# Patient Record
Sex: FEMALE | Race: WHITE | Employment: OTHER | URBAN - METROPOLITAN AREA
[De-identification: names, ages, dates, MRNs, and addresses within clinical notes are randomized per-mention and may not be internally consistent; named-entity substitution may affect disease eponyms.]

---

## 2017-02-06 ENCOUNTER — OFFICE VISIT (OUTPATIENT)
Dept: SURGERY | Age: 62
End: 2017-02-06

## 2017-02-06 VITALS
HEART RATE: 85 BPM | HEIGHT: 61 IN | DIASTOLIC BLOOD PRESSURE: 67 MMHG | WEIGHT: 204 LBS | SYSTOLIC BLOOD PRESSURE: 106 MMHG | BODY MASS INDEX: 38.51 KG/M2

## 2017-02-06 DIAGNOSIS — Z90.11 S/P RIGHT MASTECTOMY: ICD-10-CM

## 2017-02-06 DIAGNOSIS — C50.411 CARCINOMA OF UPPER-OUTER QUADRANT OF FEMALE BREAST, RIGHT (HCC): Primary | ICD-10-CM

## 2017-02-06 RX ORDER — VALSARTAN AND HYDROCHLOROTHIAZIDE 80; 12.5 MG/1; MG/1
TABLET, FILM COATED ORAL
Refills: 12 | COMMUNITY
Start: 2017-01-30 | End: 2018-01-12 | Stop reason: SDUPTHER

## 2017-02-06 RX ORDER — SERTRALINE HYDROCHLORIDE 50 MG/1
TABLET, FILM COATED ORAL
Refills: 3 | COMMUNITY
Start: 2017-01-30 | End: 2017-08-30 | Stop reason: SDUPTHER

## 2017-02-06 RX ORDER — METFORMIN HYDROCHLORIDE 500 MG/1
TABLET ORAL
Refills: 3 | COMMUNITY
Start: 2017-01-30 | End: 2017-08-30 | Stop reason: SDUPTHER

## 2017-02-06 NOTE — PROGRESS NOTES
HISTORY OF PRESENT ILLNESS  Nahed Rodriguez is a 57 y.o. female. HPI  ESTABLISHED patient here today for follow up RIGHT breast cancer. Denies any breast problems at this time. She wears her sleeve for right arm lymphedema. She is currently taking Letrozole and tolerating well with hot flashes and mood swings. She was started on Zoloft to help with this, which has helped.      History of breast cancer-  RIGHT breast IDC - ER/[place] positive, Her2 negative. Low risk mammaprint. T2N1mi  12/3/15- RIGHT breast mastectomy, SLNB and no reconstruction  1/19/16- RIGHT breast debridement of mastectomy site  4/2016- completed radiation. Followed by Dr. Almas Arthur.    Started Letrozole. Followed by Dr. Murtaza Matamoros. OB History     Obstetric Comments    Menarche:  14. LMP: 38.  # of Children:  2. Age at Delivery of First Child:  20.   Hysterectomy/oophorectomy:  NO/NO. Breast Bx:  no.  Hx of Breast Feeding:  no. BCP:  yes. Hormone therapy:  no.               Past Surgical History   Procedure Laterality Date    Hx heent  1968     tonsillectomy    Hx appendectomy  1970    Hx mastectomy Right 12/3/2015     RIGHT BREAST SIMPLE MASTECTOMY WITH RIGHT SENTINEL NODE BIOPSY performed by Louisa Becker MD at [facility] AMBULATORY OR    Hx breast biopsy Right 1/19/2016     RIGHT BREAST DEBRIDEMENT OF MASTECTOMY SITE performed by Louisa Becker MD at [address] includes-  Paternal aunt diagnosed with breast cancer in late 60's-early 70's. Recent imaging-  Left screening mammogram on 10/25/16- BIRADS 2  FINDINGS: Unilateral digital diagnostic left mammography was performed, and is  interpreted in conjunction with a computer assisted detection (CAD) system. The  breast parenchymal pattern is scattered fibroglandular densities. No new masses  or suspicious calcifications are identified. There is no skin thickening  or nipple retraction. IMPRESSION:   No mammographic evidence of malignancy.  Next screening mammogram is  recommended in one year. The patient will be notified of these results. BIRADS 2: Benign. ROS    Physical Exam   Constitutional: She is oriented to person, place, and time. She appears well-developed and well-nourished. Pulmonary/Chest: Right breast exhibits no mass, no skin change and no tenderness. Left breast exhibits no inverted nipple, no mass, no nipple discharge, no skin change and no tenderness. Musculoskeletal: Normal range of motion. UE x 2  Wearing right arm compression sleeve   Lymphadenopathy:     She has no cervical adenopathy. She has no axillary adenopathy. Right: No supraclavicular adenopathy present. Left: No supraclavicular adenopathy present. Neurological: She is alert and oriented to person, place, and time. Skin: Skin is warm, dry and intact. Chest and breasts examined   Psychiatric: She has a normal mood and affect. Her speech is normal and behavior is normal.     Visit Vitals    /67    Pulse 85    Ht 5' 1\" (1.549 m)    Wt 204 lb (92.5 kg)    BMI 38.55 kg/m2     ASSESSMENT and PLAN  Encounter Diagnoses   Name Primary?  Carcinoma of upper-outer quadrant of female breast, right ([address] Yes    S/P right mastectomy      Normal exam and imaging with no evidence of local disease. We discussed her diagnosis, treatment and ongoing management of her breast health with SBE, CBE and imaging. She is tolerating the letrozole well with the addition of Zoloft. She was given a prescription for a right breast prosthesis and bras. She will plan to RTC here in 6 months and then I anticipate yearly visits. She will also continue to see Dr. Fang Gomes. She will plan to have a LSmammogram in 10/2017. She is comfortable with this plan. All questions answered and she stated understanding.

## 2017-02-06 NOTE — PROGRESS NOTES
HISTORY OF PRESENT ILLNESS  Denis Hagan is a 57 y.o. female. HPI   ESTABLISHED patient here today for follow up RIGHT breast cancer. Denies any breast problems at this time. She wears her sleeve for right arm lymphedema. She is currently taking Letrozole and tolerating well with hot flashes and mood swings. She was started on Zoloft to help with this, which has helped.      History of breast cancer-  RIGHT breast IDC - ER/[place] positive, Her2 negative. Low risk mammaprint. T2N1mi  12/3/15- RIGHT breast mastectomy, SLNB and no reconstruction  1/19/16- RIGHT breast debridement of mastectomy site  4/2016- completed radiation. Followed by Dr. Sindy Mckeon.    Started Letrozole. Followed by Dr. Kalie Berkowitz. FH includes-  Paternal aunt diagnosed with breast cancer in late 60's-early 66's. Recent imaging-  Left screening mammogram on 10/25/16- BIRADS 2  FINDINGS: Unilateral digital diagnostic left mammography was performed, and is  interpreted in conjunction with a computer assisted detection (CAD) system. The  breast parenchymal pattern is scattered fibroglandular densities. No new masses  or suspicious calcifications are identified. There is no skin thickening  or nipple retraction. IMPRESSION:   No mammographic evidence of malignancy. Next screening mammogram is  recommended in one year. The patient will be notified of these results.      BIRADS 2: Benign.      ROS    Physical Exam    ASSESSMENT and PLAN  {ASSESSMENT/PLAN:43084}

## 2017-02-06 NOTE — PATIENT INSTRUCTIONS
Breast Self-Exam: Care Instructions  Your Care Instructions  A breast self-exam is when you check your breasts for lumps or changes. This regular exam helps you learn how your breasts normally look and feel. Most breast problems or changes are not because of cancer. Breast self-exam is not a substitute for a mammogram. Having regular breast exams by your doctor and regular mammograms improve your chances of finding any problems with your breasts. Some women set a time each month to do a step-by-step breast self-exam. Other women like a less formal system. They might look at their breasts as they brush their teeth, or feel their breasts once in a while in the shower. If you notice a change in your breast, tell your doctor. Follow-up care is a key part of your treatment and safety. Be sure to make and go to all appointments, and call your doctor if you are having problems. Its also a good idea to know your test results and keep a list of the medicines you take. How do you do a breast self-exam?  · The best time to examine your breasts is usually one week after your menstrual period begins. Your breasts should not be tender then. If you do not have periods, you might do your exam on a day of the month that is easy to remember. · To examine your breasts:  ¨ Remove all your clothes above the waist and lie down. When you are lying down, your breast tissue spreads evenly over your chest wall, which makes it easier to feel all your breast tissue. ¨ Use the pads--not the fingertips--of the 3 middle fingers of your left hand to check your right breast. Move your fingers slowly in small coin-sized circles that overlap. ¨ Use three levels of pressure to feel of all your breast tissue. Use light pressure to feel the tissue close to the skin surface. Use medium pressure to feel a little deeper. Use firm pressure to feel your tissue close to your breastbone and ribs.  Use each pressure level to feel your breast tissue before moving on to the next spot. ¨ Check your entire breast, moving up and down as if following a strip from the collarbone to the bra line, and from the armpit to the ribs. Repeat until you have covered the entire breast.  ¨ Repeat this procedure for your left breast, using the pads of the 3 middle fingers of your right hand. · To examine your breasts while in the shower:  ¨ Place one arm over your head and lightly soap your breast on that side. ¨ Using the pads of your fingers, gently move your hand over your breast (in the strip pattern described above), feeling carefully for any lumps or changes. ¨ Repeat for the other breast.  · Have your doctor inspect anything you notice to see if you need further testing. Where can you learn more? Go to http://josé luis-nicole.info/. Enter P148 in the search box to learn more about \"Breast Self-Exam: Care Instructions. \"  Current as of: July 26, 2016  Content Version: 11.1  © 4149-6319 [employer]. Care instructions adapted under license by [employer] (which disclaims liability or warranty for this information). If you have questions about a medical condition or this instruction, always ask your healthcare professional. [place] [address] any warranty or liability for your use of this information.

## 2017-08-07 ENCOUNTER — OFFICE VISIT (OUTPATIENT)
Dept: SURGERY | Age: 62
End: 2017-08-07

## 2017-08-07 VITALS
WEIGHT: 210 LBS | HEART RATE: 88 BPM | BODY MASS INDEX: 39.65 KG/M2 | HEIGHT: 61 IN | SYSTOLIC BLOOD PRESSURE: 125 MMHG | DIASTOLIC BLOOD PRESSURE: 69 MMHG

## 2017-08-07 DIAGNOSIS — Z90.11 S/P RIGHT MASTECTOMY: ICD-10-CM

## 2017-08-07 DIAGNOSIS — C50.411 CARCINOMA OF UPPER-OUTER QUADRANT OF FEMALE BREAST, RIGHT (HCC): Primary | ICD-10-CM

## 2017-08-07 RX ORDER — ERGOCALCIFEROL 1.25 MG/1
CAPSULE ORAL
Refills: 0 | COMMUNITY
Start: 2017-07-20 | End: 2017-10-27 | Stop reason: ALTCHOICE

## 2017-08-07 NOTE — PATIENT INSTRUCTIONS
Breast Self-Exam: Care Instructions  Your Care Instructions  A breast self-exam is when you check your breasts for lumps or changes. This regular exam helps you learn how your breasts normally look and feel. Most breast problems or changes are not because of cancer. Breast self-exam is not a substitute for a mammogram. Having regular breast exams by your doctor and regular mammograms improve your chances of finding any problems with your breasts. Some women set a time each month to do a step-by-step breast self-exam. Other women like a less formal system. They might look at their breasts as they brush their teeth, or feel their breasts once in a while in the shower. If you notice a change in your breast, tell your doctor. Follow-up care is a key part of your treatment and safety. Be sure to make and go to all appointments, and call your doctor if you are having problems. Its also a good idea to know your test results and keep a list of the medicines you take. How do you do a breast self-exam?  · The best time to examine your breasts is usually one week after your menstrual period begins. Your breasts should not be tender then. If you do not have periods, you might do your exam on a day of the month that is easy to remember. · To examine your breasts:  ¨ Remove all your clothes above the waist and lie down. When you are lying down, your breast tissue spreads evenly over your chest wall, which makes it easier to feel all your breast tissue. ¨ Use the pads--not the fingertips--of the 3 middle fingers of your left hand to check your right breast. Move your fingers slowly in small coin-sized circles that overlap. ¨ Use three levels of pressure to feel of all your breast tissue. Use light pressure to feel the tissue close to the skin surface. Use medium pressure to feel a little deeper. Use firm pressure to feel your tissue close to your breastbone and ribs.  Use each pressure level to feel your breast tissue before moving on to the next spot. ¨ Check your entire breast, moving up and down as if following a strip from the collarbone to the bra line, and from the armpit to the ribs. Repeat until you have covered the entire breast.  ¨ Repeat this procedure for your left breast, using the pads of the 3 middle fingers of your right hand. · To examine your breasts while in the shower:  ¨ Place one arm over your head and lightly soap your breast on that side. ¨ Using the pads of your fingers, gently move your hand over your breast (in the strip pattern described above), feeling carefully for any lumps or changes. ¨ Repeat for the other breast.  · Have your doctor inspect anything you notice to see if you need further testing. Where can you learn more? Go to http://josé luis-nicole.info/. Enter P148 in the search box to learn more about \"Breast Self-Exam: Care Instructions. \"  Current as of: July 26, 2016  Content Version: 11.3  © 6201-3569 [employer]. Care instructions adapted under license by [employer] (which disclaims liability or warranty for this information). If you have questions about a medical condition or this instruction, always ask your healthcare professional. [place] [address] any warranty or liability for your use of this information.

## 2017-08-07 NOTE — PROGRESS NOTES
HISTORY OF PRESENT ILLNESS  Christy Green is a 57 y.o. female.   HPI       ROS    Physical Exam    ASSESSMENT and PLAN  {ASSESSMENT/PLAN:12753}

## 2017-08-07 NOTE — PROGRESS NOTES
HISTORY OF PRESENT ILLNESS  Mira Hassan is a 57 y.o. female. HPI  ESTABLISHED patient here today for follow up RIGHT breast cancer. Denies any breast problems at this time. Wears right arm lymphedema sleeve at night. Currently taking Letrozole and tolerating with hot flashes. She takes zoloft and every dose of Vitamin D for this symptom. History of breast cancer-  RIGHT breast IDC - ER/[place] positive, Her2 negative. Low risk mammaprint. T2N1mi  12/3/15- RIGHT breast mastectomy, SLNB and no reconstruction  1/19/16- RIGHT breast debridement of mastectomy site  4/2016- completed radiation. Followed by Dr. Chun Nicole.    Started Letrozole. Followed by Dr. Antonio Ware. OB History     Obstetric Comments    Menarche:  14. LMP: 38.  # of Children:  2. Age at Delivery of First Child:  20.   Hysterectomy/oophorectomy:  NO/NO. Breast Bx:  no.  Hx of Breast Feeding:  no. BCP:  yes. Hormone therapy:  no.               Past Surgical History:   Procedure Laterality Date    HX APPENDECTOMY  1970    HX BREAST BIOPSY Right 1/19/2016    RIGHT BREAST DEBRIDEMENT OF MASTECTOMY SITE performed by Vito Canchola MD at [address]  [facility]    tonsillectomy    HX MASTECTOMY Right 12/3/2015    RIGHT BREAST SIMPLE MASTECTOMY WITH RIGHT SENTINEL NODE BIOPSY performed by Vito Canchola MD at [facility] AMBULATORY OR        includes-  Paternal aunt diagnosed with breast cancer in late 60's-early 70's. Recent imaging-  LEFT screening mammogram on 10/25/16- BIRADS 2  ROS    Physical Exam   Constitutional: She appears well-developed and well-nourished. Pulmonary/Chest: Left breast exhibits no inverted nipple, no mass, no nipple discharge, no skin change and no tenderness. Musculoskeletal: Normal range of motion. UE x 2  No right arm lymphedema noted   Lymphadenopathy:     She has no cervical adenopathy. She has no axillary adenopathy. Right: No supraclavicular adenopathy present.         Left: No supraclavicular adenopathy present. Skin: Skin is warm, dry and intact. Chest and breasts examined   Psychiatric: She has a normal mood and affect. Her speech is normal and behavior is normal.     Visit Vitals    /69    Pulse 88    Ht 5' 1\" (1.549 m)    Wt 210 lb (95.3 kg)    BMI 39.68 kg/m2     ASSESSMENT and PLAN  Encounter Diagnoses   Name Primary?  Carcinoma of upper-outer quadrant of female breast, right ([address] Yes    S/P right mastectomy      Normal exam and imaging with no evidence of local recurrence. She is not having any issues with right arm lymphedema and has a sleeve that she wears. She has bras and a prosthesis. She will plan to RTC here in 1 year in the interim she will see Dr. Claudette Sanchez, Dr. Neil Perla and her GYN. She is comfortable with this plan. All questions answered and she stated understanding.

## 2017-08-10 PROBLEM — L02.213 ABSCESS OF CHEST WALL: Status: ACTIVE | Noted: 2017-08-10

## 2017-08-10 PROBLEM — J45.909 ACUTE ASTHMATIC BRONCHITIS: Status: ACTIVE | Noted: 2017-08-10

## 2017-08-10 PROBLEM — R45.86 MOOD SWINGS: Status: ACTIVE | Noted: 2017-08-10

## 2017-08-10 PROBLEM — E66.9 OBESITY: Status: ACTIVE | Noted: 2017-08-10

## 2017-08-10 PROBLEM — H61.21 IMPACTED CERUMEN OF RIGHT EAR: Status: ACTIVE | Noted: 2017-08-10

## 2017-08-10 PROBLEM — L50.9 URTICARIA: Status: ACTIVE | Noted: 2017-08-10

## 2017-08-10 PROBLEM — R73.9 HYPERGLYCEMIA: Status: ACTIVE | Noted: 2017-08-10

## 2017-08-10 RX ORDER — CIPROFLOXACIN 250 MG/1
TABLET, FILM COATED ORAL EVERY 12 HOURS
COMMUNITY
End: 2017-08-22 | Stop reason: ALTCHOICE

## 2017-08-13 PROBLEM — I10 HYPERTENSION: Status: ACTIVE | Noted: 2017-08-13

## 2017-08-13 PROBLEM — E11.9 DIABETES (HCC): Status: ACTIVE | Noted: 2017-08-13

## 2017-08-22 ENCOUNTER — OFFICE VISIT (OUTPATIENT)
Dept: INTERNAL MEDICINE CLINIC | Age: 62
End: 2017-08-22

## 2017-08-22 VITALS
HEIGHT: 61 IN | BODY MASS INDEX: 39.84 KG/M2 | DIASTOLIC BLOOD PRESSURE: 72 MMHG | HEART RATE: 78 BPM | WEIGHT: 211 LBS | SYSTOLIC BLOOD PRESSURE: 122 MMHG

## 2017-08-22 DIAGNOSIS — E11.9 TYPE 2 DIABETES MELLITUS WITHOUT COMPLICATION, WITHOUT LONG-TERM CURRENT USE OF INSULIN (HCC): Primary | ICD-10-CM

## 2017-08-22 DIAGNOSIS — F17.211 CIGARETTE NICOTINE DEPENDENCE IN REMISSION: ICD-10-CM

## 2017-08-22 DIAGNOSIS — K21.9 GERD WITHOUT ESOPHAGITIS: ICD-10-CM

## 2017-08-22 DIAGNOSIS — I10 ESSENTIAL HYPERTENSION: ICD-10-CM

## 2017-08-22 DIAGNOSIS — E03.9 ACQUIRED HYPOTHYROIDISM: ICD-10-CM

## 2017-08-22 DIAGNOSIS — E66.9 OBESITY, UNSPECIFIED OBESITY SEVERITY, UNSPECIFIED OBESITY TYPE: ICD-10-CM

## 2017-08-22 NOTE — PROGRESS NOTES
This note will not be viewable in [address] Humberto Washburn is a 60 y.o. female and presents with Hypertension (6 mo fu)  . Subjective:    Mrs. Ashley Healy presents to the office today in follow-up of multiple medical problems. The patient has type 2 diabetes mellitus and remains on Glucophage. Patient does not check her blood sugars. She has not had a diabetic retinal eye exam done within the last year. She denies polyuria polydipsia or blurred vision. She denies unexplained weight loss. The patient's is on Diovan HCT for her hypertension. Denies dizziness muscle cramping or lower extremity edema. She has had no headaches, numbness, tingling or focal neurological problems. She remains on PPI therapy for acid reflux. She denies dysphasia, early satiety or unexplained weight loss. She has had no free acid reflux and denies cough or hoarseness. She is obese not currently on any diet and has no recorded weight loss since last being seen. The patient is now one year after having quit cigarette smoking. She has not been using vapor as well.      Past Medical History:   Diagnosis Date    Abscess of chest wall 8/10/2017    Acute asthmatic bronchitis 8/10/2017    Breast cancer ([facility] [address] 2015    Rt mastectomy    Cancer ([facility] [address]     right breast    Cigarette nicotine dependence in remission 8/22/2017    Diabetes ([facility] [address]     Dyspepsia and other specified disorders of function of stomach     GERD (gastroesophageal reflux disease)     GERD without esophagitis 8/22/2017    Hyperglycemia 8/10/2017    Hypertension     Impacted cerumen of right ear 8/10/2017    Mood swings (HCC) 8/10/2017    Nausea & vomiting     Obesity 8/10/2017    Urticaria 8/10/2017     Past Surgical History:   Procedure Laterality Date    HX APPENDECTOMY  1970    HX BREAST BIOPSY Right 1/19/2016    RIGHT BREAST DEBRIDEMENT OF MASTECTOMY SITE performed by Antelmo Brady MD at [address]    tonsillectomy    HX MASTECTOMY Right 12/3/2015    RIGHT BREAST SIMPLE MASTECTOMY WITH RIGHT SENTINEL NODE BIOPSY performed by Antelmo Brady MD at [facility] AMBULATORY OR     Allergies   Allergen Reactions    Latex Rash    Antihistamine [Diphenhydramine Hcl] Unknown (comments)    Iodine Rash    Novocain [Procaine] Palpitations     Current Outpatient Prescriptions   Medication Sig Dispense Refill    ergocalciferol (ERGOCALCIFEROL) 50,000 unit capsule TAKE 1 EACH WEEK FOR 12 WEEKS, THEN STOP  0    metFORMIN (GLUCOPHAGE) 500 mg tablet TAKE 1 TABLET BY MOUTH TWICE A DAY  3    sertraline (ZOLOFT) 50 mg tablet TAKE 1 TABLET BY MOUTH EVERY DAY  3    valsartan-hydroCHLOROthiazide (DIOVAN-HCT) 80-12.5 mg per tablet TAKE 1 TABLET EVERY DAY  12    letrozole (FEMARA) 2.5 mg tablet   11    Omeprazole delayed release (PRILOSEC D/R) 20 mg tablet Take 20 mg by mouth daily.        Social History     Social History    Marital status:      Spouse name: N/A    Number of children: N/A    Years of education: N/A     Social History Main Topics    Smoking status: Former Smoker     Packs/day: 1.00     Years: 30.00     Types: Cigarettes     Quit date: 12/3/2015    Smokeless tobacco: Former User     Quit date: 8/7/2016      Comment: vaped for one year after quitting smoking     Alcohol use No    Drug use: No    Sexual activity: Not Asked     Other Topics Concern    None     Social History Narrative     Family History   Problem Relation Age of Onset    Cancer Father      lung    Osteoporosis Mother     Arthritis-osteo Sister     Cancer Paternal Aunt      breast cancer, late 60's-early 66's       Health Maintenance   Topic Date Due    Hepatitis C Screening  1955    HEMOGLOBIN A1C Q6M  1955    LIPID PANEL Q1  1955    FOOT EXAM Q1  08/17/1964    MICROALBUMIN Q1  08/17/1964    EYE EXAM RETINAL OR DILATED Q1  08/17/1964    Pneumococcal 19-64 Highest Risk (1 of 3 - PCV13) 08/17/1973    DTaP/Tdap/Td series (1 - Tdap) 08/17/1975    PAP AKA CERVICAL CYTOLOGY  08/17/1975    FOBT Q 1 YEAR AGE 50-75  08/17/2004    ZOSTER VACCINE AGE 60>  06/17/2014    INFLUENZA AGE 9 TO ADULT  08/01/2017    BREAST CANCER SCRN MAMMOGRAM  10/25/2018        Review of Systems  Constitutional: negative for fevers, chills, anorexia and weight loss  Eyes:   negative for visual disturbance and irritation  ENT:   negative for tinnitus,sore throat,nasal congestion,ear pains. hoarseness  Respiratory:  negative for cough, hemoptysis, dyspnea,wheezing  CV:   negative for chest pain, palpitations, lower extremity edema  GI:   negative for nausea, vomiting, diarrhea, abdominal pain,melena  Endo:               negative for polyuria,polydipsia,polyphagia,heat intolerance  Genitourinary: negative for frequency, dysuria and hematuria  Integumentary: negative for rash and pruritus  Hematologic:  negative for easy bruising and gum/nose bleeding  Musculoskel: negative for myalgias, arthralgias, back pain, muscle weakness, joint pain  Neurological:  negative for headaches, dizziness, vertigo, memory problems and gait   Behavl/Psych: negative for feelings of anxiety, depression, mood changes  ROS otherwise negative      Objective:  Visit Vitals    /72 (BP 1 Location: Left arm, BP Patient Position: Sitting)    Pulse 78    Ht 5' 1\" (1.549 m)    Wt 211 lb (95.7 kg)    BMI 39.87 kg/m2     Body mass index is 39.87 kg/(m^2).     Physical Exam:   General appearance - alert, well appearing, and in no distress  Mental status - alert, oriented to person, place, and time  EYE-BERLIN, EOMI, fundi normal, corneas normal, no foreign bodies  ENT-ENT exam normal, no neck nodes or sinus tenderness  Nose - normal and patent, no erythema, discharge or polyps  Mouth - mucous membranes moist, pharynx normal without lesions  Neck - supple, no significant adenopathy   Chest - clear to auscultation, no wheezes, rales or rhonchi, symmetric air entry   Heart - normal rate, regular rhythm, normal S1, S2, no murmurs, rubs, clicks or gallops   Abdomen - soft, nontender, nondistended, no masses or organomegaly  Lymph- no adenopathy palpable  Ext-peripheral pulses normal, no pedal edema, no clubbing or cyanosis  Skin-Warm and dry. no hyperpigmentation, vitiligo, or suspicious lesions  Neuro -alert, oriented, normal speech, no focal findings or movement disorder noted      Assessment/Plan:  Diagnoses and all orders for this visit:    Type 2 diabetes mellitus without complication, without long-term current use of insulin (HCC)  -     HEMOGLOBIN A1C WITH EAG  -     TSH 3RD GENERATION  -     MICROALBUMIN, UR, RAND    Essential hypertension  -     CBC W/O DIFF  -     METABOLIC PANEL, COMPREHENSIVE  -     URINALYSIS W/ RFLX MICROSCOPIC  -     LIPID PANEL    Obesity, unspecified obesity severity, unspecified obesity type    GERD without esophagitis    Cigarette nicotine dependence in remission        Other instructions:      Her medications are reviewed and reconciled and no change to her current medical regimen is been made. No added salt, controlled carb diet and weight loss are encouraged. Consider start of once daily blood sugar checks. Due for diabetic retinal eye examination. Await results of multiple labs. She is congratulated on her 1 year of non-nicotine usage. Follow-up Disposition:  Return in about 6 months (around 2/22/2018). I have reviewed with the patient details of the assessment and plan and all questions were answered. Relevent patient education was performed. The most recent lab findings were reviewed with the patient. An After Visit Summary was printed and given to the patient.     Allyssa Lainez MD

## 2017-08-22 NOTE — PATIENT INSTRUCTIONS

## 2017-08-22 NOTE — MR AVS SNAPSHOT
Visit Information Date & Time Provider Department Dept. Phone Encounter #  
 8/22/2017  1:30 PM Libby Lozano MD [facility] 84 [phone] [id number] Follow-up Instructions Return in about 6 months (around 2/22/2018). Follow-up and Disposition History Your Appointments 8/9/2018  1:30 PM  
Follow Up with Marah Lagunas NP [address] ([address]) Appt Note: 1 year follow up / cp $35 ? ct 8-7-17  
 [address] [address]  
[address] Upcoming Health Maintenance Date Due Hepatitis C Screening 1955 HEMOGLOBIN A1C Q6M 1955 LIPID PANEL Q1 1955 FOOT EXAM Q1 8/17/1964 MICROALBUMIN Q1 8/17/1964 EYE EXAM RETINAL OR DILATED Q1 8/17/1964 Pneumococcal 19-64 Highest Risk (1 of 3 - PCV13) 8/17/1973 DTaP/Tdap/Td series (1 - Tdap) 8/17/1975 PAP AKA CERVICAL CYTOLOGY 8/17/1975 FOBT Q 1 YEAR AGE 50-75 8/17/2004 ZOSTER VACCINE AGE 60> 6/17/2014 INFLUENZA AGE 9 TO ADULT 8/1/2017 BREAST CANCER SCRN MAMMOGRAM 10/25/2018 Allergies as of 8/22/2017  Review Complete On: 8/22/2017 By: Libby Lozano MD  
  
 Severity Noted Reaction Type Reactions Latex  05/23/2012    Rash Antihistamine [Diphenhydramine Hcl]  08/10/2017    Unknown (comments) Iodine  05/23/2012    Rash Novocain [Procaine]  05/23/2012    Palpitations Current Immunizations  Never Reviewed Name Date Influenza Vaccine (Quad) PF 12/4/2015 10:36 AM  
  
 Not reviewed this visit You Were Diagnosed With   
  
 Codes Comments Type 2 diabetes mellitus without complication, without long-term current use of insulin (HCC)    -  Primary ICD-10-CM: E11.9 ICD-9-CM: 250.00 Essential hypertension     ICD-10-CM: I10 
ICD-9-CM: 401.9 Obesity, unspecified obesity severity, unspecified obesity type     ICD-10-CM: E66.9 ICD-9-CM: 278.00 GERD without esophagitis     ICD-10-CM: K21.9 ICD-9-CM: 530.81 Cigarette nicotine dependence in remission     ICD-10-CM: F17.211 ICD-9-CM: V15.82 Vitals BP Pulse Height(growth percentile) Weight(growth percentile) BMI OB Status 122/72 (BP 1 Location: Left arm, BP Patient Position: Sitting) 78 5' 1\" (1.549 m) 211 lb (95.7 kg) 39.87 kg/m2 Postmenopausal  
 Smoking Status Former Smoker BMI and BSA Data Body Mass Index Body Surface Area  
 39.87 kg/m 2 2.03 m 2 Your Updated Medication List  
  
   
This list is accurate as of: 8/22/17  1:52 PM.  Always use your most recent med list.  
  
  
  
  
 ergocalciferol 50,000 unit capsule Commonly known as:  ERGOCALCIFEROL  
TAKE 1 EACH WEEK FOR 12 WEEKS, THEN STOP  
  
 letrozole 2.5 mg tablet Commonly known as:  FEMARA  
  
 metFORMIN 500 mg tablet Commonly known as:  GLUCOPHAGE  
TAKE 1 TABLET BY MOUTH TWICE A DAY Omeprazole delayed release 20 mg tablet Commonly known as:  PRILOSEC D/R Take 20 mg by mouth daily. sertraline 50 mg tablet Commonly known as:  ZOLOFT  
TAKE 1 TABLET BY MOUTH EVERY DAY  
  
 valsartan-hydroCHLOROthiazide 80-12.5 mg per tablet Commonly known as:  DIOVAN-HCT  
TAKE 1 TABLET EVERY DAY We Performed the Following CBC W/O DIFF [92329 CPT(R)] HEMOGLOBIN A1C WITH EAG [63075 CPT(R)] LIPID PANEL [28384 CPT(R)] METABOLIC PANEL, COMPREHENSIVE [74398 CPT(R)] MICROALBUMIN, UR, RAND [id number] CPT(R)] TSH 3RD GENERATION [00126 CPT(R)] URINALYSIS W/ RFLX MICROSCOPIC [68937 CPT(R)] Follow-up Instructions Return in about 6 months (around 2/22/2018). Patient Instructions Learning About Diabetes Food Guidelines Your Care Instructions Meal planning is important to manage diabetes.  It helps keep your blood sugar at a target level (which you set with your doctor). You don't have to eat special foods. You can eat what your family eats, including sweets once in a while. But you do have to pay attention to how often you eat and how much you eat of certain foods. You may want to work with a dietitian or a certified diabetes educator (CDE) to help you plan meals and snacks. A dietitian or CDE can also help you lose weight if that is one of your goals. What should you know about eating carbs? Managing the amount of carbohydrate (carbs) you eat is an important part of healthy meals when you have diabetes. Carbohydrate is found in many foods. · Learn which foods have carbs. And learn the amounts of carbs in different foods. ¨ Bread, cereal, pasta, and rice have about 15 grams of carbs in a serving. A serving is 1 slice of bread (1 ounce), ½ cup of cooked cereal, or 1/3 cup of cooked pasta or rice. ¨ Fruits have 15 grams of carbs in a serving. A serving is 1 small fresh fruit, such as an apple or orange; ½ of a banana; ½ cup of cooked or canned fruit; ½ cup of fruit juice; 1 cup of melon or raspberries; or 2 tablespoons of dried fruit. ¨ Milk and no-sugar-added yogurt have 15 grams of carbs in a serving. A serving is 1 cup of milk or 2/3 cup of no-sugar-added yogurt. ¨ Starchy vegetables have 15 grams of carbs in a serving. A serving is ½ cup of mashed potatoes or sweet potato; 1 cup winter squash; ½ of a small baked potato; ½ cup of cooked beans; or ½ cup cooked corn or green peas. · Learn how much carbs to eat each day and at each meal. A dietitian or CDE can teach you how to keep track of the amount of carbs you eat. This is called carbohydrate counting. · If you are not sure how to count carbohydrate grams, use the Plate Method to plan meals. It is a good, quick way to make sure that you have a balanced meal. It also helps you spread carbs throughout the day. ¨ Divide your plate by types of foods. Put non-starchy vegetables on half the plate, meat or other protein food on one-quarter of the plate, and a grain or starchy vegetable in the final quarter of the plate. To this you can add a small piece of fruit and 1 cup of milk or yogurt, depending on how many carbs you are supposed to eat at a meal. 
· Try to eat about the same amount of carbs at each meal. Do not \"save up\" your daily allowance of carbs to eat at one meal. 
· Proteins have very little or no carbs per serving. Examples of proteins are beef, chicken, turkey, fish, eggs, tofu, cheese, cottage cheese, and peanut butter. A serving size of meat is 3 ounces, which is about the size of a deck of cards. Examples of meat substitute serving sizes (equal to 1 ounce of meat) are 1/4 cup of cottage cheese, 1 egg, 1 tablespoon of peanut butter, and ½ cup of tofu. How can you eat out and still eat healthy? · Learn to estimate the serving sizes of foods that have carbohydrate. If you measure food at home, it will be easier to estimate the amount in a serving of restaurant food. · If the meal you order has too much carbohydrate (such as potatoes, corn, or baked beans), ask to have a low-carbohydrate food instead. Ask for a salad or green vegetables. · If you use insulin, check your blood sugar before and after eating out to help you plan how much to eat in the future. · If you eat more carbohydrate at a meal than you had planned, take a walk or do other exercise. This will help lower your blood sugar. What else should you know? · Limit saturated fat, such as the fat from meat and dairy products. This is a healthy choice because people who have diabetes are at higher risk of heart disease. So choose lean cuts of meat and nonfat or low-fat dairy products. Use olive or canola oil instead of butter or shortening when cooking. · Don't skip meals.  Your blood sugar may drop too low if you skip meals and take insulin or certain medicines for diabetes. · Check with your doctor before you drink alcohol. Alcohol can cause your blood sugar to drop too low. Alcohol can also cause a bad reaction if you take certain diabetes medicines. Follow-up care is a key part of your treatment and safety. Be sure to make and go to all appointments, and call your doctor if you are having problems. It's also a good idea to know your test results and keep a list of the medicines you take. Where can you learn more? Go to http://josé luis-nicole.info/. Enter B528 in the search box to learn more about \"Learning About Diabetes Food Guidelines. \" Current as of: March 13, 2017 Content Version: 11.3 © 8943-6726 [employer]. Care instructions adapted under license by [employer] (which disclaims liability or warranty for this information). If you have questions about a medical condition or this instruction, always ask your healthcare professional. [place] [address] any warranty or liability for your use of this information. Patient Instructions History Introducing \[facility]\"" & HEALTH SERVICES! Dear Valentín Rabago: Thank you for requesting a [employer] account. Our records indicate that you have previously registered for a [employer] account but its currently inactive. Please call our [employer] support line at [phone]. Additional Information If you have questions, please visit the Frequently Asked Questions section of the [employer] website at [email or web address] Remember, [employer] is NOT to be used for urgent needs. For medical emergencies, dial 911. Now available from your iPhone and Android! Please provide this summary of care documentation to your next provider. Your primary care clinician is listed as OSCAR Gibbons. If you have any questions after today's visit, please call [phone].

## 2017-08-22 NOTE — PROGRESS NOTES
Hannah Phelps is a 60 y.o. female presenting for Hypertension (6 mo fu)  . 1. Have you been to the ER, urgent care clinic since your last visit? Hospitalized since your last visit? No    2. Have you seen or consulted any other health care providers outside of the [address] [place] [address] since your last visit? Include any pap smears or colon screening. No    No flowsheet data found. Abuse Screening Questionnaire 8/22/2017   Do you ever feel afraid of your partner? N   Are you in a relationship with someone who physically or mentally threatens you? N   Is it safe for you to go home? Y       No flowsheet data found.     Medications Discontinued During This Encounter   Medication Reason    ciprofloxacin HCl (CIPRO) 250 mg tablet Therapy Completed

## 2017-08-23 LAB
ALBUMIN SERPL-MCNC: 4.5 G/DL (ref 3.6–4.8)
ALBUMIN/GLOB SERPL: 1.4 {RATIO} (ref 1.2–2.2)
ALP SERPL-CCNC: 80 IU/L (ref 39–117)
ALT SERPL-CCNC: 18 IU/L (ref 0–32)
APPEARANCE UR: CLEAR
AST SERPL-CCNC: 18 IU/L (ref 0–40)
BILIRUB SERPL-MCNC: 0.3 MG/DL (ref 0–1.2)
BILIRUB UR QL STRIP: NEGATIVE
BUN SERPL-MCNC: 16 MG/DL (ref 8–27)
BUN/CREAT SERPL: 23 (ref 12–28)
CALCIUM SERPL-MCNC: 10.3 MG/DL (ref 8.7–10.3)
CHLORIDE SERPL-SCNC: 98 MMOL/L (ref 96–106)
CHOLEST SERPL-MCNC: 131 MG/DL (ref 100–199)
CO2 SERPL-SCNC: 22 MMOL/L (ref 18–29)
COLOR UR: YELLOW
CREAT SERPL-MCNC: 0.69 MG/DL (ref 0.57–1)
ERYTHROCYTE [DISTWIDTH] IN BLOOD BY AUTOMATED COUNT: 13.2 % (ref 12.3–15.4)
EST. AVERAGE GLUCOSE BLD GHB EST-MCNC: 131 MG/DL
GLOBULIN SER CALC-MCNC: 3.2 G/DL (ref 1.5–4.5)
GLUCOSE SERPL-MCNC: 98 MG/DL (ref 65–99)
GLUCOSE UR QL: NEGATIVE
HBA1C MFR BLD: 6.2 % (ref 4.8–5.6)
HCT VFR BLD AUTO: 41.4 % (ref 34–46.6)
HDLC SERPL-MCNC: 46 MG/DL
HGB BLD-MCNC: 14.1 G/DL (ref 11.1–15.9)
HGB UR QL STRIP: NEGATIVE
KETONES UR QL STRIP: NEGATIVE
LDLC SERPL CALC-MCNC: 61 MG/DL (ref 0–99)
LEUKOCYTE ESTERASE UR QL STRIP: NEGATIVE
MCH RBC QN AUTO: 30.9 PG (ref 26.6–33)
MCHC RBC AUTO-ENTMCNC: 34.1 G/DL (ref 31.5–35.7)
MCV RBC AUTO: 91 FL (ref 79–97)
MICRO URNS: NORMAL
MICROALBUMIN UR-MCNC: <3 UG/ML
NITRITE UR QL STRIP: NEGATIVE
PH UR STRIP: 5.5 [PH] (ref 5–7.5)
PLATELET # BLD AUTO: 260 X10E3/UL (ref 150–379)
POTASSIUM SERPL-SCNC: 4.2 MMOL/L (ref 3.5–5.2)
PROT SERPL-MCNC: 7.7 G/DL (ref 6–8.5)
PROT UR QL STRIP: NEGATIVE
RBC # BLD AUTO: 4.56 X10E6/UL (ref 3.77–5.28)
SODIUM SERPL-SCNC: 141 MMOL/L (ref 134–144)
SP GR UR: 1.01 (ref 1–1.03)
TRIGL SERPL-MCNC: 121 MG/DL (ref 0–149)
TSH SERPL DL<=0.005 MIU/L-ACNC: 5.11 UIU/ML (ref 0.45–4.5)
UROBILINOGEN UR STRIP-MCNC: 0.2 MG/DL (ref 0.2–1)
VLDLC SERPL CALC-MCNC: 24 MG/DL (ref 5–40)
WBC # BLD AUTO: 11.7 X10E3/UL (ref 3.4–10.8)

## 2017-08-23 RX ORDER — LEVOTHYROXINE SODIUM 100 UG/1
100 TABLET ORAL
Qty: 30 TAB | Refills: 1 | Status: SHIPPED | OUTPATIENT
Start: 2017-08-23 | End: 2017-09-25 | Stop reason: SDUPTHER

## 2017-08-23 NOTE — PROGRESS NOTES
Labs stable except thyroid low  Start levothyroxine 100 mcg qam on empty stomach with water, etc  Recheck TSH 1 month

## 2017-08-30 RX ORDER — SERTRALINE HYDROCHLORIDE 50 MG/1
TABLET, FILM COATED ORAL
Qty: 30 TAB | Refills: 3 | Status: SHIPPED | OUTPATIENT
Start: 2017-08-30 | End: 2018-01-10 | Stop reason: SDUPTHER

## 2017-08-30 RX ORDER — METFORMIN HYDROCHLORIDE 500 MG/1
TABLET ORAL
Qty: 60 TAB | Refills: 3 | Status: SHIPPED | OUTPATIENT
Start: 2017-08-30 | End: 2018-01-10 | Stop reason: SDUPTHER

## 2017-09-12 ENCOUNTER — OFFICE VISIT (OUTPATIENT)
Dept: INTERNAL MEDICINE CLINIC | Age: 62
End: 2017-09-12

## 2017-09-12 VITALS
SYSTOLIC BLOOD PRESSURE: 140 MMHG | HEART RATE: 78 BPM | BODY MASS INDEX: 40.02 KG/M2 | WEIGHT: 212 LBS | HEIGHT: 61 IN | DIASTOLIC BLOOD PRESSURE: 64 MMHG

## 2017-09-12 DIAGNOSIS — S86.911A KNEE STRAIN, RIGHT, INITIAL ENCOUNTER: Primary | ICD-10-CM

## 2017-09-12 RX ORDER — PREDNISONE 5 MG/1
TABLET ORAL
Qty: 30 TAB | Refills: 0 | Status: SHIPPED | OUTPATIENT
Start: 2017-09-12 | End: 2017-10-27 | Stop reason: ALTCHOICE

## 2017-09-12 NOTE — PROGRESS NOTES
This note will not be viewable in [address] Subjective:     Mrs. Ros Sierra presents with 2 or 3 weeks of right knee pain. The patient states that she was playing with 1 of her grandchildren when she injured the knee. The pain is felt along the lateral and posterior aspect of the knee affects her ability to bear weight and has been limping. In addition she has been using a walker because of the discomfort. The knee has not locked or given out on her. She has been applying ice which makes it feel better and is also been taking Advil or Aleve with some control of her discomfort. She denies any rash or radicular discomfort.       Past Medical History:   Diagnosis Date    Abscess of chest wall 8/10/2017    Acute asthmatic bronchitis 8/10/2017    Breast cancer ([address] 2015    Rt mastectomy    Cancer ([address]     right breast    Cigarette nicotine dependence in remission 8/22/2017    Diabetes [facility]     Dyspepsia and other specified disorders of function of stomach     GERD (gastroesophageal reflux disease)     GERD without esophagitis 8/22/2017    Hyperglycemia 8/10/2017    Hypertension     Impacted cerumen of right ear 8/10/2017    Mood swings ([address] 8/10/2017    Nausea & vomiting     Obesity 8/10/2017    Urticaria 8/10/2017     Past Surgical History:   Procedure Laterality Date    HX APPENDECTOMY  1970    HX BREAST BIOPSY Right 1/19/2016    RIGHT BREAST DEBRIDEMENT OF MASTECTOMY SITE performed by Pravin Hanson MD at [address]  [facility]    tonsillectomy    HX MASTECTOMY Right 12/3/2015    RIGHT BREAST SIMPLE MASTECTOMY WITH RIGHT SENTINEL NODE BIOPSY performed by [facility]ravin [facility]on MD at [facility]     Allergies   Allergen Reactions    Latex Rash    Antihistamine [Diphenhydramine Hcl] Unknown (comments)    Iodine Rash    Novocain [Procaine] Palpitations     Current Outpatient Prescriptions   Medication Sig Dispense Refill    predniSONE (DELTASONE) 5 mg tablet 5 tablets day for days 1 through 4, then 4 tablets on day 5, then 3 tablets on day 6, then 2 tablets on day 7, then 1 tablet on day 8. 30 Tab 0    metFORMIN (GLUCOPHAGE) 500 mg tablet TAKE 1 (ONE) TABLET , ORAL, TWO TIMES DAILY 60 Tab 3    sertraline (ZOLOFT) 50 mg tablet TAKE 1 TABLET BY MOUTH EVERY DAY 30 Tab 3    levothyroxine (SYNTHROID) 100 mcg tablet Take 1 Tab by mouth Daily (before breakfast). 30 Tab 1    ergocalciferol (ERGOCALCIFEROL) 50,000 unit capsule TAKE 1 EACH WEEK FOR 12 WEEKS, THEN STOP  0    valsartan-hydroCHLOROthiazide (DIOVAN-HCT) 80-12.5 mg per tablet TAKE 1 TABLET EVERY DAY  12    letrozole (FEMARA) 2.5 mg tablet   11    Omeprazole delayed release (PRILOSEC D/R) 20 mg tablet Take 20 mg by mouth daily. Social History     Social History    Marital status:      Spouse name: N/A    Number of children: N/A    Years of education: N/A     Social History Main Topics    Smoking status: Former Smoker     Packs/day: 1.00     Years: 30.00     Types: Cigarettes     Quit date: 12/3/2015    Smokeless tobacco: Former User     Quit date: 8/7/2016      Comment: vaped for one year after quitting smoking     Alcohol use No    Drug use: No    Sexual activity: Not Asked     Other Topics Concern    None     Social History Narrative     Family History   Problem Relation Age of Onset    Cancer Father      lung    Osteoporosis Mother     Arthritis-osteo Sister     Cancer Paternal Aunt      breast cancer, late 64's-early 75's       Review of Systems:  GEN: no weight loss, weight gain, fatigue or night sweats  EXT: denies edema, claudication. Positive right knee pain with weight bearing  Neurological ROS: no TIA or stroke symptoms  ROS otherwise negative      Objective:     Visit Vitals    /64 (BP 1 Location: Right arm, BP Patient Position: Sitting)    Pulse 78    Ht 5' 1\" (1.549 m)    Wt 212 lb (96.2 kg)    BMI 40.06 kg/m2     Body mass index is 40.06 kg/(m^2).     General:   alert, cooperative and no distress   Extremities: ROM of right knee normal. No warmth or redness noted. No subpatellar effusion. Drawer negative. Positive pain along the lateral and posterior joint line. No LE edema. Peripheral pulsls intact   Neuro: ..alert, oriented x3,speech normal in context and clarity, cranial nerves II-XII intact,motor strength: full proximally and distally,gait: normal  reflexes: full and symmetric     Physical exam otherwise negative         Assessment/Plan:     Diagnoses and all orders for this visit:    Knee strain, right, initial encounter  -     predniSONE (DELTASONE) 5 mg tablet; 5 tablets day for days 1 through 4, then 4 tablets on day 5, then 3 tablets on day 6, then 2 tablets on day 7, then 1 tablet on day 8., Normal, Disp-30 Tab, R-0        Other instructions: This is probably a simple strain but I cannot rule out an internal derangement of the knee. We will stop her Motrin and Aleve and placed on a steroid taper. She was warned that this may affect her blood sugar control. Continue WBAT    If no improvement over the next 2 weeks would consider an MRI of the knee. Follow-up Disposition:  Return if symptoms worsen or fail to improve.     Amanda Braun MD

## 2017-09-12 NOTE — MR AVS SNAPSHOT
Visit Information Date & Time Provider Department Dept. Phone Encounter #  
 9/12/2017  2:50 PM Linda Madrigal MD [facility] [id number] Follow-up Instructions Return if symptoms worsen or fail to improve. Your Appointments 2/21/2018 11:00 AM  
FOLLOW UP 10 with Linda Madrigal MD  
Col[address] Rob[address] ([address]) Appt Note: 6 mo fu  
 [address] [id number] [address] [address]  
  
    
 8/9/2018  1:30 PM  
Follow Up with PAO Moreno 22 ([address]) Appt Note: 1 year follow up / cp $35 ? ct 8-7-17  
 [address] [address]  
[address] [facility] Upcoming Health Maintenance Date Due Hepatitis C Screening 1955 FOOT EXAM Q1 8/17/1964 EYE EXAM RETINAL OR DILATED Q1 8/17/1964 Pneumococcal 19-64 Highest Risk (1 of 3 - PCV13) 8/17/1973 DTaP/Tdap/Td series (1 - Tdap) 8/17/1975 PAP AKA CERVICAL CYTOLOGY 8/17/1975 FOBT Q 1 YEAR AGE 50-75 8/17/2004 ZOSTER VACCINE AGE 60> 6/17/2014 INFLUENZA AGE 9 TO ADULT 8/1/2017 HEMOGLOBIN A1C Q6M 2/22/2018 MICROALBUMIN Q1 8/22/2018 LIPID PANEL Q1 8/22/2018 BREAST CANCER SCRN MAMMOGRAM 10/25/2018 Allergies as of 9/12/2017  Review Complete On: 9/12/2017 By: Linda Madrigal MD  
  
 Severity Noted Reaction Type Reactions Latex  05/23/2012    Rash Antihistamine [Diphenhydramine Hcl]  08/10/2017    Unknown (comments) Iodine  05/23/2012    Rash Novocain [Procaine]  05/23/2012    Palpitations Current Immunizations  Never Reviewed Name Date Influenza Vaccine (Quad) PF 12/4/2015 10:36 AM  
  
 Not reviewed this visit You Were Diagnosed With   
  
 Codes Comments Knee strain, right, initial encounter    -  Primary ICD-10-CM: L31.342E ICD-9-CM: 353. 9 Vitals BP Pulse Height(growth percentile) Weight(growth percentile) BMI OB Status 140/64 (BP 1 Location: Right arm, BP Patient Position: Sitting) 78 5' 1\" (1.549 m) 212 lb (96.2 kg) 40.06 kg/m2 Postmenopausal  
 Smoking Status Former Smoker BMI and BSA Data Body Mass Index Body Surface Area 40.06 kg/m 2 2.03 m 2 Preferred Pharmacy Pharmacy Name Phone [facility]/PHARMACY #[id number] [place] [id number] [phone] Your Updated Medication List  
  
   
This list is accurate as of: 17  3:13 PM.  Always use your most recent med list.  
  
  
  
  
 ergocalciferol 50,000 unit capsule Commonly known as:  ERGOCALCIFEROL  
TAKE 1 EACH WEEK FOR 12 WEEKS, THEN STOP  
  
 letrozole 2.5 mg tablet Commonly known as:  FEMARA  
  
 levothyroxine 100 mcg tablet Commonly known as:  SYNTHROID Take 1 Tab by mouth Daily (before breakfast). metFORMIN 500 mg tablet Commonly known as:  GLUCOPHAGE  
TAKE 1 (ONE) TABLET , ORAL, TWO TIMES DAILY Omeprazole delayed release 20 mg tablet Commonly known as:  PRILOSEC D/R Take 20 mg by mouth daily. predniSONE 5 mg tablet Commonly known as:  DELTASONE  
5 tablets day for days 1 through 4, then 4 tablets on day 5, then 3 tablets on day 6, then 2 tablets on day 7, then 1 tablet on day 8.  
  
 sertraline 50 mg tablet Commonly known as:  ZOLOFT  
TAKE 1 TABLET BY MOUTH EVERY DAY  
  
 valsartan-hydroCHLOROthiazide 80-12.5 mg per tablet Commonly known as:  DIOVAN-HCT  
TAKE 1 TABLET EVERY DAY Prescriptions Sent to Pharmacy Refills  
 predniSONE (DELTASONE) 5 mg tablet 0 Si tablets day for days 1 through 4, then 4 tablets on day 5, then 3 tablets on day 6, then 2 tablets on day 7, then 1 tablet on day 8.   
 Class: Normal  
 Pharmacy: [facility]/pharmacy #[id number] Edilberto Abreu, [address]  #: [phone] Follow-up Instructions Return if symptoms worsen or fail to improve. Introducing [facility] SERVICES! Dear Giancarlo Sensor: Thank you for requesting a [employer] account. Our records indicate that you have previously registered for a [employer] account but its currently inactive. Please call our [employer] support line at [phone]. Additional Information If you have questions, please visit the Frequently Asked Questions section of the [employer] website at [email or web address] Remember, [employer] is NOT to be used for urgent needs. For medical emergencies, dial 911. Now available from your iPhone and Android! Please provide this summary of care documentation to your next provider. Your primary care clinician is listed as OSCAR Gibbons. If you have any questions after today's visit, please call [phone].

## 2017-09-25 DIAGNOSIS — E03.9 ACQUIRED HYPOTHYROIDISM: ICD-10-CM

## 2017-09-25 RX ORDER — LEVOTHYROXINE SODIUM 100 UG/1
TABLET ORAL
Qty: 30 TAB | Refills: 1 | Status: SHIPPED | OUTPATIENT
Start: 2017-09-25 | End: 2017-12-11 | Stop reason: SDUPTHER

## 2017-09-25 NOTE — TELEPHONE ENCOUNTER
Requested Prescriptions     Pending Prescriptions Disp Refills    levothyroxine (SYNTHROID) 100 mcg tablet [Pharmacy Med Name: LEVOTHYROXINE 100 MCG TABLET] 30 Tab 1     Sig: TAKE 1 TABLET EVERY DAY BEFORE BREAKFAST       Last Refill: 8-23-17  Last visit:9/12/2017

## 2017-10-27 ENCOUNTER — OFFICE VISIT (OUTPATIENT)
Dept: INTERNAL MEDICINE CLINIC | Age: 62
End: 2017-10-27

## 2017-10-27 VITALS
SYSTOLIC BLOOD PRESSURE: 144 MMHG | BODY MASS INDEX: 40.4 KG/M2 | WEIGHT: 214 LBS | DIASTOLIC BLOOD PRESSURE: 80 MMHG | TEMPERATURE: 97.9 F | HEIGHT: 61 IN

## 2017-10-27 DIAGNOSIS — Z23 ENCOUNTER FOR IMMUNIZATION: ICD-10-CM

## 2017-10-27 DIAGNOSIS — N39.0 URINARY TRACT INFECTION WITHOUT HEMATURIA, SITE UNSPECIFIED: Primary | ICD-10-CM

## 2017-10-27 NOTE — PROGRESS NOTES
This note will not be viewable in [address] Subjective:     Mrs. Dave Bartlett presents to the office today with complaints of urinary tract infection which began 6 days ago. The patient developed abdominal pain some nausea and hematuria. She was seen at [facility]. She was found to have urinary tract infection and placed on Macrobid which she finished yesterday. She returns to have her urine rechecked for infection and blood. She is having no back or abdominal pain. She denies dysuria or frequency. There is been no fevers.     Past Medical History:   Diagnosis Date    Abscess of chest wall 8/10/2017    Acute asthmatic bronchitis 8/10/2017    Breast cancer ([facility] 2015    Rt mastectomy    Cancer ([facility]     right breast    Cigarette nicotine dependence in remission 8/22/2017    Diabetes ([facility]     Dyspepsia and other specified disorders of function of stomach     GERD (gastroesophageal reflux disease)     GERD without esophagitis 8/22/2017    Hyperglycemia 8/10/2017    Hypertension     Impacted cerumen of right ear 8/10/2017    Mood swings ([facility] 8/10/2017    Nausea & vomiting     Obesity 8/10/2017    Urticaria 8/10/2017     Past Surgical History:   Procedure Laterality Date    HX APPENDECTOMY  1970    HX BREAST BIOPSY Right 1/19/2016    RIGHT BREAST DEBRIDEMENT OF MASTECTOMY SITE performed by Rubén Shelton MD at [address]  [facility]    tonsillectomy    HX MASTECTOMY Right 12/3/2015    RIGHT BREAST SIMPLE MASTECTOMY WITH RIGHT SENTINEL NODE BIOPSY performed by Rubén Shelton MD at [facility] AMBULATORY OR     Allergies   Allergen Reactions    Latex Rash    Antihistamine [Diphenhydramine Hcl] Unknown (comments)    Iodine Rash    Novocain [Procaine] Palpitations     Current Outpatient Prescriptions   Medication Sig Dispense Refill    levothyroxine (SYNTHROID) 100 mcg tablet TAKE 1 TABLET EVERY DAY BEFORE BREAKFAST 30 Tab 1    metFORMIN (GLUCOPHAGE) 500 mg tablet TAKE 1 (ONE) TABLET , ORAL, TWO TIMES DAILY 60 Tab 3    sertraline (ZOLOFT) 50 mg tablet TAKE 1 TABLET BY MOUTH EVERY DAY 30 Tab 3    valsartan-hydroCHLOROthiazide (DIOVAN-HCT) 80-12.5 mg per tablet TAKE 1 TABLET EVERY DAY  12    letrozole (FEMARA) 2.5 mg tablet   11    Omeprazole delayed release (PRILOSEC D/R) 20 mg tablet Take 20 mg by mouth daily. Social History     Social History    Marital status:      Spouse name: N/A    Number of children: N/A    Years of education: N/A     Social History Main Topics    Smoking status: Former Smoker     Packs/day: 1.00     Years: 30.00     Types: Cigarettes     Quit date: 12/3/2015    Smokeless tobacco: Former User     Quit date: 8/7/2016      Comment: vaped for one year after quitting smoking     Alcohol use No    Drug use: No    Sexual activity: Not Asked     Other Topics Concern    None     Social History Narrative     Family History   Problem Relation Age of Onset    Cancer Father      lung    Osteoporosis Mother     Arthritis-osteo Sister     Cancer Paternal Aunt      breast cancer, late 64's-early 75's       Review of Systems:  GEN: no weight loss, weight gain, fatigue or night sweats  CV: no PND, orthopnea, or palpitations  Resp: no dyspnea on exertion, no cough  Abd: no nausea, vomiting or diarrhea  EXT: denies edema, claudication  Endocrine: no hair loss, excessive thirst or polyuria  Neurological ROS: no TIA or stroke symptoms  ROS otherwise negative      Objective:     Visit Vitals    /80 (BP 1 Location: Right arm, BP Patient Position: Sitting)    Temp 97.9 °F (36.6 °C) (Oral)    Ht 5' 1\" (1.549 m)    Wt 214 lb (97.1 kg)    BMI 40.43 kg/m2     Body mass index is 40.43 kg/(m^2). General:   alert, cooperative and no distress   Eyes: conjunctivae/sclerae clear.  PERRL, EOM's intact   Mouth:  No oral lesions, no pharyngeal erythema, no exudates   Neck: Trachea midline, no thyromegaly, no bruits   Heart: S1 and S2 normal,no murmurs noted    Lungs: Clear to auscultation bilaterally, no increased work of breathing   Abdomen: Soft, nontender. Normal bowel sounds   Extremities: No edema or cyanosis   Neuro: ..alert, oriented x3,speech normal in context and clarity, cranial nerves II-XII intact,motor strength: full proximally and distally,gait: normal  reflexes: full and symmetric     Physical exam otherwise negative         Assessment/Plan:     Diagnoses and all orders for this visit:    Urinary tract infection without hematuria, site unspecified  -     URINALYSIS W/ RFLX MICROSCOPIC  -     CULTURE, URINE    Encounter for immunization  -     Influenza virus vaccine (QUADRIVALENT PRES FREE SYRINGE) IM (32087)  -     Pneumococcal polysaccharide vaccine, 23-valent, adult or immunosuppressed pt dose  -     NM IMMUNIZ ADMIN,1 SINGLE/COMB VAC/TOXOID        Other instructions: The patient's examination is benign.     A urinalysis and urine culture will be sent    Body mass index is 40.4 and dietary counseling is given along with a handout sheet    Quadrivalent influenza and Pneumovax 23 given    Follow-up as previously scheduled    Follow-up Disposition: Not on Dulce Perez MD

## 2017-10-27 NOTE — PROGRESS NOTES
Vickie Muñiz is a 60 y.o. female presenting for Bladder Infection  . 1. Have you been to the ER, urgent care clinic since your last visit? Hospitalized since your last visit? Yes When: 10-21-17 Where: [facility] Reason for visit: UTI. 2. Have you seen or consulted any other health care providers outside of the [address] [place] [address] since your last visit? Include any pap smears or colon screening. No    No flowsheet data found. Abuse Screening Questionnaire 9/12/2017   Do you ever feel afraid of your partner? N   Are you in a relationship with someone who physically or mentally threatens you? N   Is it safe for you to go home? Y       No flowsheet data found. There are no discontinued medications.

## 2017-10-27 NOTE — PATIENT INSTRUCTIONS
Vaccine Information Statement    Influenza (Flu) Vaccine (Inactivated or Recombinant): What you need to know    Many Vaccine Information Statements are available in French and other languages. See www.immunize.org/vis  Hojas de Información Sobre Vacunas están disponibles en Español y en muchos otros idiomas. Visite www.immunize.org/vis    1. Why get vaccinated? Influenza (flu) is a contagious disease that spreads around the [place] every year, usually between October and May. Flu is caused by influenza viruses, and is spread mainly by coughing, sneezing, and close contact. Anyone can get flu. Flu strikes suddenly and can last several days. Symptoms vary by age, but can include:   fever/chills   sore throat   muscle aches   fatigue   cough   headache    runny or stuffy nose    Flu can also lead to pneumonia and blood infections, and cause diarrhea and seizures in children. If you have a medical condition, such as heart or lung disease, flu can make it worse. Flu is more dangerous for some people. Infants and young children, people 72years of age and older, pregnant women, and people with certain health conditions or a weakened immune system are at greatest risk. Each year thousands of people in the [facility] die from flu, and many more are hospitalized. Flu vaccine can:   keep you from getting flu,   make flu less severe if you do get it, and   keep you from spreading flu to your family and other people. 2. Inactivated and recombinant flu vaccines    A dose of flu vaccine is recommended every flu season. Children 6 months through 6years of age may need two doses during the same flu season. Everyone else needs only one dose each flu season.        Some inactivated flu vaccines contain a very small amount of a mercury-based preservative called thimerosal. Studies have not shown thimerosal in vaccines to be harmful, but flu vaccines that do not contain thimerosal are available. There is no live flu virus in flu shots. They cannot cause the flu. There are many flu viruses, and they are always changing. Each year a new flu vaccine is made to protect against three or four viruses that are likely to cause disease in the upcoming flu season. But even when the vaccine doesnt exactly match these viruses, it may still provide some protection    Flu vaccine cannot prevent:   flu that is caused by a virus not covered by the vaccine, or   illnesses that look like flu but are not. It takes about 2 weeks for protection to develop after vaccination, and protection lasts through the flu season. 3. Some people should not get this vaccine    Tell the person who is giving you the vaccine:     If you have any severe, life-threatening allergies. If you ever had a life-threatening allergic reaction after a dose of flu vaccine, or have a severe allergy to any part of this vaccine, you may be advised not to get vaccinated. Most, but not all, types of flu vaccine contain a small amount of egg protein.  If you ever had Guillain-Barré Syndrome (also called GBS). Some people with a history of GBS should not get this vaccine. This should be discussed with your doctor.  If you are not feeling well. It is usually okay to get flu vaccine when you have a mild illness, but you might be asked to come back when you feel better. 4. Risks of a vaccine reaction    With any medicine, including vaccines, there is a chance of reactions. These are usually mild and go away on their own, but serious reactions are also possible. Most people who get a flu shot do not have any problems with it.      Minor problems following a flu shot include:    soreness, redness, or swelling where the shot was given     hoarseness   sore, red or itchy eyes   cough   fever   aches   headache   itching   fatigue  If these problems occur, they usually begin soon after the shot and last 1 or 2 days. More serious problems following a flu shot can include the following:     There may be a small increased risk of Guillain-Barré Syndrome (GBS) after inactivated flu vaccine. This risk has been estimated at 1 or 2 additional cases per million people vaccinated. This is much lower than the risk of severe complications from flu, which can be prevented by flu vaccine.  Young children who get the flu shot along with pneumococcal vaccine (PCV13) and/or DTaP vaccine at the same time might be slightly more likely to have a seizure caused by fever. Ask your doctor for more information. Tell your doctor if a child who is getting flu vaccine has ever had a seizure. Problems that could happen after any injected vaccine:      People sometimes faint after a medical procedure, including vaccination. Sitting or lying down for about 15 minutes can help prevent fainting, and injuries caused by a fall. Tell your doctor if you feel dizzy, or have vision changes or ringing in the ears.  Some people get severe pain in the shoulder and have difficulty moving the arm where a shot was given. This happens very rarely.  Any medication can cause a severe allergic reaction. Such reactions from a vaccine are very rare, estimated at about 1 in a million doses, and would happen within a few minutes to a few hours after the vaccination. As with any medicine, there is a very remote chance of a vaccine causing a serious injury or death. The safety of vaccines is always being monitored. For more information, visit: www.cdc.gov/vaccinesafety/    5. What if there is a serious reaction? What should I look for?  Look for anything that concerns you, such as signs of a severe allergic reaction, very high fever, or unusual behavior.     Signs of a severe allergic reaction can include hives, swelling of the face and throat, difficulty breathing, a fast heartbeat, dizziness, and weakness  usually within a few minutes to a few hours after the vaccination. What should I do?  If you think it is a severe allergic reaction or other emergency that cant wait, call 9-1-1 and get the person to the nearest hospital. Otherwise, call your doctor.  Reactions should be reported to the Vaccine Adverse Event Reporting System (VAERS). Your doctor should file this report, or you can do it yourself through  the VAERS web site at www.vaers. [facility].gov, or by calling 1-934.501.6554. VAERS does not give medical advice. 6. The National Vaccine Injury Compensation Program    The [facility] Vaccine Injury Compensation Program (VICP) is a federal program that was created to compensate people who may have been injured by certain vaccines. Persons who believe they may have been injured by a vaccine can learn about the program and about filing a claim by calling 9-773.153.7343 or visiting the [employer] website at www.[facility].gov/vaccinecompensation. There is a time limit to file a claim for compensation. 7. How can I learn more?  Ask your healthcare provider. He or she can give you the vaccine package insert or suggest other sources of information.  Call your local or state health department.  Contact the Centers for Disease Control and Prevention (CDC):  - Call 8-763.715.5498 (1-800-CDC-INFO) or  - Visit CDCs website at www.cdc.gov/flu    Vaccine Information Statement   Inactivated Influenza Vaccine   8/7/2015  [address] [id number]    Department of Health and Human Services  Centers for Disease Control and Prevention    Office Use Only    Vaccine Information Statement    Pneumococcal Polysaccharide Vaccine: What You Need to Know    Many Vaccine Information Statements are available in [place]ese and other languages. See www.immunize.org/vis. Hojas de información Sobre Vacunas están disponibles en español y en muchos otros idiomas. Visite Daniela.si. 1. Why get vaccinated?     Vaccination can protect older adults (and some children and younger adults) from pneumococcal disease. Pneumococcal disease is caused by bacteria that can spread from person to person through close contact. It can cause ear infections, and it can also lead to more serious infections of the:   Lungs (pneumonia),   Blood (bacteremia), and   Covering of the brain and spinal cord (meningitis). Meningitis can cause deafness and brain damage, and it can be fatal.      Anyone can get pneumococcal disease, but children under 3years of age, people with certain medical conditions, adults over 72years of age, and cigarette smokers are at the highest risk. About 18,000 older adults die each year from pneumococcal disease in the [place]. Treatment of pneumococcal infections with penicillin and other drugs used to be more effective. But some strains of the disease have become resistant to these drugs. This makes prevention of the disease, through vaccination, even more important. 2. Pneumococcal polysaccharide vaccine (PPSV23)    Pneumococcal polysaccharide vaccine (PPSV23) protects against 23 types of pneumococcal bacteria. It will not prevent all pneumococcal disease. PPSV23 is recommended for:   All adults 72years of age and older,   Anyone 2 through 59years of age with certain long-term health problems,   Anyone 2 through 59years of age with a weakened immune system,   Adults 23 through 59years of age who smoke cigarettes or have asthma. Most people need only one dose of PPSV. A second dose is recommended for certain high-risk groups. People 72 and older should get a dose even if they have gotten one or more doses of the vaccine before they turned 65. Your healthcare provider can give you more information about these recommendations. Most healthy adults develop protection within 2 to 3 weeks of getting the shot.      3. Some people should not get this vaccine     Anyone who has had a life-threatening allergic reaction to PPSV should not get another dose.  Anyone who has a severe allergy to any component of PPSV should not receive it. Tell your provider if you have any severe allergies.  Anyone who is moderately or severely ill when the shot is scheduled may be asked to wait until they recover before getting the vaccine. Someone with a mild illness can usually be vaccinated.  Children less than 3years of age should not receive this vaccine.  There is no evidence that PPSV is harmful to either a pregnant woman or to her fetus. However, as a precaution, women who need the vaccine should be vaccinated before becoming pregnant, if possible. 4. Risks of a vaccine reaction    With any medicine, including vaccines, there is a chance of side effects. These are usually mild and go away on their own, but serious reactions are also possible. About half of people who get PPSV have mild side effects, such as redness or pain where the shot is given, which go away within about two days. Less than 1 out of 100 people develop a fever, muscle aches, or more severe local reactions. Problems that could happen after any vaccine:     People sometimes faint after a medical procedure, including vaccination. Sitting or lying down for about 15 minutes can help prevent fainting, and injuries caused by a fall. Tell your doctor if you feel dizzy, or have vision changes or ringing in the ears.  Some people get severe pain in the shoulder and have difficulty moving the arm where a shot was given. This happens very rarely.  Any medication can cause a severe allergic reaction. Such reactions from a vaccine are very rare, estimated at about 1 in a million doses, and would happen within a few minutes to a few hours after the vaccination. As with any medicine, there is a very remote chance of a vaccine causing a serious injury or death. The safety of vaccines is always being monitored.   For more information, visit: www.cdc.gov/vaccinesafety/     5. What if there is a serious reaction? What should I look for? Look for anything that concerns you, such as signs of a severe allergic reaction, very high fever, or unusual behavior. Signs of a severe allergic reaction can include hives, swelling of the face and throat, difficulty breathing, a fast heartbeat, dizziness, and weakness. These would usually start a few minutes to a few hours after the vaccination. What should I do? If you think it is a severe allergic reaction or other emergency that cant wait, call 9-1-1 or get to the nearest hospital. Otherwise, call your doctor. Afterward, the reaction should be reported to the Vaccine Adverse Event Reporting System (VAERS). Your doctor might file this report, or you can do it yourself through the VAERS web site at www.vaers. [facility].gov, or by calling 1-802.575.1190. VAERS does not give medical advice. 6. How can I learn more?  Ask your doctor. He or she can give you the vaccine package insert or suggest other sources of information.  Call your local or state health department.  Contact the Centers for Disease Control and Prevention (CDC):  - Call 5-117.717.2671 (6-421-HAL-INFO) or  - Visit CDCs website at [address] 04/24/2015    [facility] for Disease Control and Prevention    Office Use Only       Learning About Cutting Calories  How do calories affect your weight? Food gives your body energy. Energy from the food you eat is measured in calories. This energy keeps your heart beating, your brain active, and your muscles working. Your body needs a certain number of calories each day. After your body uses the calories it needs, it stores extra calories as fat. To lose weight safely, you have to eat fewer calories while eating in a healthy way. How many calories do you need each day?   The more active you are, the more calories you need. When you are less active, you need fewer calories. How many calories you need each day also depends on several things, including your age and whether you are male or female. Here are some general guidelines for adults:  · Less active women and older adults need 1,600 to 2,000 calories each day. · Active women and less active men need 2,000 to 2,400 calories each day. · Active men need 2,400 to 3,000 calories each day. How can you cut calories and eat healthy meals? Whole grains, vegetables and fruits, and dried beans are good lower-calorie foods. They give you lots of nutrients and fiber. And they fill you up. Sweets, energy drinks, and soda pop are high in calories. They give you few nutrients and no fiber. Try to limit soda pop, fruit juice, and energy drinks. Drink water instead. Some fats can be part of a healthy diet. But cutting back on fats from highly processed foods like fast foods and many snack foods is a good way to lower the calories in your diet. Also, use smaller amounts of fats like butter, margarine, salad dressing, and mayonnaise. Add fresh garlic, lemon, or herbs to your meals to add flavor without adding fat. Meats and dairy products can be a big source of hidden fats. Try to choose lean or low-fat versions of these products. Fat-free cookies, candies, chips, and frozen treats can still be high in sugar and calories. Some fat-free foods have more calories than regular ones. Eat fat-free treats in moderation, as you would other foods. If your favorite foods are high in fat, salt, sugar, or calories, limit how often you eat them. Eat smaller servings, or look for healthy substitutes. Fill up on fruits, vegetables, and whole grains. Eating at home  · Use meat as a side dish instead of as the main part of your meal.  · Try main dishes that use whole wheat pasta, brown rice, dried beans, or vegetables.   · Find ways to cook with little or no fat, such as broiling, steaming, or grilling. · Use cooking spray instead of oil. If you use oil, use a monounsaturated oil, such as canola or olive oil. · Trim fat from meats before you cook them. · Drain off fat after you brown the meat or while you roast it. · Chill soups and stews after you cook them. Then skim the fat off the top after it hardens. Eating out  · Order foods that are broiled or poached rather than fried or breaded. · Cut back on the amount of butter or margarine that you use on bread. · Order sauces, gravies, and salad dressings on the side, and use only a little. · When you order pasta, choose tomato sauce rather than cream sauce. · Ask for salsa with your baked potato instead of sour cream, butter, cheese, or mondragon. · Order meals in a small size instead of upgrading to a large. · Share an entree, or take part of your food home to eat as another meal.  · Share appetizers and desserts. Where can you learn more? Go to http://josé luis-nicole.info/. Enter 99 [id number] in the search box to learn more about \"Learning About Cutting Calories. \"  Current as of: May 12, 2017  Content Version: 11.4  © 9125-4701 [employer]. Care instructions adapted under license by [employer] (which disclaims liability or warranty for this information). If you have questions about a medical condition or this instruction, always ask your healthcare professional. [place] [address] any warranty or liability for your use of this information.

## 2017-10-27 NOTE — MR AVS SNAPSHOT
Visit Information Date & Time Provider Department Dept. Phone Encounter #  
 10/27/2017 10:50 AM Armani Castaneda, [address] [id number] Follow-up Instructions Return for As previously scheduled. Your Appointments 2/21/2018 11:00 AM  
FOLLOW UP 10 with Armani Castaneda MD  
[facility] [address] ([address]) Appt Note: 6 mo fu  
 [address] [address] [address] [address]  
  
    
 8/9/2018  1:30 PM  
Follow Up with PA[facility] [place] [facility] ([address]) Appt Note: 1 year follow up / cp $35 ? ct 8-7-17  
 [address] [address]  
[address] [facility] Upcoming Health Maintenance Date Due Hepatitis C Screening 1955 FOOT EXAM Q1 8/17/1964 EYE EXAM RETINAL OR DILATED Q1 8/17/1964 Pneumococcal 19-64 Highest Risk (1 of 3 - PCV13) 8/17/1973 DTaP/Tdap/Td series (1 - Tdap) 8/17/1975 PAP AKA CERVICAL CYTOLOGY 8/17/1975 FOBT Q 1 YEAR AGE 50-75 8/17/2004 ZOSTER VACCINE AGE 60> 6/17/2014 INFLUENZA AGE 9 TO ADULT 8/1/2017 HEMOGLOBIN A1C Q6M 2/22/2018 MICROALBUMIN Q1 8/22/2018 LIPID PANEL Q1 8/22/2018 BREAST CANCER SCRN MAMMOGRAM 10/25/2018 Allergies as of 10/27/2017  Review Complete On: 10/27/2017 By: Armani Castaneda MD  
  
 Severity Noted Reaction Type Reactions Latex  05/23/2012    Rash Antihistamine [Diphenhydramine Hcl]  08/10/2017    Unknown (comments) Iodine  05/23/2012    Rash Novocain [Procaine]  05/23/2012    Palpitations Current Immunizations  Never Reviewed Name Date Influenza Vaccine (Quad) PF 10/27/2017, 12/4/2015 10:36 AM  
 Pneumococcal Polysaccharide (PPSV-23) 10/27/2017 Not reviewed this visit You Were Diagnosed With   
  
 Codes Comments Urinary tract infection without hematuria, site unspecified    -  Primary ICD-10-CM: N39.0 ICD-9-CM: 599.0 Encounter for immunization     ICD-10-CM: M54 ICD-9-CM: V03.89 Vitals BP Temp Height(growth percentile) Weight(growth percentile) BMI OB Status 144/80 (BP 1 Location: Right arm, BP Patient Position: Sitting) 97.9 °F (36.6 °C) (Oral) 5' 1\" (1.549 m) 214 lb (97.1 kg) 40.43 kg/m2 Postmenopausal  
 Smoking Status Former Smoker BMI and BSA Data Body Mass Index Body Surface Area 40.43 kg/m 2 2.04 m 2 Preferred Pharmacy Pharmacy Name Phone [id number] [address] [place] [address] [phone] Your Updated Medication List  
  
   
This list is accurate as of: 10/27/17 11:07 AM.  Always use your most recent med list.  
  
  
  
  
 letrozole 2.5 mg tablet Commonly known as:  FEMARA  
  
 levothyroxine 100 mcg tablet Commonly known as:  SYNTHROID  
TAKE 1 TABLET EVERY DAY BEFORE BREAKFAST  
  
 metFORMIN 500 mg tablet Commonly known as:  GLUCOPHAGE  
TAKE 1 (ONE) TABLET , ORAL, TWO TIMES DAILY Omeprazole delayed release 20 mg tablet Commonly known as:  PRILOSEC D/R Take 20 mg by mouth daily. sertraline 50 mg tablet Commonly known as:  ZOLOFT  
TAKE 1 TABLET BY MOUTH EVERY DAY  
  
 valsartan-hydroCHLOROthiazide 80-12.5 mg per tablet Commonly known as:  DIOVAN-HCT  
TAKE 1 TABLET EVERY DAY We Performed the Following CULTURE, URINE [id number] CPT(R)] INFLUENZA VIRUS VAC QUAD,SPLIT,PRESV FREE SYRINGE IM [id number] CPT(R)] PNEUMOCOCCAL POLYSACCHARIDE VACCINE, 23-VALENT, ADULT OR IMMUNOSUPPRESSED PT DOSE, [40124 CPT(R)] [place] IMMUNIZ ADMIN,1 SINGLE/COMB VAC/TOXOID [id number] CPT(R)] URINALYSIS W/ RFLX MICROSCOPIC [01590 CPT(R)] Follow-up Instructions Return for As previously scheduled. Patient Instructions Vaccine Information Statement Influenza (Flu) Vaccine (Inactivated or Recombinant): What you need to know Many Vaccine Information Statements are available in Mauritanian and other languages. See www.immunize.org/vis Hojas de Información Sobre Vacunas están disponibles en Español y en muchos otros idiomas. Visite www.immunize.org/vis 1. Why get vaccinated? Influenza (flu) is a contagious disease that spreads around the [place] every year, usually between October and May. Flu is caused by influenza viruses, and is spread mainly by coughing, sneezing, and close contact. Anyone can get flu. Flu strikes suddenly and can last several days. Symptoms vary by age, but can include: 
 fever/chills  sore throat  muscle aches  fatigue  cough  headache  runny or stuffy nose Flu can also lead to pneumonia and blood infections, and cause diarrhea and seizures in children. If you have a medical condition, such as heart or lung disease, flu can make it worse. Flu is more dangerous for some people. Infants and young children, people 72years of age and older, pregnant women, and people with certain health conditions or a weakened immune system are at greatest risk. Each year thousands of people in the [facility] die from flu, and many more are hospitalized. Flu vaccine can: 
 keep you from getting flu, 
 make flu less severe if you do get it, and 
 keep you from spreading flu to your family and other people. 2. Inactivated and recombinant flu vaccines A dose of flu vaccine is recommended every flu season. Children 6 months through 6years of age may need two doses during the same flu season. Everyone else needs only one dose each flu season.   
 
 
Some inactivated flu vaccines contain a very small amount of a mercury-based preservative called thimerosal. Studies have not shown thimerosal in vaccines to be harmful, but flu vaccines that do not contain thimerosal are available. There is no live flu virus in flu shots. They cannot cause the flu. There are many flu viruses, and they are always changing. Each year a new flu vaccine is made to protect against three or four viruses that are likely to cause disease in the upcoming flu season. But even when the vaccine doesnt exactly match these viruses, it may still provide some protection Flu vaccine cannot prevent: 
 flu that is caused by a virus not covered by the vaccine, or 
 illnesses that look like flu but are not. It takes about 2 weeks for protection to develop after vaccination, and protection lasts through the flu season. 3. Some people should not get this vaccine Tell the person who is giving you the vaccine:  If you have any severe, life-threatening allergies. If you ever had a life-threatening allergic reaction after a dose of flu vaccine, or have a severe allergy to any part of this vaccine, you may be advised not to get vaccinated. Most, but not all, types of flu vaccine contain a small amount of egg protein.  If you ever had Guillain-Barré Syndrome (also called GBS). Some people with a history of GBS should not get this vaccine. This should be discussed with your doctor.  If you are not feeling well. It is usually okay to get flu vaccine when you have a mild illness, but you might be asked to come back when you feel better. 4. Risks of a vaccine reaction With any medicine, including vaccines, there is a chance of reactions. These are usually mild and go away on their own, but serious reactions are also possible. Most people who get a flu shot do not have any problems with it. Minor problems following a flu shot include:  
 soreness, redness, or swelling where the shot was given  hoarseness  sore, red or itchy eyes  cough  fever  aches  headache  itching  fatigue If these problems occur, they usually begin soon after the shot and last 1 or 2 days. More serious problems following a flu shot can include the following:  There may be a small increased risk of Guillain-Barré Syndrome (GBS) after inactivated flu vaccine. This risk has been estimated at 1 or 2 additional cases per million people vaccinated. This is much lower than the risk of severe complications from flu, which can be prevented by flu vaccine.  Young children who get the flu shot along with pneumococcal vaccine (PCV13) and/or DTaP vaccine at the same time might be slightly more likely to have a seizure caused by fever. Ask your doctor for more information. Tell your doctor if a child who is getting flu vaccine has ever had a seizure. Problems that could happen after any injected vaccine:  People sometimes faint after a medical procedure, including vaccination. Sitting or lying down for about 15 minutes can help prevent fainting, and injuries caused by a fall. Tell your doctor if you feel dizzy, or have vision changes or ringing in the ears.  Some people get severe pain in the shoulder and have difficulty moving the arm where a shot was given. This happens very rarely.  Any medication can cause a severe allergic reaction. Such reactions from a vaccine are very rare, estimated at about 1 in a million doses, and would happen within a few minutes to a few hours after the vaccination. As with any medicine, there is a very remote chance of a vaccine causing a serious injury or death. The safety of vaccines is always being monitored. For more information, visit: www.cdc.gov/vaccinesafety/ 
 
5. What if there is a serious reaction? What should I look for?  Look for anything that concerns you, such as signs of a severe allergic reaction, very high fever, or unusual behavior.  
 
Signs of a severe allergic reaction can include hives, swelling of the face and throat, difficulty breathing, a fast heartbeat, dizziness, and weakness  usually within a few minutes to a few hours after the vaccination. What should I do?  If you think it is a severe allergic reaction or other emergency that cant wait, call 9-1-1 and get the person to the nearest hospital. Otherwise, call your doctor.  Reactions should be reported to the Vaccine Adverse Event Reporting System (VAERS). Your doctor should file this report, or you can do it yourself through  the VAERS web site at www.vaers. [facility].gov, or by calling 7-443.290.9896. VAERS does not give medical advice. 6. The National Vaccine Injury Compensation Program 
 
The [facility] Vaccine Injury Compensation Program (VICP) is a federal program that was created to compensate people who may have been injured by certain vaccines. Persons who believe they may have been injured by a vaccine can learn about the program and about filing a claim by calling 1-970.861.3238 or visiting the [employer] website at www.[facility].gov/vaccinecompensation. There is a time limit to file a claim for compensation. 7. How can I learn more?  Ask your healthcare provider. He or she can give you the vaccine package insert or suggest other sources of information.  Call your local or state health department.  Contact the Centers for Disease Control and Prevention (CDC): 
- Call 2-187.173.4283 (1-800-CDC-INFO) or 
- Visit CDCs website at www.cdc.gov/flu Vaccine Information Statement Inactivated Influenza Vaccine 8/7/2015 
42 MARY ANNESalomón Jimearlbrock 606FC-04 Department of Health and howsimple Centers for Disease Control and Prevention Office Use Only Vaccine Information Statement Pneumococcal Polysaccharide Vaccine: What You Need to Know Many Vaccine Information Statements are available in Polish and other languages. See www.immunize.org/vis.  
Hojas de información Sobre Vacunas están disponibles en español y en becki otros char. Visite Daniela.si. 1. Why get vaccinated? Vaccination can protect older adults (and some children and younger adults) from pneumococcal disease. Pneumococcal disease is caused by bacteria that can spread from person to person through close contact. It can cause ear infections, and it can also lead to more serious infections of the: 
 Lungs (pneumonia),  Blood (bacteremia), and 
 Covering of the brain and spinal cord (meningitis). Meningitis can cause deafness and brain damage, and it can be fatal.   
 
Anyone can get pneumococcal disease, but children under 3years of age, people with certain medical conditions, adults over 72years of age, and cigarette smokers are at the highest risk. About 18,000 older adults die each year from pneumococcal disease in the [place]. Treatment of pneumococcal infections with penicillin and other drugs used to be more effective. But some strains of the disease have become resistant to these drugs. This makes prevention of the disease, through vaccination, even more important. 2. Pneumococcal polysaccharide vaccine (PPSV23) Pneumococcal polysaccharide vaccine (PPSV23) protects against 23 types of pneumococcal bacteria. It will not prevent all pneumococcal disease. PPSV23 is recommended for:  All adults 72years of age and older,  Anyone 2 through 59years of age with certain long-term health problems, 
 Anyone 2 through 59years of age with a weakened immune system, 
 Adults 23 through 59years of age who smoke cigarettes or have asthma. Most people need only one dose of PPSV. A second dose is recommended for certain high-risk groups. People 72 and older should get a dose even if they have gotten one or more doses of the vaccine before they turned 65. Your healthcare provider can give you more information about these recommendations. Most healthy adults develop protection within 2 to 3 weeks of getting the shot. 3. Some people should not get this vaccine  Anyone who has had a life-threatening allergic reaction to PPSV should not get another dose.  Anyone who has a severe allergy to any component of PPSV should not receive it. Tell your provider if you have any severe allergies.  Anyone who is moderately or severely ill when the shot is scheduled may be asked to wait until they recover before getting the vaccine. Someone with a mild illness can usually be vaccinated.  Children less than 3years of age should not receive this vaccine.  There is no evidence that PPSV is harmful to either a pregnant woman or to her fetus. However, as a precaution, women who need the vaccine should be vaccinated before becoming pregnant, if possible. 4. Risks of a vaccine reaction With any medicine, including vaccines, there is a chance of side effects. These are usually mild and go away on their own, but serious reactions are also possible. About half of people who get PPSV have mild side effects, such as redness or pain where the shot is given, which go away within about two days. Less than 1 out of 100 people develop a fever, muscle aches, or more severe local reactions. Problems that could happen after any vaccine:  People sometimes faint after a medical procedure, including vaccination. Sitting or lying down for about 15 minutes can help prevent fainting, and injuries caused by a fall. Tell your doctor if you feel dizzy, or have vision changes or ringing in the ears.  Some people get severe pain in the shoulder and have difficulty moving the arm where a shot was given. This happens very rarely.  Any medication can cause a severe allergic reaction. Such reactions from a vaccine are very rare, estimated at about 1 in a million doses, and would happen within a few minutes to a few hours after the vaccination. As with any medicine, there is a very remote chance of a vaccine causing a serious injury or death. The safety of vaccines is always being monitored. For more information, visit: www.cdc.gov/vaccinesafety/  
 
5. What if there is a serious reaction? What should I look for? Look for anything that concerns you, such as signs of a severe allergic reaction, very high fever, or unusual behavior. Signs of a severe allergic reaction can include hives, swelling of the face and throat, difficulty breathing, a fast heartbeat, dizziness, and weakness. These would usually start a few minutes to a few hours after the vaccination. What should I do? If you think it is a severe allergic reaction or other emergency that cant wait, call 9-1-1 or get to the nearest hospital. Otherwise, call your doctor. Afterward, the reaction should be reported to the Vaccine Adverse Event Reporting System (VAERS). Your doctor might file this report, or you can do it yourself through the VAERS web site at www.vaers. [facility].gov, or by calling 5-501.606.5631. [employer] does not give medical advice. 6. How can I learn more?  Ask your doctor. He or she can give you the vaccine package insert or suggest other sources of information.  Call your local or state health department.  Contact the Centers for Disease Control and Prevention (CDC): 
- Call 0-851.731.6475 (1-800-CDC-INFO) or 
- Visit CDCs website at www.cdc.gov/vaccines Vaccine Information Statement PPSV  
04/24/2015 Department of Health and [employer] Centers for Disease Control and Prevention Office Use Only Learning About Cutting Calories How do calories affect your weight? Food gives your body energy. Energy from the food you eat is measured in calories. This energy keeps your heart beating, your brain active, and your muscles working. Your body needs a certain number of calories each day.  After your body uses the calories it needs, it stores extra calories as fat. To lose weight safely, you have to eat fewer calories while eating in a healthy way. How many calories do you need each day? The more active you are, the more calories you need. When you are less active, you need fewer calories. How many calories you need each day also depends on several things, including your age and whether you are male or female. Here are some general guidelines for adults: 
· Less active women and older adults need 1,600 to 2,000 calories each day. · Active women and less active men need 2,000 to 2,400 calories each day. · Active men need 2,400 to 3,000 calories each day. How can you cut calories and eat healthy meals? Whole grains, vegetables and fruits, and dried beans are good lower-calorie foods. They give you lots of nutrients and fiber. And they fill you up. Sweets, energy drinks, and soda pop are high in calories. They give you few nutrients and no fiber. Try to limit soda pop, fruit juice, and energy drinks. Drink water instead. Some fats can be part of a healthy diet. But cutting back on fats from highly processed foods like fast foods and many snack foods is a good way to lower the calories in your diet. Also, use smaller amounts of fats like butter, margarine, salad dressing, and mayonnaise. Add fresh garlic, lemon, or herbs to your meals to add flavor without adding fat. Meats and dairy products can be a big source of hidden fats. Try to choose lean or low-fat versions of these products. Fat-free cookies, candies, chips, and frozen treats can still be high in sugar and calories. Some fat-free foods have more calories than regular ones. Eat fat-free treats in moderation, as you would other foods. If your favorite foods are high in fat, salt, sugar, or calories, limit how often you eat them. Eat smaller servings, or look for healthy substitutes. Fill up on fruits, vegetables, and whole grains. Eating at home · Use meat as a side dish instead of as the main part of your meal. 
· Try main dishes that use whole wheat pasta, brown rice, dried beans, or vegetables. · Find ways to cook with little or no fat, such as broiling, steaming, or grilling. · Use cooking spray instead of oil. If you use oil, use a monounsaturated oil, such as canola or olive oil. · Trim fat from meats before you cook them. · Drain off fat after you brown the meat or while you roast it. · Chill soups and stews after you cook them. Then skim the fat off the top after it hardens. Eating out · Order foods that are broiled or poached rather than fried or breaded. · Cut back on the amount of butter or margarine that you use on bread. · Order sauces, gravies, and salad dressings on the side, and use only a little. · When you order pasta, choose tomato sauce rather than cream sauce. · Ask for salsa with your baked potato instead of sour cream, butter, cheese, or mondragon. · Order meals in a small size instead of upgrading to a large. · Share an entree, or take part of your food home to eat as another meal. 
· Share appetizers and desserts. Where can you learn more? Go to http://josé luis-nicole.info/. Enter 99 [id number] in the search box to learn more about \"Learning About Cutting Calories. \" Current as of: May 12, 2017 Content Version: 11.4 © 8090-4523 Healthwise, Incorporated. Care instructions adapted under license by [employer] (which disclaims liability or warranty for this information). If you have questions about a medical condition or this instruction, always ask your healthcare professional. [place] [address] any warranty or liability for your use of this information. Introducing [facility]! Dear Di Coelho: Thank you for requesting a [employer] account.   Our records indicate that you have previously registered for a [employer] account but its currently inactive. Please call our [employer] support line at [phone]. Additional Information If you have questions, please visit the Frequently Asked Questions section of the [employer] website at [email or web address] [email or web address] Remember, [employer] is NOT to be used for urgent needs. For medical emergencies, dial 911. Now available from your iPhone and Android! Please provide this summary of care documentation to your next provider. Your primary care clinician is listed as OSCAR Gibbons. If you have any questions after today's visit, please call [phone].

## 2017-10-28 LAB
APPEARANCE UR: CLEAR
BACTERIA #/AREA URNS HPF: NORMAL /[HPF]
BILIRUB UR QL STRIP: NEGATIVE
CASTS URNS QL MICRO: NORMAL /LPF
COLOR UR: YELLOW
EPI CELLS #/AREA URNS HPF: NORMAL /HPF
GLUCOSE UR QL: NEGATIVE
HGB UR QL STRIP: NEGATIVE
KETONES UR QL STRIP: NEGATIVE
LEUKOCYTE ESTERASE UR QL STRIP: ABNORMAL
MICRO URNS: ABNORMAL
MUCOUS THREADS URNS QL MICRO: PRESENT
NITRITE UR QL STRIP: NEGATIVE
PH UR STRIP: 6 [PH] (ref 5–7.5)
PROT UR QL STRIP: NEGATIVE
RBC #/AREA URNS HPF: NORMAL /HPF
SP GR UR: 1.02 (ref 1–1.03)
UROBILINOGEN UR STRIP-MCNC: 0.2 MG/DL (ref 0.2–1)
WBC #/AREA URNS HPF: NORMAL /HPF

## 2017-10-29 LAB — BACTERIA UR CULT: NORMAL

## 2017-12-11 DIAGNOSIS — E03.9 ACQUIRED HYPOTHYROIDISM: ICD-10-CM

## 2017-12-11 RX ORDER — LEVOTHYROXINE SODIUM 100 UG/1
TABLET ORAL
Qty: 30 TAB | Refills: 1 | Status: SHIPPED | OUTPATIENT
Start: 2017-12-11 | End: 2018-02-07 | Stop reason: SDUPTHER

## 2017-12-11 NOTE — TELEPHONE ENCOUNTER
Requested Prescriptions     Pending Prescriptions Disp Refills    levothyroxine (SYNTHROID) 100 mcg tablet [Pharmacy Med Name: LEVOTHYROXINE 100 MCG TABLET] 30 Tab 1     Sig: TAKE 1 TABLET EVERY DAY BEFORE BREAKFAST       Last Refill: 12-11-17  Last visit:10/27/2017

## 2017-12-29 ENCOUNTER — OFFICE VISIT (OUTPATIENT)
Dept: INTERNAL MEDICINE CLINIC | Age: 62
End: 2017-12-29

## 2017-12-29 VITALS
HEART RATE: 69 BPM | BODY MASS INDEX: 40.33 KG/M2 | DIASTOLIC BLOOD PRESSURE: 70 MMHG | OXYGEN SATURATION: 97 % | SYSTOLIC BLOOD PRESSURE: 122 MMHG | HEIGHT: 61 IN | WEIGHT: 213.6 LBS

## 2017-12-29 DIAGNOSIS — K11.21 ACUTE PAROTITIS: Primary | ICD-10-CM

## 2017-12-29 PROBLEM — E66.01 OBESITY, MORBID (HCC): Status: ACTIVE | Noted: 2017-12-29

## 2017-12-29 RX ORDER — AMOXICILLIN AND CLAVULANATE POTASSIUM 500; 125 MG/1; MG/1
1 TABLET, FILM COATED ORAL 2 TIMES DAILY
Qty: 14 TAB | Refills: 0 | Status: SHIPPED | OUTPATIENT
Start: 2017-12-29 | End: 2018-02-21 | Stop reason: ALTCHOICE

## 2017-12-29 NOTE — PROGRESS NOTES
Davide Montejo is a 60 y.o. female presenting for Cyst (neck)  . 1. Have you been to the ER, urgent care clinic since your last visit? Hospitalized since your last visit? No    2. Have you seen or consulted any other health care providers outside of the [address] [place] [address] since your last visit? Include any pap smears or colon screening. No    No flowsheet data found. Abuse Screening Questionnaire 9/12/2017   Do you ever feel afraid of your partner? N   Are you in a relationship with someone who physically or mentally threatens you? N   Is it safe for you to go home? Y       No flowsheet data found. There are no discontinued medications.

## 2017-12-29 NOTE — PROGRESS NOTES
This note will not be viewable in [address] Subjective:     Mrs. Av Romano presents to the office today with the acute onset of left-sided facial swelling in front of her left ear. It began yesterday and she noted that there was tenderness in this area. After she got up this morning the swelling went down but she still notes some tenderness. She has had no fevers or chills. She has had no hearing problems and she denies sore throat. Past Medical History:   Diagnosis Date    Abscess of chest wall 8/10/2017    Acute asthmatic bronchitis 8/10/2017    Breast cancer ([address] 2015    Rt mastectomy    Cancer ([address]     right breast    Cigarette nicotine dependence in remission 8/22/2017    Diabetes ([address]     Dyspepsia and other specified disorders of function of stomach     GERD (gastroesophageal reflux disease)     GERD without esophagitis 8/22/2017    Hyperglycemia 8/10/2017    Hypertension     Impacted cerumen of right ear 8/10/2017    Mood swings ([address] 8/10/2017    Nausea & vomiting     Obesity 8/10/2017    Urticaria 8/10/2017     Past Surgical History:   Procedure Laterality Date    HX APPENDECTOMY  1970    HX BREAST BIOPSY Right 1/19/2016    RIGHT BREAST DEBRIDEMENT OF MASTECTOMY SITE performed by Wilian Martin MD at [address] [place] [address]  [facility]    tonsillectomy    HX MASTECTOMY Right 12/3/2015    RIGHT BREAST SIMPLE MASTECTOMY WITH RIGHT SENTINEL NODE BIOPSY performed by Wilian Martin MD at [facility] AMBULATORY OR     Allergies   Allergen Reactions    Latex Rash    Antihistamine [Diphenhydramine Hcl] Unknown (comments)    Iodine Rash    Novocain [Procaine] Palpitations     Current Outpatient Prescriptions   Medication Sig Dispense Refill    amoxicillin-clavulanate (AUGMENTIN) 500-125 mg per tablet Take 1 Tab by mouth two (2) times a day.  14 Tab 0    levothyroxine (SYNTHROID) 100 mcg tablet TAKE 1 TABLET EVERY DAY BEFORE BREAKFAST 30 Tab 1    metFORMIN (GLUCOPHAGE) 500 mg tablet TAKE 1 (ONE) TABLET , ORAL, TWO TIMES DAILY 60 Tab 3    sertraline (ZOLOFT) 50 mg tablet TAKE 1 TABLET BY MOUTH EVERY DAY 30 Tab 3    valsartan-hydroCHLOROthiazide (DIOVAN-HCT) 80-12.5 mg per tablet TAKE 1 TABLET EVERY DAY  12    letrozole (FEMARA) 2.5 mg tablet   11    Omeprazole delayed release (PRILOSEC D/R) 20 mg tablet Take 20 mg by mouth daily. Social History     Social History    Marital status:      Spouse name: N/A    Number of children: N/A    Years of education: N/A     Social History Main Topics    Smoking status: Former Smoker     Packs/day: 1.00     Years: 30.00     Types: Cigarettes     Quit date: 12/3/2015    Smokeless tobacco: Former User     Quit date: 8/7/2016      Comment: vaped for one year after quitting smoking     Alcohol use No    Drug use: No    Sexual activity: Not Asked     Other Topics Concern    None     Social History Narrative     Family History   Problem Relation Age of Onset    Cancer Father      lung    Osteoporosis Mother     Arthritis-osteo Sister     Cancer Paternal Aunt      breast cancer, late 64's-early 75's       Review of Systems:  GEN: no weight loss, weight gain, fatigue or night sweats  CV: no PND, orthopnea, or palpitations  Resp: no dyspnea on exertion, no cough  Abd: no nausea, vomiting or diarrhea  EXT: denies edema, claudication  Endocrine: no hair loss, excessive thirst or polyuria  Neurological ROS: no TIA or stroke symptoms  ROS otherwise negative      Objective:     Visit Vitals    /70 (BP 1 Location: Left arm, BP Patient Position: Sitting)    Pulse 69    Ht 5' 1\" (1.549 m)    Wt 213 lb 9.6 oz (96.9 kg)    SpO2 97%    BMI 40.36 kg/m2     Body mass index is 40.36 kg/(m^2). General:   alert, cooperative and no distress   Eyes: conjunctivae/sclerae clear. PERRL, EOM's intact   Mouth:  No oral lesions, no pharyngeal erythema, no exudates. The left parotid gland is slightly swollen, boggy and tender.    Neck: Trachea midline, no thyromegaly, no bruits   Heart: S1 and S2 normal,no murmurs noted    Lungs: Clear to auscultation bilaterally, no increased work of breathing   Abdomen: Soft, nontender. Normal bowel sounds   Extremities: No edema or cyanosis   Neuro: ..alert, oriented x3,speech normal in context and clarity, cranial nerves II-XII intact,motor strength: full proximally and distally,gait: normal  reflexes: full and symmetric     Physical exam otherwise negative         Assessment/Plan:     Diagnoses and all orders for this visit:    Acute parotitis  -     amoxicillin-clavulanate (AUGMENTIN) 500-125 mg per tablet; Take 1 Tab by mouth two (2) times a day., Normal, Disp-14 Tab, R-0        Other instructions:   Moist heat to be applied to the left parotid gland 3-4 times daily    Start Augmentin    Sour candy to be used to help because parotid contraction    BMI is 40.36 and dietary counseling along with printed patient education is given    Follow-up if there is worsening    Follow-up Disposition:  Return if symptoms worsen or fail to improve.     Shalom Jones MD

## 2017-12-29 NOTE — MR AVS SNAPSHOT
Visit Information Date & Time Provider Department Dept. Phone Encounter #  
 12/29/2017  1:10 PM Chula Musa MD [facility] [id number] Follow-up Instructions Return if symptoms worsen or fail to improve. Your Appointments 2/21/2018 11:00 AM  
FOLLOW UP 10 with Chula Musa MD  
[address] ([address]) Appt Note: 6 mo fu  
 [address] [id number] [address] [address]  
  
    
 8/9/2018  1:30 PM  
Follow Up with PAO Robertson [address] ([address]) Appt Note: 1 year follow up / cp $35 ? ct 8-7-17  
 [address] [address]  
[address] [facility] Upcoming Health Maintenance Date Due Hepatitis C Screening 1955 FOOT EXAM Q1 8/17/1964 EYE EXAM RETINAL OR DILATED Q1 8/17/1964 DTaP/Tdap/Td series (1 - Tdap) 8/17/1975 PAP AKA CERVICAL CYTOLOGY 8/17/1975 FOBT Q 1 YEAR AGE 50-75 8/17/2004 ZOSTER VACCINE AGE 60> 6/17/2014 HEMOGLOBIN A1C Q6M 2/22/2018 MICROALBUMIN Q1 8/22/2018 LIPID PANEL Q1 8/22/2018 Pneumococcal 19-64 Highest Risk (2 of 3 - PCV13) 10/27/2018 Allergies as of 12/29/2017  Review Complete On: 12/29/2017 By: Chula Musa MD  
  
 Severity Noted Reaction Type Reactions Latex  05/23/2012    Rash Antihistamine [Diphenhydramine Hcl]  08/10/2017    Unknown (comments) Iodine  05/23/2012    Rash Novocain [Procaine]  05/23/2012    Palpitations Current Immunizations  Never Reviewed Name Date Influenza Vaccine (Quad) PF 10/27/2017, 12/4/2015 10:36 AM  
 Pneumococcal Polysaccharide (PPSV-23) 10/27/2017 Not reviewed this visit You Were Diagnosed With   
  
 Codes Comments Acute parotitis    -  Primary ICD-10-CM: K11.21 
ICD-9-CM: 527.2 Vitals BP Pulse Height(growth percentile) Weight(growth percentile) SpO2 BMI  
 122/70 (BP 1 Location: Left arm, BP Patient Position: Sitting) 69 5' 1\" (1.549 m) 213 lb 9.6 oz (96.9 kg) 97% 40.36 kg/m2 OB Status Smoking Status Postmenopausal Former Smoker Vitals History BMI and BSA Data Body Mass Index Body Surface Area  
 40.36 kg/m 2 2.04 m 2 Preferred Pharmacy Pharmacy Name Phone [phone] [phone] Your Updated Medication List  
  
   
This list is accurate as of: 12/29/17  1:32 PM.  Always use your most recent med list.  
  
  
  
  
 amoxicillin-clavulanate 500-125 mg per tablet Commonly known as:  AUGMENTIN Take 1 Tab by mouth two (2) times a day. letrozole 2.5 mg tablet Commonly known as:  FEMARA  
  
 levothyroxine 100 mcg tablet Commonly known as:  SYNTHROID  
TAKE 1 TABLET EVERY DAY BEFORE BREAKFAST  
  
 metFORMIN 500 mg tablet Commonly known as:  GLUCOPHAGE  
TAKE 1 (ONE) TABLET , ORAL, TWO TIMES DAILY Omeprazole delayed release 20 mg tablet Commonly known as:  PRILOSEC D/R Take 20 mg by mouth daily. sertraline 50 mg tablet Commonly known as:  ZOLOFT  
TAKE 1 TABLET BY MOUTH EVERY DAY  
  
 valsartan-hydroCHLOROthiazide 80-12.5 mg per tablet Commonly known as:  DIOVAN-HCT  
TAKE 1 TABLET EVERY DAY Prescriptions Sent to Pharmacy Refills  
 amoxicillin-clavulanate (AUGMENTIN) 500-125 mg per tablet 0 Sig: Take 1 Tab by mouth two (2) times a day. Class: Normal  
 Pharmacy: [address] #: [phone] Route: Oral  
  
Follow-up Instructions Return if symptoms worsen or fail to improve. Patient Instructions Parotitis: Care Instructions Your Care Instructions Parotitis is a painful swelling of your parotid glands, which are salivary glands located between the ear and jaw. The most common cause is a virus, such as mumps, herpes, or Gaetano-Barr. Bacterial infections, diabetes, tumors or stones in the saliva glands, and tooth problems also may cause parotitis. Follow-up care is a key part of your treatment and safety. Be sure to make and go to all appointments, and call your doctor if you are having problems. It's also a good idea to know your test results and keep a list of the medicines you take. How can you care for yourself at home? · Use an over-the-counter pain medicine if needed, such as acetaminophen (Tylenol), ibuprofen (Advil, Motrin), or naproxen (Aleve). Be safe with medicines. Read and follow all instructions on the label. Do not give aspirin to anyone younger than 20. It has been linked to Reye syndrome, a serious illness. · Put an ice or heat pack (whichever feels better) on the swollen jaw for 10 to 20 minutes at a time. Put a thin cloth between the ice or heat pack and the skin. · Suck on ice chips or ice treats such as Popsicles. Eat soft foods that do not have to be chewed much. Do not eat sour foods or liquids. If your salivary glands are very sore, eating these foods will usually cause them to hurt more. · If your doctor prescribed antibiotics, take them as directed. Do not stop taking them just because you feel better. You need to take the full course of antibiotics. To prevent tooth problems · Brush and floss every day, and have regular dental checkups. · Eat a healthy diet, and avoid sugary foods and drinks. · Do not smoke or use spit tobacco. Tobacco use slows your ability to heal. It also increases your risk for gum disease and cancer of the mouth and throat. If you need help quitting, talk to your doctor about stop-smoking programs and medicines.  These can increase your chances of quitting for good. When should you call for help? Call 911 anytime you think you may need emergency care. For example, call if: 
? · You have trouble breathing. ?Call your doctor now or seek immediate medical care if: 
? · You have new or worse symptoms of infection, such as: 
¨ Increased pain, swelling, warmth, or redness. ¨ Red streaks leading from the area. ¨ Pus draining from the area. ¨ A fever. ? · You have new pain, or the pain gets worse. ? Watch closely for changes in your health, and be sure to contact your doctor if: 
? · You do not feel better as expected. Where can you learn more? Go to http://josé luisDevHDnicole.info/. Enter N746 in the search box to learn more about \"Parotitis: Care Instructions. \" Current as of: May 12, 2017 Content Version: 11.4 © 9703-1586 Tagora. Care instructions adapted under license by [employer] (which disclaims liability or warranty for this information). If you have questions about a medical condition or this instruction, always ask your healthcare professional. [place] [address] any warranty or liability for your use of this information. Learning About Cutting Calories How do calories affect your weight? Food gives your body energy. Energy from the food you eat is measured in calories. This energy keeps your heart beating, your brain active, and your muscles working. Your body needs a certain number of calories each day. After your body uses the calories it needs, it stores extra calories as fat. To lose weight safely, you have to eat fewer calories while eating in a healthy way. How many calories do you need each day? The more active you are, the more calories you need. When you are less active, you need fewer calories. How many calories you need each day also depends on several things, including your age and whether you are male or female. Here are some general guidelines for adults: · Less active women and older adults need 1,600 to 2,000 calories each day. · Active women and less active men need 2,000 to 2,400 calories each day. · Active men need 2,400 to 3,000 calories each day. How can you cut calories and eat healthy meals? Whole grains, vegetables and fruits, and dried beans are good lower-calorie foods. They give you lots of nutrients and fiber. And they fill you up. Sweets, energy drinks, and soda pop are high in calories. They give you few nutrients and no fiber. Try to limit soda pop, fruit juice, and energy drinks. Drink water instead. Some fats can be part of a healthy diet. But cutting back on fats from highly processed foods like fast foods and many snack foods is a good way to lower the calories in your diet. Also, use smaller amounts of fats like butter, margarine, salad dressing, and mayonnaise. Add fresh garlic, lemon, or herbs to your meals to add flavor without adding fat. Meats and dairy products can be a big source of hidden fats. Try to choose lean or low-fat versions of these products. Fat-free cookies, candies, chips, and frozen treats can still be high in sugar and calories. Some fat-free foods have more calories than regular ones. Eat fat-free treats in moderation, as you would other foods. If your favorite foods are high in fat, salt, sugar, or calories, limit how often you eat them. Eat smaller servings, or look for healthy substitutes. Fill up on fruits, vegetables, and whole grains. Eating at home · Use meat as a side dish instead of as the main part of your meal. 
· Try main dishes that use whole wheat pasta, brown rice, dried beans, or vegetables. · Find ways to cook with little or no fat, such as broiling, steaming, or grilling. · Use cooking spray instead of oil. If you use oil, use a monounsaturated oil, such as canola or olive oil. · Trim fat from meats before you cook them. · Drain off fat after you brown the meat or while you roast it. · Chill soups and stews after you cook them. Then skim the fat off the top after it hardens. Eating out · Order foods that are broiled or poached rather than fried or breaded. · Cut back on the amount of butter or margarine that you use on bread. · Order sauces, gravies, and salad dressings on the side, and use only a little. · When you order pasta, choose tomato sauce rather than cream sauce. · Ask for salsa with your baked potato instead of sour cream, butter, cheese, or mondragon. · Order meals in a small size instead of upgrading to a large. · Share an entree, or take part of your food home to eat as another meal. 
· Share appetizers and desserts. Where can you learn more? Go to http://josé luis-nicole.info/. Enter 99 193922 in the search box to learn more about \"Learning About Cutting Calories. \" Current as of: May 12, 2017 Content Version: 11.4 © 9756-0344 RidePal. Care instructions adapted under license by [employer] (which disclaims liability or warranty for this information). If you have questions about a medical condition or this instruction, always ask your healthcare professional. [place] [address] any warranty or liability for your use of this information. Introducing [facility]! Dear Mitch Kulkarni: Thank you for requesting a Qritiqr account. Our records indicate that you have previously registered for a Qritiqr account but its currently inactive. Please call our Qritiqr support line at [phone]. Additional Information If you have questions, please visit the Frequently Asked Questions section of the Qritiqr website at [email or web address] Remember, Qritiqr is NOT to be used for urgent needs. For medical emergencies, dial 911. Now available from your iPhone and Android! Please provide this summary of care documentation to your next provider. Your primary care clinician is listed as OSCAR Gibbons. If you have any questions after today's visit, please call [phone].

## 2017-12-29 NOTE — PATIENT INSTRUCTIONS
Parotitis: Care Instructions  Your Care Instructions    Parotitis is a painful swelling of your parotid glands, which are salivary glands located between the ear and jaw. The most common cause is a virus, such as mumps, herpes, or Gaetano-Barr. Bacterial infections, diabetes, tumors or stones in the saliva glands, and tooth problems also may cause parotitis. Follow-up care is a key part of your treatment and safety. Be sure to make and go to all appointments, and call your doctor if you are having problems. It's also a good idea to know your test results and keep a list of the medicines you take. How can you care for yourself at home? · Use an over-the-counter pain medicine if needed, such as acetaminophen (Tylenol), ibuprofen (Advil, Motrin), or naproxen (Aleve). Be safe with medicines. Read and follow all instructions on the label. Do not give aspirin to anyone younger than 20. It has been linked to Reye syndrome, a serious illness. · Put an ice or heat pack (whichever feels better) on the swollen jaw for 10 to 20 minutes at a time. Put a thin cloth between the ice or heat pack and the skin. · Suck on ice chips or ice treats such as Popsicles. Eat soft foods that do not have to be chewed much. Do not eat sour foods or liquids. If your salivary glands are very sore, eating these foods will usually cause them to hurt more. · If your doctor prescribed antibiotics, take them as directed. Do not stop taking them just because you feel better. You need to take the full course of antibiotics. To prevent tooth problems  · Brush and floss every day, and have regular dental checkups. · Eat a healthy diet, and avoid sugary foods and drinks. · Do not smoke or use spit tobacco. Tobacco use slows your ability to heal. It also increases your risk for gum disease and cancer of the mouth and throat. If you need help quitting, talk to your doctor about stop-smoking programs and medicines.  These can increase your chances of quitting for good. When should you call for help? Call 911 anytime you think you may need emergency care. For example, call if:  ? · You have trouble breathing. ?Call your doctor now or seek immediate medical care if:  ? · You have new or worse symptoms of infection, such as:  ¨ Increased pain, swelling, warmth, or redness. ¨ Red streaks leading from the area. ¨ Pus draining from the area. ¨ A fever. ? · You have new pain, or the pain gets worse. ? Watch closely for changes in your health, and be sure to contact your doctor if:  ? · You do not feel better as expected. Where can you learn more? Go to http://josé luis-nicole.info/. Enter P018 in the search box to learn more about \"Parotitis: Care Instructions. \"  Current as of: May 12, 2017  Content Version: 11.4  © 5573-0941 Mail.com Media [employer]. Care instructions adapted under license by [employer] (which disclaims liability or warranty for this information). If you have questions about a medical condition or this instruction, always ask your healthcare professional. [place] [address] any warranty or liability for your use of this information. Learning About Cutting Calories  How do calories affect your weight? Food gives your body energy. Energy from the food you eat is measured in calories. This energy keeps your heart beating, your brain active, and your muscles working. Your body needs a certain number of calories each day. After your body uses the calories it needs, it stores extra calories as fat. To lose weight safely, you have to eat fewer calories while eating in a healthy way. How many calories do you need each day? The more active you are, the more calories you need. When you are less active, you need fewer calories. How many calories you need each day also depends on several things, including your age and whether you are male or female.   Here are some general guidelines for adults:  · Less active women and older adults need 1,600 to 2,000 calories each day. · Active women and less active men need 2,000 to 2,400 calories each day. · Active men need 2,400 to 3,000 calories each day. How can you cut calories and eat healthy meals? Whole grains, vegetables and fruits, and dried beans are good lower-calorie foods. They give you lots of nutrients and fiber. And they fill you up. Sweets, energy drinks, and soda pop are high in calories. They give you few nutrients and no fiber. Try to limit soda pop, fruit juice, and energy drinks. Drink water instead. Some fats can be part of a healthy diet. But cutting back on fats from highly processed foods like fast foods and many snack foods is a good way to lower the calories in your diet. Also, use smaller amounts of fats like butter, margarine, salad dressing, and mayonnaise. Add fresh garlic, lemon, or herbs to your meals to add flavor without adding fat. Meats and dairy products can be a big source of hidden fats. Try to choose lean or low-fat versions of these products. Fat-free cookies, candies, chips, and frozen treats can still be high in sugar and calories. Some fat-free foods have more calories than regular ones. Eat fat-free treats in moderation, as you would other foods. If your favorite foods are high in fat, salt, sugar, or calories, limit how often you eat them. Eat smaller servings, or look for healthy substitutes. Fill up on fruits, vegetables, and whole grains. Eating at home  · Use meat as a side dish instead of as the main part of your meal.  · Try main dishes that use whole wheat pasta, brown rice, dried beans, or vegetables. · Find ways to cook with little or no fat, such as broiling, steaming, or grilling. · Use cooking spray instead of oil. If you use oil, use a monounsaturated oil, such as canola or olive oil. · Trim fat from meats before you cook them.   · Drain off fat after you brown the meat or while you roast it. · Chill soups and stews after you cook them. Then skim the fat off the top after it hardens. Eating out  · Order foods that are broiled or poached rather than fried or breaded. · Cut back on the amount of butter or margarine that you use on bread. · Order sauces, gravies, and salad dressings on the side, and use only a little. · When you order pasta, choose tomato sauce rather than cream sauce. · Ask for salsa with your baked potato instead of sour cream, butter, cheese, or mondragon. · Order meals in a small size instead of upgrading to a large. · Share an entree, or take part of your food home to eat as another meal.  · Share appetizers and desserts. Where can you learn more? Go to http://josé luis-nicole.info/. Enter 99 [id number] in the search box to learn more about \"Learning About Cutting Calories. \"  Current as of: May 12, 2017  Content Version: 11.4  © 6996-0623 Healthwise, Incorporated. Care instructions adapted under license by Index (which disclaims liability or warranty for this information). If you have questions about a medical condition or this instruction, always ask your healthcare professional. [address] any warranty or liability for your use of this information.

## 2018-01-10 RX ORDER — SERTRALINE HYDROCHLORIDE 50 MG/1
50 TABLET, FILM COATED ORAL DAILY
Qty: 30 TAB | Refills: 3 | Status: SHIPPED | OUTPATIENT
Start: 2018-01-10 | End: 2018-05-02 | Stop reason: SDUPTHER

## 2018-01-10 RX ORDER — METFORMIN HYDROCHLORIDE 500 MG/1
500 TABLET ORAL 2 TIMES DAILY WITH MEALS
Qty: 60 TAB | Refills: 3 | Status: SHIPPED | OUTPATIENT
Start: 2018-01-10 | End: 2018-05-02 | Stop reason: SDUPTHER

## 2018-01-10 NOTE — TELEPHONE ENCOUNTER
Requested Prescriptions     Pending Prescriptions Disp Refills    sertraline (ZOLOFT) 50 mg tablet 30 Tab 3     Sig: Take 1 Tab by mouth daily.        Last Refill: 12-14-17  Last visit:12/29/2017

## 2018-01-14 RX ORDER — VALSARTAN AND HYDROCHLOROTHIAZIDE 80; 12.5 MG/1; MG/1
TABLET, FILM COATED ORAL
Qty: 30 TAB | Refills: 11 | Status: SHIPPED | OUTPATIENT
Start: 2018-01-14 | End: 2018-12-24 | Stop reason: SDUPTHER

## 2018-02-07 DIAGNOSIS — E03.9 ACQUIRED HYPOTHYROIDISM: ICD-10-CM

## 2018-02-07 RX ORDER — LEVOTHYROXINE SODIUM 100 UG/1
TABLET ORAL
Qty: 30 TAB | Refills: 1 | Status: SHIPPED | OUTPATIENT
Start: 2018-02-07 | End: 2018-04-04 | Stop reason: SDUPTHER

## 2018-02-21 ENCOUNTER — OFFICE VISIT (OUTPATIENT)
Dept: INTERNAL MEDICINE CLINIC | Age: 63
End: 2018-02-21

## 2018-02-21 VITALS
BODY MASS INDEX: 39.23 KG/M2 | HEIGHT: 61 IN | WEIGHT: 207.8 LBS | HEART RATE: 102 BPM | OXYGEN SATURATION: 97 % | DIASTOLIC BLOOD PRESSURE: 78 MMHG | SYSTOLIC BLOOD PRESSURE: 128 MMHG

## 2018-02-21 DIAGNOSIS — E03.9 ACQUIRED HYPOTHYROIDISM: ICD-10-CM

## 2018-02-21 DIAGNOSIS — E11.9 TYPE 2 DIABETES MELLITUS WITHOUT COMPLICATION, WITHOUT LONG-TERM CURRENT USE OF INSULIN (HCC): Primary | ICD-10-CM

## 2018-02-21 DIAGNOSIS — I10 ESSENTIAL HYPERTENSION: ICD-10-CM

## 2018-02-21 DIAGNOSIS — K21.9 GERD WITHOUT ESOPHAGITIS: ICD-10-CM

## 2018-02-21 NOTE — MR AVS SNAPSHOT
[address] [facility] [address] [id number] [phone] Patient: Gianna Baptiste MRN: [id number] LPA:2/89/5266 Visit Information Date & Time Provider Department Dept. Phone Encounter #  
 2/21/2018 11:00 AM Colby Stark 26 [phone] [id number] Follow-up Instructions Return in about 6 months (around 8/21/2018). Follow-up and Disposition History Your Appointments 8/9/2018  1:30 PM  
Follow [facility] with PAO [place] [facility] Appt Note: 1 year follow up / cp $35 ? ct 8-7-17  
 [address] [address]  
[address] [facility] [address] Upcoming Health Maintenance Date Due Hepatitis C Screening 1955 FOOT EXAM Q1 8/17/1964 EYE EXAM RETINAL OR DILATED Q1 8/17/1964 DTaP/Tdap/Td series (1 - Tdap) 8/17/1975 PAP AKA CERVICAL CYTOLOGY 8/17/1975 FOBT Q 1 YEAR AGE 50-75 8/17/2004 ZOSTER VACCINE AGE 60> 6/17/2014 HEMOGLOBIN A1C Q6M 2/22/2018 MICROALBUMIN Q1 8/22/2018 LIPID PANEL Q1 8/22/2018 BREAST CANCER SCRN MAMMOGRAM 10/25/2018 Pneumococcal 19-64 Highest Risk (2 of 3 - PCV13) 10/27/2018 Allergies as of 2/21/2018  Review Complete On: 2/21/2018 By: Judy Ayala MD  
  
 Severity Noted Reaction Type Reactions Latex  05/23/2012    Rash Antihistamine [Diphenhydramine Hcl]  08/10/2017    Unknown (comments) Iodine  05/23/2012    Rash Novocain [Procaine]  05/23/2012    Palpitations Current Immunizations  Never Reviewed Name Date Influenza Vaccine (Quad) PF 10/27/2017, 12/4/2015 10:36 AM  
 Pneumococcal Polysaccharide (PPSV-23) 10/27/2017 Not reviewed this visit You Were Diagnosed With   
  
 Codes Comments Type 2 diabetes mellitus without complication, without long-term current use of insulin (HCC)    -  Primary ICD-10-CM: E11.9 ICD-9-CM: 250.00 Essential hypertension     ICD-10-CM: I10 
ICD-9-CM: 401.9 Acquired hypothyroidism     ICD-10-CM: E03.9 ICD-9-CM: 244.9 GERD without esophagitis     ICD-10-CM: K21.9 ICD-9-CM: 530.81 Vitals BP Pulse Height(growth percentile) Weight(growth percentile) SpO2 BMI  
 128/78 (BP 1 Location: Left arm, BP Patient Position: Sitting) (!) 102 5' 1\" (1.549 m) 207 lb 12.8 oz (94.3 kg) 97% 39.26 kg/m2 OB Status Smoking Status Postmenopausal Former Smoker BMI and BSA Data Body Mass Index Body Surface Area  
 39.26 kg/m 2 2.01 m 2 Preferred Pharmacy Pharmacy Name Phone [id number] [address] [place] [address] [phone] Your Updated Medication List  
  
   
This list is accurate as of 2/21/18 11:34 AM.  Always use your most recent med list.  
  
  
  
  
 letrozole 2.5 mg tablet Commonly known as:  FEMARA  
  
 levothyroxine 100 mcg tablet Commonly known as:  SYNTHROID  
TAKE 1 TABLET EVERY DAY BEFORE BREAKFAST  
  
 metFORMIN 500 mg tablet Commonly known as:  GLUCOPHAGE Take 1 Tab by mouth two (2) times daily (with meals). Omeprazole delayed release 20 mg tablet Commonly known as:  PRILOSEC D/R Take 20 mg by mouth daily. sertraline 50 mg tablet Commonly known as:  ZOLOFT Take 1 Tab by mouth daily. valsartan-hydroCHLOROthiazide 80-12.5 mg per tablet Commonly known as:  DIOVAN-HCT  
TAKE 1 TABLET EVERY DAY We Performed the Following CBC WITH AUTOMATED DIFF [57146 CPT(R)] COLLECTION VENOUS BLOOD,VENIPUNCTURE [id number] CPT(R)] HEMOGLOBIN A1C WITH EAG [02484 CPT(R)] LIPID PANEL [26070 CPT(R)] METABOLIC PANEL, COMPREHENSIVE [13692 CPT(R)] MICROALBUMIN, UR, RAND [id number] CPT(R)] T4, FREE [id number] CPT(R)] TSH 3RD GENERATION [49076 CPT(R)] URINALYSIS W/ RFLX MICROSCOPIC [84250 CPT(R)] Follow-up Instructions Return in about 6 months (around 8/21/2018). Patient Instructions Learning About Diabetes Food Guidelines Your Care Instructions Meal planning is important to manage diabetes. It helps keep your blood sugar at a target level (which you set with your doctor). You don't have to eat special foods. You can eat what your family eats, including sweets once in a while. But you do have to pay attention to how often you eat and how much you eat of certain foods. You may want to work with a dietitian or a certified diabetes educator (CDE) to help you plan meals and snacks. A dietitian or CDE can also help you lose weight if that is one of your goals. What should you know about eating carbs? Managing the amount of carbohydrate (carbs) you eat is an important part of healthy meals when you have diabetes. Carbohydrate is found in many foods. · Learn which foods have carbs. And learn the amounts of carbs in different foods. ¨ Bread, cereal, pasta, and rice have about 15 grams of carbs in a serving. A serving is 1 slice of bread (1 ounce), ½ cup of cooked cereal, or 1/3 cup of cooked pasta or rice. ¨ Fruits have 15 grams of carbs in a serving. A serving is 1 small fresh fruit, such as an apple or orange; ½ of a banana; ½ cup of cooked or canned fruit; ½ cup of fruit juice; 1 cup of melon or raspberries; or 2 tablespoons of dried fruit. ¨ Milk and no-sugar-added yogurt have 15 grams of carbs in a serving. A serving is 1 cup of milk or 2/3 cup of no-sugar-added yogurt. ¨ Starchy vegetables have 15 grams of carbs in a serving. A serving is ½ cup of mashed potatoes or sweet potato; 1 cup winter squash; ½ of a small baked potato; ½ cup of cooked beans; or ½ cup cooked corn or green peas.  
· Learn how much carbs to eat each day and at each meal. A dietitian or CDE can teach you how to keep track of the amount of carbs you eat. This is called carbohydrate counting. · If you are not sure how to count carbohydrate grams, use the Plate Method to plan meals. It is a good, quick way to make sure that you have a balanced meal. It also helps you spread carbs throughout the day. ¨ Divide your plate by types of foods. Put non-starchy vegetables on half the plate, meat or other protein food on one-quarter of the plate, and a grain or starchy vegetable in the final quarter of the plate. To this you can add a small piece of fruit and 1 cup of milk or yogurt, depending on how many carbs you are supposed to eat at a meal. 
· Try to eat about the same amount of carbs at each meal. Do not \"save up\" your daily allowance of carbs to eat at one meal. 
· Proteins have very little or no carbs per serving. Examples of proteins are beef, chicken, turkey, fish, eggs, tofu, cheese, cottage cheese, and peanut butter. A serving size of meat is 3 ounces, which is about the size of a deck of cards. Examples of meat substitute serving sizes (equal to 1 ounce of meat) are 1/4 cup of cottage cheese, 1 egg, 1 tablespoon of peanut butter, and ½ cup of tofu. How can you eat out and still eat healthy? · Learn to estimate the serving sizes of foods that have carbohydrate. If you measure food at home, it will be easier to estimate the amount in a serving of restaurant food. · If the meal you order has too much carbohydrate (such as potatoes, corn, or baked beans), ask to have a low-carbohydrate food instead. Ask for a salad or green vegetables. · If you use insulin, check your blood sugar before and after eating out to help you plan how much to eat in the future. · If you eat more carbohydrate at a meal than you had planned, take a walk or do other exercise. This will help lower your blood sugar. What else should you know? · Limit saturated fat, such as the fat from meat and dairy products.  This is a healthy choice because people who have diabetes are at higher risk of heart disease. So choose lean cuts of meat and nonfat or low-fat dairy products. Use olive or canola oil instead of butter or shortening when cooking. · Don't skip meals. Your blood sugar may drop too low if you skip meals and take insulin or certain medicines for diabetes. · Check with your doctor before you drink alcohol. Alcohol can cause your blood sugar to drop too low. Alcohol can also cause a bad reaction if you take certain diabetes medicines. Follow-up care is a key part of your treatment and safety. Be sure to make and go to all appointments, and call your doctor if you are having problems. It's also a good idea to know your test results and keep a list of the medicines you take. Where can you learn more? Go to http://josé luis-nicole.info/. Enter K381 in the search box to learn more about \"Learning About Diabetes Food Guidelines. \" Current as of: March 13, 2017 Content Version: 11.4 © 9988-9195 [employer]. Care instructions adapted under license by [employer] (which disclaims liability or warranty for this information). If you have questions about a medical condition or this instruction, always ask your healthcare professional. [place] [address] any warranty or liability for your use of this information. Introducing [facility]! Dear Katharyn Primrose: Thank you for requesting a Cleverlize account. Our records indicate that you have previously registered for a Cleverlize account but its currently inactive. Please call our Cleverlize support line at [phone]. Additional Information If you have questions, please visit the Frequently Asked Questions section of the Clever[employer]ize website at [email or web address]. [employer]. G[employer]/. Remember, Cleverlize is NOT to be used for urgent needs. For medical emergencies, dial 911. Now available from your iPhone and Android! Please provide this summary of care documentation to your next provider. Your primary care clinician is listed as OSCAR Gibbons. If you have any questions after today's visit, please call [phone].

## 2018-02-21 NOTE — PATIENT INSTRUCTIONS

## 2018-02-21 NOTE — PROGRESS NOTES
Afia Covington is a 60 y.o. female presenting for Hypertension (6 mo fu)  . 1. Have you been to the ER, urgent care clinic since your last visit? Hospitalized since your last visit? No    2. Have you seen or consulted any other health care providers outside of the [address] [place] [address] since your last visit? Include any pap smears or colon screening. No    No flowsheet data found. Abuse Screening Questionnaire 9/12/2017   Do you ever feel afraid of your partner? N   Are you in a relationship with someone who physically or mentally threatens you? N   Is it safe for you to go home? Y       No flowsheet data found. There are no discontinued medications.

## 2018-02-22 LAB
ALBUMIN SERPL-MCNC: 4.5 G/DL (ref 3.6–4.8)
ALBUMIN/GLOB SERPL: 1.5 {RATIO} (ref 1.2–2.2)
ALP SERPL-CCNC: 76 IU/L (ref 39–117)
ALT SERPL-CCNC: 18 IU/L (ref 0–32)
APPEARANCE UR: CLEAR
AST SERPL-CCNC: 20 IU/L (ref 0–40)
BACTERIA #/AREA URNS HPF: NORMAL /[HPF]
BASOPHILS # BLD AUTO: 0.1 X10E3/UL (ref 0–0.2)
BASOPHILS NFR BLD AUTO: 0 %
BILIRUB SERPL-MCNC: 0.2 MG/DL (ref 0–1.2)
BILIRUB UR QL STRIP: NEGATIVE
BUN SERPL-MCNC: 22 MG/DL (ref 8–27)
BUN/CREAT SERPL: 25 (ref 12–28)
CALCIUM SERPL-MCNC: 10 MG/DL (ref 8.7–10.3)
CASTS URNS QL MICRO: NORMAL /LPF
CHLORIDE SERPL-SCNC: 97 MMOL/L (ref 96–106)
CHOLEST SERPL-MCNC: 122 MG/DL (ref 100–199)
CO2 SERPL-SCNC: 21 MMOL/L (ref 18–29)
COLOR UR: YELLOW
CREAT SERPL-MCNC: 0.89 MG/DL (ref 0.57–1)
EOSINOPHIL # BLD AUTO: 0.3 X10E3/UL (ref 0–0.4)
EOSINOPHIL NFR BLD AUTO: 3 %
EPI CELLS #/AREA URNS HPF: NORMAL /HPF
ERYTHROCYTE [DISTWIDTH] IN BLOOD BY AUTOMATED COUNT: 13.2 % (ref 12.3–15.4)
EST. AVERAGE GLUCOSE BLD GHB EST-MCNC: 131 MG/DL
GFR SERPLBLD CREATININE-BSD FMLA CKD-EPI: 69 ML/MIN/{1.73_M2}
GFR SERPLBLD CREATININE-BSD FMLA CKD-EPI: 80 ML/MIN/{1.73_M2}
GLOBULIN SER CALC-MCNC: 3 G/L (ref 1.5–4.5)
GLUCOSE SERPL-MCNC: 121 MG/DL (ref 65–99)
GLUCOSE UR QL: NEGATIVE
HBA1C MFR BLD: 6.2 % (ref 4.8–5.6)
HCT VFR BLD AUTO: 39.9 % (ref 34–46.6)
HDLC SERPL-MCNC: 40 MG/DL
HGB BLD-MCNC: 13.6 G/DL (ref 11.1–15.9)
HGB UR QL STRIP: NEGATIVE
IMM GRANULOCYTES # BLD: 0 X10E3/UL (ref 0–0.1)
IMM GRANULOCYTES NFR BLD: 0 %
KETONES UR QL STRIP: NEGATIVE
LDLC SERPL CALC-MCNC: 52 MG/DL (ref 0–99)
LEUKOCYTE ESTERASE UR QL STRIP: ABNORMAL
LYMPHOCYTES # BLD AUTO: 2.1 X10E3/UL (ref 0.7–3.1)
LYMPHOCYTES NFR BLD AUTO: 19 %
MCH RBC QN AUTO: 30.7 PG (ref 26.6–33)
MCHC RBC AUTO-ENTMCNC: 34.1 G/DL (ref 31.5–35.7)
MCV RBC AUTO: 90 FL (ref 79–97)
MICRO URNS: ABNORMAL
MICROALBUMIN UR-MCNC: 5.1 UG/ML
MONOCYTES # BLD AUTO: 1 X10E3/UL (ref 0.1–0.9)
MONOCYTES NFR BLD AUTO: 9 %
MUCOUS THREADS URNS QL MICRO: PRESENT
NEUTROPHILS # BLD AUTO: 7.8 X10E3/UL (ref 1.4–7)
NEUTROPHILS NFR BLD AUTO: 69 %
NITRITE UR QL STRIP: NEGATIVE
PH UR STRIP: 5 [PH] (ref 5–7.5)
PLATELET # BLD AUTO: 246 X10E3/UL (ref 150–379)
POTASSIUM SERPL-SCNC: 4.3 MMOL/L (ref 3.5–5.2)
PROT SERPL-MCNC: 7.5 G/DL (ref 6–8.5)
PROT UR QL STRIP: NEGATIVE
RBC # BLD AUTO: 4.43 X10E6/UL (ref 3.77–5.28)
RBC #/AREA URNS HPF: NORMAL /HPF
SODIUM SERPL-SCNC: 135 MMOL/L (ref 134–144)
SP GR UR: 1.02 (ref 1–1.03)
T4 FREE SERPL-MCNC: 1.22 NG/DL (ref 0.82–1.77)
TRIGL SERPL-MCNC: 152 MG/DL (ref 0–149)
TSH SERPL DL<=0.005 MIU/L-ACNC: 4.32 UIU/ML (ref 0.45–4.5)
UROBILINOGEN UR STRIP-MCNC: 0.2 MG/DL (ref 0.2–1)
VLDLC SERPL CALC-MCNC: 30 MG/DL (ref 5–40)
WBC # BLD AUTO: 11.2 X10E3/UL (ref 3.4–10.8)
WBC #/AREA URNS HPF: NORMAL /HPF

## 2018-04-04 DIAGNOSIS — E03.9 ACQUIRED HYPOTHYROIDISM: ICD-10-CM

## 2018-04-04 RX ORDER — LEVOTHYROXINE SODIUM 100 UG/1
TABLET ORAL
Qty: 30 TAB | Refills: 1 | Status: SHIPPED | OUTPATIENT
Start: 2018-04-04 | End: 2018-05-30 | Stop reason: SDUPTHER

## 2018-08-01 ENCOUNTER — TELEPHONE (OUTPATIENT)
Dept: INTERNAL MEDICINE CLINIC | Age: 63
End: 2018-08-01

## 2018-08-01 NOTE — TELEPHONE ENCOUNTER
LVM for patient to let her know that her Valsartan medication has been recalled. Advised patient to call back if her medication is a lot number that has been recalled, we will call in another medication.

## 2018-08-09 ENCOUNTER — OFFICE VISIT (OUTPATIENT)
Dept: SURGERY | Age: 63
End: 2018-08-09

## 2018-08-09 VITALS
HEIGHT: 61 IN | DIASTOLIC BLOOD PRESSURE: 82 MMHG | SYSTOLIC BLOOD PRESSURE: 131 MMHG | HEART RATE: 86 BPM | WEIGHT: 224 LBS | BODY MASS INDEX: 42.29 KG/M2

## 2018-08-09 DIAGNOSIS — C50.411 CARCINOMA OF UPPER-OUTER QUADRANT OF FEMALE BREAST, RIGHT (HCC): Primary | ICD-10-CM

## 2018-08-09 DIAGNOSIS — I89.0 LYMPHEDEMA OF RIGHT ARM: ICD-10-CM

## 2018-08-09 DIAGNOSIS — Z90.11 S/P RIGHT MASTECTOMY: ICD-10-CM

## 2018-08-09 RX ORDER — ERGOCALCIFEROL 1.25 MG/1
CAPSULE ORAL
Refills: 0 | COMMUNITY
Start: 2018-05-31 | End: 2020-02-24 | Stop reason: ALTCHOICE

## 2018-08-09 NOTE — PROGRESS NOTES
HISTORY OF PRESENT ILLNESS  Veronica Magaña is a 60 y.o. female. HPI ESTABLISHED patient here for follow up RIGHT breast cancer. No breast problems at this time. Continues to wear lymphedema arm sleeve. Currently taking Letrozole.      History of breast cancer-  RIGHT breast IDC - ER/[place] positive, Her2 negative. Low risk mammaprint. T2N1mi  12/3/15- RIGHT breast mastectomy, SLNB and no reconstruction  1/19/16- RIGHT breast debridement of mastectomy site  4/2016- completed radiation. Followed by Dr. Ivan Smith.    Started Letrozole.  Followed by Dr. José Luis Mackenzie.     Family history includes-  Paternal aunt diagnosed with breast cancer in late 60's-early 70's.     Recent imaging-  LEFT screening mammogram on 10/25/16- BIRADS 2    ROS    Physical Exam    ASSESSMENT and PLAN  {ASSESSMENT/PLAN:09046}

## 2018-08-09 NOTE — PROGRESS NOTES
HISTORY OF PRESENT ILLNESS  Marleny Loza is a [place] 43 y.o. female. Breast Cancer     ESTABLISHED patient here for follow up RIGHT breast cancer. No breast problems at this time. Continues to wear lymphedema arm sleeve. Currently taking Letrozole.      History of breast cancer-  RIGHT breast IDC - ER/[place] positive, Her2 negative. Low risk mammaprint. T2N1mi  12/3/15- RIGHT breast mastectomy, SLNB and no reconstruction  1/19/16- RIGHT breast debridement of mastectomy site  4/2016- completed radiation. Followed by Dr. Diane Barlow.    Started Letrozole. Followed by Dr. Charissa Elliott. Social - oldest grandchild starting school in fall 2018    OB History     Obstetric Comments    Menarche:  14. LMP: 38.  # of Children:  2. Age at Delivery of First Child:  20.   Hysterectomy/oophorectomy:  NO/NO. Breast Bx:  no.  Hx of Breast Feeding:  no. BCP:  yes. Hormone therapy:  no.               Past Surgical History:   Procedure Laterality Date    HX APPENDECTOMY  1970    HX BREAST BIOPSY Right 1/19/2016    RIGHT BREAST DEBRIDEMENT OF MASTECTOMY SITE performed by Mp Reyes MD at [address]  [facility]    tonsillectomy    HX MASTECTOMY Right 12/3/2015    RIGHT BREAST SIMPLE MASTECTOMY WITH RIGHT SENTINEL NODE BIOPSY performed by Mp Reyes MD at [facility] AMBUL[facility]ORY OR      Family history includes-  Paternal aunt diagnosed with breast cancer in late 60's-early 70's.     Recent imaging-  LEFT screening mammogram on 10/25/16- BIRADS 2    ROS    Physical Exam   Constitutional: She appears well-developed and well-nourished. Pulmonary/Chest: Left breast exhibits no inverted nipple, no mass, no nipple discharge, no skin change and no tenderness. Musculoskeletal: Normal range of motion. UE x 2  Wearing sleeve on right arm. No lymphedema noted   Lymphadenopathy:     She has no cervical adenopathy. She has no axillary adenopathy. Right: No supraclavicular adenopathy present.         Left: No supraclavicular adenopathy present. Skin: Skin is warm, dry and intact. Red, splotchy macules across right chest wall due to heat and prosthesis   Psychiatric: She has a normal mood and affect. Her speech is normal and behavior is normal.     Visit Vitals    /82    Pulse 86    Ht 5' 1\" (1.549 m)    Wt 224 lb (101.6 kg)    BMI 42.32 kg/m2     ASSESSMENT and PLAN  Encounter Diagnoses   Name Primary?  Carcinoma of upper-outer quadrant of female breast, right ([address] Yes    S/P right mastectomy     Lymphedema of right arm      Normal exam with no evidence of breast malignancy. Due for LSmammogram - will schedule. No lymphedema issues and has sleeve. RTC here in 1 year or sooner PRN. Has follow-up with Dr. Raven Buchanan in the interim. She is comfortable with this plan. All questions answered and she stated understanding.

## 2018-08-09 NOTE — PATIENT INSTRUCTIONS
Mammogram: About This Test  What is it? A mammogram is an X-ray of the breast that is used to screen for breast cancer. This test can find tumors that are too small for you or your doctor to feel. Cancer is most easily treated and cured when it is found at an early stage. Why is this test done? A mammogram is done to:  · Look for breast cancer in women who don't have symptoms. · Find breast cancer in women who have symptoms. Symptoms of breast cancer may include a lump or thickening in the breast, nipple discharge, or dimpling of the skin on one area of the breast.  · Find an area of suspicious breast tissue to remove for an exam under a microscope (biopsy). How can you prepare for the test?  · Tell your doctor if you:  ¨ Are or might be pregnant. ¨ Are breastfeeding. ¨ Have breast implants. ¨ Have previously had a breast biopsy. · On the day of the test, don't use any deodorant, perfume, powders, or ointments. What happens before the test?  · You will need to take off any jewelry that might interfere with the X-ray pictures. · You will need to take off your clothes above the waist.  · You will be given a cloth or paper gown to use during the test.  What happens during the test?  · You usually stand during a mammogram.  · One at a time, your breasts will be placed on a flat plate that contains the X-ray film. · Another plate is then pressed firmly against your breast to help flatten out the breast tissue. You may be asked to lift your arm. · For a few seconds while the X-ray picture is being taken, you will need to hold your breath. · At least two pictures are taken of each breast. One is taken from the top and one from the side. What else should you know about the test?  · The X-ray plate will feel cold when you place your breast on it. Having your breasts flattened and squeezed isn't comfortable. But it is necessary to flatten out the breast tissue to get the best pictures.   · Mammograms do not prevent breast cancer or reduce a woman's risk of developing cancer. · Most things that are found during a mammogram are not breast cancer. How long does the test take? · The test will take about 10 to 15 minutes. You may be in the clinic for up to an hour. What happens after the test?  · You will probably be able to go home right away. · You can go back to your usual activities right away. Follow-up care is a key part of your treatment and safety. Be sure to make and go to all appointments, and call your doctor if you are having problems. It's also a good idea to keep a list of the medicines you take. Ask your doctor when you can expect to have your test results. Where can you learn more? Go to http://josé luis-nicole.info/. Enter A913 in the search box to learn more about \"Mammogram: About This Test.\"  Current as of: May 12, 2017  Content Version: 11.7  © 4292-2893 [employer]. Care instructions adapted under license by LoHaria (which disclaims liability or warranty for this information). If you have questions about a medical condition or this instruction, always ask your healthcare professional. [address] any warranty or liability for your use of this information.

## 2018-08-22 ENCOUNTER — OFFICE VISIT (OUTPATIENT)
Dept: INTERNAL MEDICINE CLINIC | Age: 63
End: 2018-08-22

## 2018-08-22 VITALS
HEART RATE: 85 BPM | WEIGHT: 219.2 LBS | RESPIRATION RATE: 16 BRPM | DIASTOLIC BLOOD PRESSURE: 84 MMHG | BODY MASS INDEX: 41.39 KG/M2 | HEIGHT: 61 IN | OXYGEN SATURATION: 98 % | SYSTOLIC BLOOD PRESSURE: 124 MMHG

## 2018-08-22 DIAGNOSIS — C50.911 MALIGNANT NEOPLASM OF RIGHT FEMALE BREAST, UNSPECIFIED ESTROGEN RECEPTOR STATUS, UNSPECIFIED SITE OF BREAST (HCC): ICD-10-CM

## 2018-08-22 DIAGNOSIS — E03.9 ACQUIRED HYPOTHYROIDISM: ICD-10-CM

## 2018-08-22 DIAGNOSIS — I10 ESSENTIAL HYPERTENSION: ICD-10-CM

## 2018-08-22 DIAGNOSIS — Z11.59 NEED FOR HEPATITIS C SCREENING TEST: ICD-10-CM

## 2018-08-22 DIAGNOSIS — E11.9 TYPE 2 DIABETES MELLITUS WITHOUT COMPLICATION, WITHOUT LONG-TERM CURRENT USE OF INSULIN (HCC): Primary | ICD-10-CM

## 2018-08-22 DIAGNOSIS — E55.9 VITAMIN D DEFICIENCY: ICD-10-CM

## 2018-08-22 DIAGNOSIS — Z80.0 FAMILY HISTORY OF COLON CANCER IN MOTHER: ICD-10-CM

## 2018-08-22 DIAGNOSIS — K21.9 GERD WITHOUT ESOPHAGITIS: ICD-10-CM

## 2018-08-22 NOTE — PATIENT INSTRUCTIONS

## 2018-08-22 NOTE — PROGRESS NOTES
Chief Complaint   Patient presents with    Hypertension     6 month follow up     GERD    Diabetes     1. Have you been to the ER, urgent care clinic since your last visit? Hospitalized since your last visit? No    2. Have you seen or consulted any other health care providers outside of the [address] [place] [address] since your last visit? Include any pap smears or colon screening.  No     Fasting

## 2018-08-22 NOTE — PROGRESS NOTES
This note will not be viewable in [address] Joyce Asher is a 58 y.o. female and presents with Hypertension (6 month follow up ); GERD; and Diabetes  . Subjective:  Mrs. Em Brian returns to the office today in follow-up of multiple medical problems. The patient has type 2 diabetes mellitus for which she remains on metformin. She does not check her blood sugars. She has had no polyuria, polydipsia or blurred vision. She has not had a diabetic retinal eye exam done within the last year. She denies any burning paresthesias of her feet    She has hypertension and remains on valsartan HCT. She is tolerating this without cough, swelling, muscle cramping or dizziness. She has had no headaches, numbness, tingling or focal neurological problems. She has GERD and remains on PPI therapy. She has had no breakthrough heartburn and denies dysphasia, early satiety or unexplained weight loss. She does have anxiety and mood swings and is currently on sertraline for this. This continues to work well for her without side effect and without any breakthrough symptoms. She continues to be followed by her surgical oncologist for her breast cancer. She is currently on for marrow and is seen on a yearly basis. There is been no recurrence of symptoms. She has vitamin D deficiency and remains on supplementation weekly for this. She is tolerating this without any GI upset. The patient has never had a colonoscopy. Her mother who was in her late [de-identified] recently  of colon cancer. She has had no changes in bowel movements or blood in her stool.     Past Medical History:   Diagnosis Date    Abscess of chest wall 8/10/2017    Acquired hypothyroidism 2018    Acute asthmatic bronchitis 8/10/2017    Breast cancer ([facility] 2015    Rt mastectomy    Cancer ([facility]     right breast    Cigarette nicotine dependence in remission 2017    Diabetes ([facility]     Dyspepsia and other specified disorders of function of stomach     Family history of colon cancer in mother 8/22/2018    GERD (gastroesophageal reflux disease)     GERD without esophagitis 8/22/2017    Hyperglycemia 8/10/2017    Hypertension     Impacted cerumen of right ear 8/10/2017    Mood swings (HCC) 8/10/2017    Nausea & vomiting     Obesity 8/10/2017    Urticaria 8/10/2017    Vitamin D deficiency 8/22/2018     Past Surgical History:   Procedure Laterality Date    HX APPENDECTOMY  1970    HX BREAST BIOPSY Right 1/19/2016    RIGHT BREAST DEBRIDEMENT OF MASTECTOMY SITE performed by Jr Ragsdale MD at [address]  [facility]    tonsillectomy    HX MASTECTOMY Right 12/3/2015    RIGHT BREAST SIMPLE MASTECTOMY WITH RIGHT SENTINEL NODE BIOPSY performed by Jr Ragsdale MD at [facility] AMBULATORY OR     Allergies   Allergen Reactions    Latex Rash    Antihistamine [Diphenhydramine Hcl] Unknown (comments)    Iodine Rash    Novocain [Procaine] Palpitations     Current Outpatient Prescriptions   Medication Sig Dispense Refill    omega-3s/dha/epa/fish oil/D3 (VITAMIN-D + OMEGA-3 PO) Take 500 mg by mouth. Indications: once daily      ergocalciferol (ERGOCALCIFEROL) 50,000 unit capsule TAKE 1 EACH WEEK FOR 12 WEEKS, THEN STOP  0    COD LIVER OIL PO Take  by mouth.  levothyroxine (SYNTHROID) 100 mcg tablet TAKE 1 TABLET EVERY DAY BEFORE BREAKFAST 30 Tab 12    metFORMIN (GLUCOPHAGE) 500 mg tablet TAKE 1 TAB BY MOUTH TWO (2) TIMES DAILY (WITH MEALS). 60 Tab 12    sertraline (ZOLOFT) 50 mg tablet TAKE 1 TAB BY MOUTH DAILY. 30 Tab 12    valsartan-hydroCHLOROthiazide (DIOVAN-HCT) 80-12.5 mg per tablet TAKE 1 TABLET EVERY DAY 30 Tab 11    letrozole (FEMARA) 2.5 mg tablet   11    Omeprazole delayed release (PRILOSEC D/R) 20 mg tablet Take 20 mg by mouth daily.        Social History     Social History    Marital status:      Spouse name: N/A    Number of children: N/A    Years of education: N/A     Social History Main Topics    Smoking status: Former Smoker     Packs/day: 1.00     Years: 30.00     Types: Cigarettes     Quit date: 12/3/2015    Smokeless tobacco: Former User     Quit date: 8/7/2016      Comment: vaped for one year after quitting smoking     Alcohol use No    Drug use: No    Sexual activity: Not Asked     Other Topics Concern    None     Social History Narrative     Family History   Problem Relation Age of Onset    Cancer Father      lung    Osteoporosis Mother     Arthritis-osteo Sister     Cancer Paternal Aunt      breast cancer, late 60's-early 70's       Health Maintenance   Topic Date Due    Hepatitis C Screening  1955    FOOT EXAM Q1  08/17/1964    EYE EXAM RETINAL OR DILATED Q1  08/17/1964    DTaP/Tdap/Td series (1 - Tdap) 08/17/1975    PAP AKA CERVICAL CYTOLOGY  08/17/1975    FOBT Q 1 YEAR AGE 50-75  08/17/2004    ZOSTER VACCINE AGE 60>  06/17/2014    Influenza Age 9 to Adult  08/01/2018    HEMOGLOBIN A1C Q6M  08/21/2018    BREAST CANCER SCRN MAMMOGRAM  10/25/2018    Pneumococcal 19-64 Highest Risk (2 of 3 - PCV13) 10/27/2018    MICROALBUMIN Q1  02/21/2019    LIPID PANEL Q1  02/21/2019        Review of Systems  Constitutional: negative for fevers, chills, anorexia and weight loss  Eyes:   negative for visual disturbance and irritation  ENT:   negative for tinnitus,sore throat,nasal congestion,ear pain,hoarseness  Respiratory:  negative for cough, hemoptysis, dyspnea,wheezing  CV:   negative for chest pain, palpitations, lower extremity edema  GI:   negative for nausea, vomiting, diarrhea, abdominal pain,melena  Endo:               negative for polyuria,polydipsia,polyphagia,heat intolerance  Genitourinary: negative for frequency, dysuria and hematuria  Integumentary: negative for rash and pruritus  Hematologic:  negative for easy bruising and gum/nose bleeding  Musculoskel: negative for myalgias, arthralgias, back pain, muscle weakness, joint pain  Neurological:  negative for headaches, dizziness, vertigo, memory problems and gait   Behavl/Psych: negative for feelings of anxiety, depression, mood changes  ROS otherwise negative      Objective:  Visit Vitals    /84    Pulse 85    Resp 16    Ht 5' 1\" (1.549 m)    Wt 219 lb 3.2 oz (99.4 kg)    SpO2 98%    BMI 41.42 kg/m2     Body mass index is 41.42 kg/(m^2). Physical Exam:   General appearance - alert, well appearing, and in no distress  Mental status - alert, oriented to person, place, and time  EYE-BERLIN, EOMI,conjunctiva normal bilaterally, lids normal  ENT-ENT exam normal, no neck nodes or sinus tenderness  Nose - normal and patent, no erythema,  Or discharge   Mouth - mucous membranes moist, pharynx normal without lesions  Neck - supple, no significant adenopathy or bruit  Chest - clear to auscultation, no wheezes, rales or rhonchi. Heart - normal rate, regular rhythm, normal S1, S2, no murmurs, rubs, clicks or gallops   Abdomen - soft, nontender, nondistended, no masses or organomegaly  Lymph- no adenopathy palpable  Ext-peripheral pulses normal, no pedal edema, no clubbing or cyanosis  Skin-Warm and dry.  no hyperpigmentation, vitiligo, or suspicious lesions  Neuro -alert, oriented, normal speech, no focal findings or movement disorder noted  Diabetic foot exam:     Left Foot:   Visual Exam: normal    Pulse DP: 2+ (normal)   Filament test: normal sensation    Vibratory sensation: normal      Right Foot:   Visual Exam: normal    Pulse DP: 2+ (normal)   Filament test: normal sensation    Vibratory sensation: normal        Assessment/Plan:  Diagnoses and all orders for this visit:    Type 2 diabetes mellitus without complication, without long-term current use of insulin (HCC)  -     AMB POC URINE, MICROALBUMIN, SEMIQUANT (1 RESULT)  -     HEMOGLOBIN A1C W/O EAG    Essential hypertension  -     AMB POC COMPLETE CBC,AUTOMATED ENTER  -     COLLECTION VENOUS BLOOD,VENIPUNCTURE  -     AMB POC URINALYSIS DIP STICK AUTO W/ MICRO   - LIPID PANEL  -     METABOLIC PANEL, COMPREHENSIVE    Acquired hypothyroidism  -     T4, FREE  -     TSH 3RD GENERATION    GERD without esophagitis    Malignant neoplasm of right female breast, unspecified estrogen receptor status, unspecified site of breast ([address]    Need for hepatitis C screening test  -     HEPATITIS C AB    Vitamin D deficiency  -     VITAMIN D, 25 HYDROXY    Family history of colon cancer in mother  -     REFERRAL TO COLON AND RECTAL SURGERY        Other instructions: The patient's medications are reviewed and reconciled. No change in her current medical regimen is made. Body mass index is 41.42. Dietary counseling along with printed patient education is given    She is in need of diabetic retinal eye examination will have this scheduled within the next month or 2. Her mother recently  of colon cancer and we will refer her for colonoscopy. Consider start of once daily blood sugar checks    Await results of multiple labs    CDC recommended hepatitis C screening is ordered today    Follow-up 6 months    Follow-up Disposition:  Return in about 6 months (around 2019). I have reviewed with the patient details of the assessment and plan and all questions were answered. Relevent patient education was performed. The most recent lab findings were reviewed with the patient. An After Visit Summary was printed and given to the patient.     Armani Castaneda MD

## 2018-08-22 NOTE — MR AVS SNAPSHOT
[address] [facility] [address] [address] [phone] Patient: Brice Ramirez MRN: [id number] VHX:0/18/3307 Visit Information Date & Time Provider Department Dept. Phone Encounter #  
 8/22/2018 11:00 AM MD Colby Harper 26 [phone] [id number] Follow-up Instructions Return in about 6 months (around 2/22/2019). Your Appointments 8/8/2019  1:30 PM  
Follow Up with PAO Baptiste 22 ([facility]) Appt Note: 1 year follow up / Cp$ 35 ct 8-9-18  
 [address] [address]  
[address] Upcoming Health Maintenance Date Due Hepatitis C Screening 1955 FOOT EXAM Q1 8/17/1964 EYE EXAM RETINAL OR DILATED Q1 8/17/1964 DTaP/Tdap/Td series (1 - Tdap) 8/17/1975 PAP AKA CERVICAL CYTOLOGY 8/17/1975 FOBT Q 1 YEAR AGE 50-75 8/17/2004 ZOSTER VACCINE AGE 60> 6/17/2014 Influenza Age 5 to Adult 8/1/2018 HEMOGLOBIN A1C Q6M 8/21/2018 BREAST CANCER SCRN MAMMOGRAM 10/25/2018 Pneumococcal 19-64 Highest Risk (2 of 3 - PCV13) 10/27/2018 MICROALBUMIN Q1 2/21/2019 LIPID PANEL Q1 2/21/2019 Allergies as of 8/22/2018  Review Complete On: 8/22/2018 By: Patti Savage MD  
  
 Severity Noted Reaction Type Reactions Latex  05/23/2012    Rash Antihistamine [Diphenhydramine Hcl]  08/10/2017    Unknown (comments) Iodine  05/23/2012    Rash Novocain [Procaine]  05/23/2012    Palpitations Current Immunizations  Never Reviewed Name Date Influenza Vaccine (Quad) PF 10/27/2017, 12/4/2015 10:36 AM  
 Pneumococcal Polysaccharide (PPSV-23) 10/27/2017 Not reviewed this visit You Were Diagnosed With   
  
 Codes Comments Type 2 diabetes mellitus without complication, without long-term current use of insulin (HCC)    -  Primary ICD-10-CM: E11.9 ICD-9-CM: 250.00 Essential hypertension     ICD-10-CM: I10 
ICD-9-CM: 401.9 Acquired hypothyroidism     ICD-10-CM: E03.9 ICD-9-CM: 244.9 GERD without esophagitis     ICD-10-CM: K21.9 ICD-9-CM: 530.81 Malignant neoplasm of right female breast, unspecified estrogen receptor status, unspecified site of breast ([facility]     ICD-10-CM: C50.911 ICD-9-CM: 174.9 Need for hepatitis C screening test     ICD-10-CM: Z11.59 
ICD-9-CM: V73.89 Vitamin D deficiency     ICD-10-CM: E55.9 ICD-9-CM: 268.9 Family history of colon cancer in mother     ICD-10-CM: Z80.0 ICD-9-CM: V16.0 Vitals BP Pulse Resp Height(growth percentile) Weight(growth percentile) SpO2  
 124/84 85 16 5' 1\" (1.549 m) 219 lb 3.2 oz (99.4 kg) 98% BMI OB Status Smoking Status 41.42 kg/m2 Postmenopausal Former Smoker Vitals History BMI and BSA Data Body Mass Index Body Surface Area  
 41.42 kg/m 2 2.07 m 2 Preferred Pharmacy Pharmacy Name Phone [id number] [address] [place] [address] [phone] Your Updated Medication List  
  
   
This list is accurate as of 8/22/18 11:21 AM.  Always use your most recent med list.  
  
  
  
  
 COD LIVER OIL PO Take  by mouth.  
  
 ergocalciferol 50,000 unit capsule Commonly known as:  ERGOCALCIFEROL  
TAKE 1 EACH WEEK FOR 12 WEEKS, THEN STOP  
  
 letrozole 2.5 mg tablet Commonly known as:  FEMARA  
  
 levothyroxine 100 mcg tablet Commonly known as:  SYNTHROID  
TAKE 1 TABLET EVERY DAY BEFORE BREAKFAST  
  
 metFORMIN 500 mg tablet Commonly known as:  GLUCOPHAGE  
TAKE 1 TAB BY MOUTH TWO (2) TIMES DAILY (WITH MEALS). Omeprazole delayed release 20 mg tablet Commonly known as:  PRILOSEC D/R Take 20 mg by mouth daily. sertraline 50 mg tablet Commonly known as:  ZOLOFT  
TAKE 1 TAB BY MOUTH DAILY. valsartan-hydroCHLOROthiazide 80-12.5 mg per tablet Commonly known as:  DIOVAN-HCT  
TAKE 1 TABLET EVERY DAY  
  
 VITAMIN-D + OMEGA-3 PO Take 500 mg by mouth. Indications: once daily We Performed the Following AMB POC COMPLETE CBC,AUTOMATED ENTER [id number] CPT(R)] AMB POC URINALYSIS DIP STICK AUTO W/ MICRO  [96188 CPT(R)] AMB POC URINE, MICROALBUMIN, SEMIQUANT (1 RESULT) [12892 CPT(R)] COLLECTION VENOUS BLOOD,VENIPUNCTURE [id number] CPT(R)] HEMOGLOBIN A1C W/O EAG [75677 CPT(R)] HEPATITIS C AB [89080 CPT(R)] LIPID PANEL [04853 CPT(R)] METABOLIC PANEL, COMPREHENSIVE [10342 CPT(R)] REFERRAL TO COLON AND RECTAL SURGERY [REF17 Custom] Comments:  
 Needs colonoscopy - + FH colon cancer T4, FREE [id number] CPT(R)] TSH 3RD GENERATION [63806 CPT(R)] VITAMIN D, 25 HYDROXY [id number] CPT(R)] Follow-up Instructions Return in about 6 months (around 2/22/2019). Referral Information Referral ID Referred By Referred To  
  
 [id number] Vitaly Sandra MD   
   [facility] [address] [facility], [address] [facility] Phone: [phone] Fax: [phone] Visits Status Start Date End Date 1 New Request 8/22/18 8/22/19 If your referral has a status of pending review or denied, additional information will be sent to support the outcome of this decision. Patient Instructions Learning About Cutting Calories How do calories affect your weight? Food gives your body energy. Energy from the food you eat is measured in calories. This energy keeps your heart beating, your brain active, and your muscles working. Your body needs a certain number of calories each day. After your body uses the calories it needs, it stores extra calories as fat. To lose weight safely, you have to eat fewer calories while eating in a healthy way. How many calories do you need each day? The more active you are, the more calories you need. When you are less active, you need fewer calories. How many calories you need each day also depends on several things, including your age and whether you are male or female. Here are some general guidelines for adults: 
· Less active women and older adults need 1,600 to 2,000 calories each day. · Active women and less active men need 2,000 to 2,400 calories each day. · Active men need 2,400 to 3,000 calories each day. How can you cut calories and eat healthy meals? Whole grains, vegetables and fruits, and dried beans are good lower-calorie foods. They give you lots of nutrients and fiber. And they fill you up. Sweets, energy drinks, and soda pop are high in calories. They give you few nutrients and no fiber. Try to limit soda pop, fruit juice, and energy drinks. Drink water instead. Some fats can be part of a healthy diet. But cutting back on fats from highly processed foods like fast foods and many snack foods is a good way to lower the calories in your diet. Also, use smaller amounts of fats like butter, margarine, salad dressing, and mayonnaise. Add fresh garlic, lemon, or herbs to your meals to add flavor without adding fat. Meats and dairy products can be a big source of hidden fats. Try to choose lean or low-fat versions of these products. Fat-free cookies, candies, chips, and frozen treats can still be high in sugar and calories. Some fat-free foods have more calories than regular ones. Eat fat-free treats in moderation, as you would other foods. If your favorite foods are high in fat, salt, sugar, or calories, limit how often you eat them. Eat smaller servings, or look for healthy substitutes. Fill up on fruits, vegetables, and whole grains. Eating at home · Use meat as a side dish instead of as the main part of your meal. 
· Try main dishes that use whole wheat pasta, brown rice, dried beans, or vegetables. · Find ways to cook with little or no fat, such as broiling, steaming, or grilling. · Use cooking spray instead of oil. If you use oil, use a monounsaturated oil, such as canola or olive oil. · Trim fat from meats before you cook them. · Drain off fat after you brown the meat or while you roast it. · Chill soups and stews after you cook them. Then skim the fat off the top after it hardens. Eating out · Order foods that are broiled or poached rather than fried or breaded. · Cut back on the amount of butter or margarine that you use on bread. · Order sauces, gravies, and salad dressings on the side, and use only a little. · When you order pasta, choose tomato sauce rather than cream sauce. · Ask for salsa with your baked potato instead of sour cream, butter, cheese, or mondragon. · Order meals in a small size instead of upgrading to a large. · Share an entree, or take part of your food home to eat as another meal. 
· Share appetizers and desserts. Where can you learn more? Go to http://josé luisImagistxnicole.info/. Enter 99 [id number] in the search box to learn more about \"Learning About Cutting Calories. \" Current as of: May 12, 2017 Content Version: 11.7 © 4591-6788 [employer]. Care instructions adapted under license by [employer] (which disclaims liability or warranty for this information). If you have questions about a medical condition or this instruction, always ask your healthcare professional. [place] [address] any warranty or liability for your use of this information. Introducing [facility]! Suki Sykes introduces [employer] patient portal. Now you can access parts of your medical record, email your doctor's office, and request medication refills online. 1. In your internet browser, go to [email or web address] 2. Click on the First Time User? Click Here link in the Sign In box.  You will see the New Member Sign Up page. 3. Enter your Socialtext Access Code exactly as it appears below. You will not need to use this code after youve completed the sign-up process. If you do not sign up before the expiration date, you must request a new code. · Socialtext Access Code: [email or web address] Expires: 11/8/2018 10:36 AM 
 
4. Enter the last four digits of your Social Security Number (xxxx) and Date of Birth (mm/dd/yyyy) as indicated and click Submit. You will be taken to the next sign-up page. 5. Create a Socialtext ID. This will be your Socialtext login ID and cannot be changed, so think of one that is secure and easy to remember. 6. Create a Socialtext password. You can change your password at any time. 7. Enter your Password Reset Question and Answer. This can be used at a later time if you forget your password. 8. Enter your e-mail address. You will receive e-mail notification when new information is available in [address] 9. Click Sign Up. You can now view and download portions of your medical record. 10. Click the Download Summary menu link to download a portable copy of your medical information. If you have questions, please visit the Frequently Asked Questions section of the Socialtext website. Remember, Socialtext is NOT to be used for urgent needs. For medical emergencies, dial 911. Now available from your iPhone and Android! Please provide this summary of care documentation to your next provider. Your primary care clinician is listed as [facility] Eric [facility]. If you have any questions after today's visit, please call [phone].

## 2018-08-23 LAB
25(OH)D3+25(OH)D2 SERPL-MCNC: 66.4 NG/ML (ref 30–100)
ALBUMIN SERPL-MCNC: 4.6 G/DL (ref 3.6–4.8)
ALBUMIN/GLOB SERPL: 1.5 {RATIO} (ref 1.2–2.2)
ALP SERPL-CCNC: 68 IU/L (ref 39–117)
ALT SERPL-CCNC: 28 IU/L (ref 0–32)
APPEARANCE UR: ABNORMAL
AST SERPL-CCNC: 36 IU/L (ref 0–40)
BACTERIA #/AREA URNS HPF: ABNORMAL /[HPF]
BASOPHILS # BLD AUTO: 0.1 X10E3/UL (ref 0–0.2)
BASOPHILS NFR BLD AUTO: 1 %
BILIRUB SERPL-MCNC: 0.3 MG/DL (ref 0–1.2)
BILIRUB UR QL STRIP: NEGATIVE
BUN SERPL-MCNC: 17 MG/DL (ref 8–27)
BUN/CREAT SERPL: 20 (ref 12–28)
CALCIUM SERPL-MCNC: 9.8 MG/DL (ref 8.7–10.3)
CASTS URNS QL MICRO: ABNORMAL /LPF
CHLORIDE SERPL-SCNC: 100 MMOL/L (ref 96–106)
CHOLEST SERPL-MCNC: 142 MG/DL (ref 100–199)
CO2 SERPL-SCNC: 19 MMOL/L (ref 20–29)
COLOR UR: YELLOW
CREAT SERPL-MCNC: 0.87 MG/DL (ref 0.57–1)
EOSINOPHIL # BLD AUTO: 0.3 X10E3/UL (ref 0–0.4)
EOSINOPHIL NFR BLD AUTO: 4 %
EPI CELLS #/AREA URNS HPF: ABNORMAL /HPF
ERYTHROCYTE [DISTWIDTH] IN BLOOD BY AUTOMATED COUNT: 13.5 % (ref 12.3–15.4)
GLOBULIN SER CALC-MCNC: 3.1 G/DL (ref 1.5–4.5)
GLUCOSE SERPL-MCNC: 151 MG/DL (ref 65–99)
GLUCOSE UR QL: NEGATIVE
HBA1C MFR BLD: 7 % (ref 4.8–5.6)
HCT VFR BLD AUTO: 39.9 % (ref 34–46.6)
HCV AB S/CO SERPL IA: <0.1 S/CO RATIO (ref 0–0.9)
HDLC SERPL-MCNC: 41 MG/DL
HGB BLD-MCNC: 13.9 G/DL (ref 11.1–15.9)
HGB UR QL STRIP: NEGATIVE
IMM GRANULOCYTES # BLD: 0 X10E3/UL (ref 0–0.1)
IMM GRANULOCYTES NFR BLD: 0 %
KETONES UR QL STRIP: NEGATIVE
LDLC SERPL CALC-MCNC: 78 MG/DL (ref 0–99)
LEUKOCYTE ESTERASE UR QL STRIP: ABNORMAL
LYMPHOCYTES # BLD AUTO: 2.3 X10E3/UL (ref 0.7–3.1)
LYMPHOCYTES NFR BLD AUTO: 25 %
MCH RBC QN AUTO: 30.7 PG (ref 26.6–33)
MCHC RBC AUTO-ENTMCNC: 34.8 G/DL (ref 31.5–35.7)
MCV RBC AUTO: 88 FL (ref 79–97)
MICRO URNS: ABNORMAL
MICROALBUMIN UR-MCNC: 17.3 UG/ML
MONOCYTES # BLD AUTO: 0.7 X10E3/UL (ref 0.1–0.9)
MONOCYTES NFR BLD AUTO: 8 %
MUCOUS THREADS URNS QL MICRO: PRESENT
NEUTROPHILS # BLD AUTO: 5.6 X10E3/UL (ref 1.4–7)
NEUTROPHILS NFR BLD AUTO: 62 %
NITRITE UR QL STRIP: POSITIVE
PH UR STRIP: 5 [PH] (ref 5–7.5)
PLATELET # BLD AUTO: 245 X10E3/UL (ref 150–379)
POTASSIUM SERPL-SCNC: 4.3 MMOL/L (ref 3.5–5.2)
PROT SERPL-MCNC: 7.7 G/DL (ref 6–8.5)
PROT UR QL STRIP: NEGATIVE
RBC # BLD AUTO: 4.53 X10E6/UL (ref 3.77–5.28)
RBC #/AREA URNS HPF: ABNORMAL /HPF
SODIUM SERPL-SCNC: 140 MMOL/L (ref 134–144)
SP GR UR: 1.02 (ref 1–1.03)
T4 FREE SERPL-MCNC: 1.16 NG/DL (ref 0.82–1.77)
TRIGL SERPL-MCNC: 114 MG/DL (ref 0–149)
TSH SERPL DL<=0.005 MIU/L-ACNC: 6.94 UIU/ML (ref 0.45–4.5)
UROBILINOGEN UR STRIP-MCNC: 0.2 MG/DL (ref 0.2–1)
VLDLC SERPL CALC-MCNC: 23 MG/DL (ref 5–40)
WBC # BLD AUTO: 9 X10E3/UL (ref 3.4–10.8)
WBC #/AREA URNS HPF: ABNORMAL /HPF

## 2018-08-23 NOTE — PROGRESS NOTES
Sugar was 151 and A1c is up to 7. Continue metformin. Add Amaryl 2 mg every morning. Recheck fasting blood sugar in 2 weeks. Slightly low.   Increase levothyroxine to 112 mcg every morning and recheck TSH level in 1 month

## 2018-08-24 ENCOUNTER — TELEPHONE (OUTPATIENT)
Dept: INTERNAL MEDICINE CLINIC | Age: 63
End: 2018-08-24

## 2018-08-24 DIAGNOSIS — E03.9 ACQUIRED HYPOTHYROIDISM: ICD-10-CM

## 2018-08-24 RX ORDER — GLIMEPIRIDE 2 MG/1
2 TABLET ORAL
Qty: 30 TAB | Refills: 1 | Status: SHIPPED | OUTPATIENT
Start: 2018-08-24 | End: 2018-10-22 | Stop reason: SDUPTHER

## 2018-08-24 RX ORDER — LEVOTHYROXINE SODIUM 112 UG/1
112 TABLET ORAL
Qty: 30 TAB | Refills: 1 | Status: SHIPPED | OUTPATIENT
Start: 2018-08-24 | End: 2018-10-22 | Stop reason: SDUPTHER

## 2018-08-24 NOTE — TELEPHONE ENCOUNTER
----- Message from Rafael Preston MD sent at 8/23/2018  3:00 PM EDT -----  Sugar was 151 and A1c is up to 7. Continue metformin. Add Amaryl 2 mg every morning. Recheck fasting blood sugar in 2 weeks. Slightly low.   Increase levothyroxine to 112 mcg every morning and recheck TSH level in 1 month

## 2018-12-24 RX ORDER — VALSARTAN AND HYDROCHLOROTHIAZIDE 80; 12.5 MG/1; MG/1
TABLET, FILM COATED ORAL
Qty: 30 TAB | Refills: 11 | Status: SHIPPED | OUTPATIENT
Start: 2018-12-24 | End: 2019-07-25 | Stop reason: SDUPTHER

## 2019-01-28 NOTE — PROGRESS NOTES
This note will not be viewable in [address] Isabella Mcneal is a 60 y.o. female and presents with Hypertension (6 mo fu)  . Subjective:  Mrs. Pat Pelletier returns to our office today in follow-up of multiple medical problems. The patient is on metformin for type 2 diabetes mellitus. Patient follows a prudent diet. She does not check her blood sugars. Patient denies any GI upset related to the metformin. She has had a diabetic retinal eye exam done within the last year. She denies paresthesias of her feet. Her blood pressure is been managed on valsartan HCT. She is tolerating this without dizziness, muscle cramping or lower extremity edema. She has had no headaches, numbness, tingling or focal neurological problems. She has hypothyroidism currently on thyroid replacement. She denies heat or cold intolerance, changes in skin or hair, her weight has been stable. She continues to take the medication on an empty stomach with water first thing in the morning. She remains on sertraline for mood swings and has been doing well on a low dose of this medication. She has had no side effects related to its usage    GERD is currently managed on PPI therapy. There is been no breakthrough heartburn and she denies dysphasia, early satiety or unexplained weight loss.     Past Medical History:   Diagnosis Date    Abscess of chest wall 8/10/2017    Acquired hypothyroidism 2/21/2018    Acute asthmatic bronchitis 8/10/2017    Breast [facility] [address] [address] 2015    Rt mastectomy    Cancer ([facility] [address]     right breast    Cigarette nicotine dependence in remission 8/22/2017    Diabetes ([facility] [address]     Dyspepsia and other specified disorders of function of stomach     GERD (gastroesophageal reflux disease)     GERD without esophagitis 8/22/2017    Hyperglycemia 8/10/2017    Hypertension     Impacted cerumen of right ear 8/10/2017    Mood swings (HCC) 8/10/2017    Nausea & vomiting     Obesity 8/10/2017    Urticaria 8/10/2017 Past Surgical History:   Procedure Laterality Date    HX APPENDECTOMY  1970    HX BREAST BIOPSY Right 1/19/2016    RIGHT BREAST DEBRIDEMENT OF MASTECTOMY SITE performed by Scott Guerrero MD at [address]  [facility]    tonsillectomy    HX MASTECTOMY Right 12/3/2015    RIGHT BREAST SIMPLE MASTECTOMY WITH RIGHT SENTINEL NODE BIOPSY performed by Scott Guerrero MD at [facility] AMBULATORY OR     Allergies   Allergen Reactions    Latex Rash    Antihistamine [Diphenhydramine Hcl] Unknown (comments)    Iodine Rash    Novocain [Procaine] Palpitations     Current Outpatient Prescriptions   Medication Sig Dispense Refill    levothyroxine (SYNTHROID) 100 mcg tablet TAKE 1 TABLET EVERY DAY BEFORE BREAKFAST 30 Tab 1    valsartan-hydroCHLOROthiazide (DIOVAN-HCT) 80-12.5 mg per tablet TAKE 1 TABLET EVERY DAY 30 Tab 11    sertraline (ZOLOFT) 50 mg tablet Take 1 Tab by mouth daily. 30 Tab 3    metFORMIN (GLUCOPHAGE) 500 mg tablet Take 1 Tab by mouth two (2) times daily (with meals). 60 Tab 3    letrozole (FEMARA) 2.5 mg tablet   11    Omeprazole delayed release (PRILOSEC D/R) 20 mg tablet Take 20 mg by mouth daily.        Social History     Social History    Marital status:      Spouse name: N/A    Number of children: N/A    Years of education: N/A     Social History Main Topics    Smoking status: Former Smoker     Packs/day: 1.00     Years: 30.00     Types: Cigarettes     Quit date: 12/3/2015    Smokeless tobacco: Former User     Quit date: 8/7/2016      Comment: vaped for one year after quitting smoking     Alcohol use No    Drug use: No    Sexual activity: Not Asked     Other Topics Concern    None     Social History Narrative     Family History   Problem Relation Age of Onset    Cancer Father      lung    Osteoporosis Mother     Arthritis-osteo Sister     Cancer Paternal Aunt      breast cancer, late 64's-early 75's       Health Maintenance   Topic Date Due    Hepatitis C Screening 1955    FOOT EXAM Q1  08/17/1964    EYE EXAM RETINAL OR DILATED Q1  08/17/1964    DTaP/Tdap/Td series (1 - Tdap) 08/17/1975    PAP AKA CERVICAL CYTOLOGY  08/17/1975    FOBT Q 1 YEAR AGE 50-75  08/17/2004    ZOSTER VACCINE AGE 60>  06/17/2014    HEMOGLOBIN A1C Q6M  02/22/2018    MICROALBUMIN Q1  08/22/2018    LIPID PANEL Q1  08/22/2018    BREAST CANCER SCRN MAMMOGRAM  10/25/2018    Pneumococcal 19-64 Highest Risk (2 of 3 - PCV13) 10/27/2018    Influenza Age 9 to Adult  Completed        Review of Systems  Constitutional: negative for fevers, chills, anorexia and weight loss  Eyes:   negative for visual disturbance and irritation  ENT:   negative for tinnitus,sore throat,nasal congestion,ear pain,hoarseness  Respiratory:  negative for cough, hemoptysis, dyspnea,wheezing  CV:   negative for chest pain, palpitations, lower extremity edema  GI:   negative for nausea, vomiting, diarrhea, abdominal pain,melena  Endo:               negative for polyuria,polydipsia,polyphagia,heat intolerance  Genitourinary: negative for frequency, dysuria and hematuria  Integumentary: negative for rash and pruritus  Hematologic:  negative for easy bruising and gum/nose bleeding  Musculoskel: negative for myalgias, arthralgias, back pain, muscle weakness, joint pain  Neurological:  negative for headaches, dizziness, vertigo, memory problems and gait   Behavl/Psych: negative for feelings of anxiety, depression, mood changes  ROS otherwise negative      Objective:  Visit Vitals    /78 (BP 1 Location: Left arm, BP Patient Position: Sitting)    Pulse (!) 102    Ht 5' 1\" (1.549 m)    Wt 207 lb 12.8 oz (94.3 kg)    SpO2 97%    BMI 39.26 kg/m2     Body mass index is 39.26 kg/(m^2).     Physical Exam:   General appearance - alert, well appearing, and in no distress  Mental status - alert, oriented to person, place, and time  EYE-BERLIN, EOMI,conjunctiva normal bilaterally, lids normal  ENT-ENT exam normal, no neck nodes or sinus tenderness  Nose - normal and patent, no erythema,  Or discharge   Mouth - mucous membranes moist, pharynx normal without lesions  Neck - supple, no significant adenopathy or bruit  Chest - clear to auscultation, no wheezes, rales or rhonchi. Heart - normal rate, regular rhythm, normal S1, S2, no murmurs, rubs, clicks or gallops   Abdomen - soft, nontender, nondistended, no masses or organomegaly  Lymph- no adenopathy palpable  Ext-peripheral pulses normal, no pedal edema, no clubbing or cyanosis  Skin-Warm and dry. no hyperpigmentation, vitiligo, or suspicious lesions  Neuro -alert, oriented, normal speech, no focal findings or movement disorder noted      Assessment/Plan:  Diagnoses and all orders for this visit:    Type 2 diabetes mellitus without complication, without long-term current use of insulin (HCC)  -     AMB POC HEMOGLOBIN A1C  -     AMB POC URINE, MICROALBUMIN, SEMIQUANT (1 RESULT)  -     COLLECTION VENOUS BLOOD,VENIPUNCTURE    Essential hypertension  -     AMB POC COMPLETE CBC,AUTOMATED ENTER  -     AMB POC COMPREHENSIVE METABOLIC PANEL  -     AMB POC LIPID PROFILE  -     AMB POC URINALYSIS DIP STICK AUTO W/ MICRO     Acquired hypothyroidism  -     AMB POC TSH  -     AMB POC T4, FREE    GERD without esophagitis        Other instructions: The patient's medications are reviewed and reconciled. No change in her current medical regimen is made. Body mass index is 39 and dietary counseling and printed patient education is given. Await results of multiple labs    Follow-up in 6 months    Follow-up Disposition:  Return in about 6 months (around 8/21/2018). I have reviewed with the patient details of the assessment and plan and all questions were answered. Relevent patient education was performed. The most recent lab findings were reviewed with the patient. An After Visit Summary was printed and given to the patient.     Duane Gonzalez MD Abdomen soft, nontender, nondistended, bowel sounds present in all 4 quadrants.

## 2019-06-18 ENCOUNTER — OFFICE VISIT (OUTPATIENT)
Dept: FAMILY MEDICINE CLINIC | Age: 64
End: 2019-06-18

## 2019-06-18 VITALS
SYSTOLIC BLOOD PRESSURE: 122 MMHG | DIASTOLIC BLOOD PRESSURE: 73 MMHG | BODY MASS INDEX: 41.16 KG/M2 | TEMPERATURE: 98 F | OXYGEN SATURATION: 96 % | WEIGHT: 218 LBS | RESPIRATION RATE: 19 BRPM | HEIGHT: 61 IN | HEART RATE: 82 BPM

## 2019-06-18 DIAGNOSIS — I10 ESSENTIAL HYPERTENSION: ICD-10-CM

## 2019-06-18 DIAGNOSIS — E55.9 VITAMIN D DEFICIENCY: ICD-10-CM

## 2019-06-18 DIAGNOSIS — E11.9 TYPE 2 DIABETES MELLITUS WITHOUT COMPLICATION, WITHOUT LONG-TERM CURRENT USE OF INSULIN (HCC): Primary | ICD-10-CM

## 2019-06-18 DIAGNOSIS — Z12.31 SCREENING MAMMOGRAM FOR HIGH-RISK PATIENT: ICD-10-CM

## 2019-06-18 DIAGNOSIS — E03.9 ACQUIRED HYPOTHYROIDISM: ICD-10-CM

## 2019-06-18 RX ORDER — CHOLECALCIFEROL TAB 125 MCG (5000 UNIT) 125 MCG
TAB ORAL DAILY
COMMUNITY

## 2019-06-18 NOTE — PROGRESS NOTES
Chief Complaint Patient presents with [facility] reviewed 1. Have you been to the ER, urgent care clinic since your last visit? Hospitalized since your last visi? No 
 
2. Have you seen or consulted any other health care providers outside of the [address] [place] [address] since your last visit? Include any pap smears or colon screening.  No

## 2019-06-18 NOTE — PROGRESS NOTES
Chief Complaint [facility] today to transfer care to our office. Patient reports that she has a history of diabetes and breast cancer. Patient is overdue for her mammogram, has not followed up recently with either her breast surgeon or her oncologist.  Patient is not fasting this morning, plans to follow-up for labs. No other complaints at this time. No refills needed at this time. Subjective: (As above and below) Chief Complaint Patient presents with [address]  
 
she is a 58y.o. year old female who presents for evaluation. Reviewed PmHx, RxHx, FmHx, SocHx, AllgHx and updated in chart. Review of Systems - negative except as listed above Objective:  
 
Vitals:  
 06/18/19 1550 BP: 122/73 Pulse: 82 Resp: 19 Temp: 98 °F (36.7 °C) TempSrc: Oral  
SpO2: 96% Weight: 218 lb (98.9 kg) Height: 5' 1\" (1.549 m) Physical Examination: General appearance - alert, well appearing, and in no distress Mental status - normal mood, behavior, speech, dress, motor activity, and thought processes Eyes - pupils equal and reactive, extraocular eye movements intact Mouth - mucous membranes moist, pharynx normal without lesions Chest - clear to auscultation, no wheezes, rales or rhonchi, symmetric air entry Heart - normal rate, regular rhythm, normal S1, S2, no murmurs, rubs, clicks or gallops Musculoskeletal - no joint tenderness, deformity or swelling Extremities - peripheral pulses normal, no pedal edema, no clubbing or cyanosis Assessment/ Plan: 1. Type 2 diabetes mellitus without complication, without long-term current use of insulin (HCC) 
-Check fasting labs, continue on current medications - METABOLIC PANEL, COMPREHENSIVE; Future 
- HEMOGLOBIN A1C WITH EAG; Future 2. Essential hypertension 
-Well-controlled 
- CBC W/O DIFF; Future - LIPID PANEL; Future 3. Vitamin D deficiency Continue on current supplement pending lab results - VITAMIN D, 25 HYDROXY; Future 4. Acquired hypothyroidism 10 you on Synthroid daily 
- TSH 3RD GENERATION; Future 5. Screening mammogram for high-risk patient Mammogram ordered for routine follow-up - [facility] 3D BONI W MAMMO BI SCREENING INCL CAD; Future Up in 6 months or as needed I have discussed the diagnosis with the patient and the intended plan as seen in the above orders. The patient has received an after-visit summary and questions were answered concerning future plans. Medication Side Effects and Warnings were discussed with patient: yes Patient Labs were reviewed: yes Patient Past Records were reviewed:  yes Luis F Woodard M.D.

## 2019-06-26 ENCOUNTER — TELEPHONE (OUTPATIENT)
Dept: FAMILY MEDICINE CLINIC | Age: 64
End: 2019-06-26

## 2019-06-26 DIAGNOSIS — Z12.31 SCREENING MAMMOGRAM FOR HIGH-RISK PATIENT: Primary | ICD-10-CM

## 2019-06-26 NOTE — TELEPHONE ENCOUNTER
Rajendra Mckeon from [facility] [address] says because this patient has had breast cancer in the past few years, she needs an order for a diagnostic mammogram

## 2019-07-05 ENCOUNTER — HOSPITAL ENCOUNTER (EMERGENCY)
Age: 64
Discharge: HOME | End: 2019-07-05
Attending: EMERGENCY MEDICINE | Admitting: EMERGENCY MEDICINE
Payer: COMMERCIAL

## 2019-07-05 ENCOUNTER — APPOINTMENT (OUTPATIENT)
Dept: GENERAL RADIOLOGY | Age: 64
End: 2019-07-05
Attending: EMERGENCY MEDICINE
Payer: COMMERCIAL

## 2019-07-05 ENCOUNTER — APPOINTMENT (OUTPATIENT)
Dept: CT IMAGING | Age: 64
End: 2019-07-05
Attending: EMERGENCY MEDICINE
Payer: COMMERCIAL

## 2019-07-05 VITALS
OXYGEN SATURATION: 95 % | TEMPERATURE: 97.4 F | HEART RATE: 80 BPM | RESPIRATION RATE: 14 BRPM | WEIGHT: 221.78 LBS | SYSTOLIC BLOOD PRESSURE: 120 MMHG | BODY MASS INDEX: 41.91 KG/M2 | DIASTOLIC BLOOD PRESSURE: 72 MMHG

## 2019-07-05 DIAGNOSIS — S22.42XA CLOSED FRACTURE OF MULTIPLE RIBS OF LEFT SIDE, INITIAL ENCOUNTER: Primary | ICD-10-CM

## 2019-07-05 DIAGNOSIS — S01.81XA LACERATION OF FOREHEAD, INITIAL ENCOUNTER: ICD-10-CM

## 2019-07-05 PROCEDURE — 75810000293 HC SIMP/SUPERF WND  RPR

## 2019-07-05 PROCEDURE — 77030002916 HC SUT ETHLN J&J -A

## 2019-07-05 PROCEDURE — 70450 CT HEAD/BRAIN W/O DYE: CPT

## 2019-07-05 PROCEDURE — 99283 EMERGENCY DEPT VISIT LOW MDM: CPT

## 2019-07-05 PROCEDURE — 71101 X-RAY EXAM UNILAT RIBS/CHEST: CPT

## 2019-07-05 PROCEDURE — 74011000250 HC RX REV CODE- 250: Performed by: EMERGENCY MEDICINE

## 2019-07-05 PROCEDURE — 77030018836 HC SOL IRR NACL ICUM -A

## 2019-07-05 RX ORDER — LIDOCAINE HYDROCHLORIDE AND EPINEPHRINE 10; 10 MG/ML; UG/ML
1.5 INJECTION, SOLUTION INFILTRATION; PERINEURAL
Status: COMPLETED | OUTPATIENT
Start: 2019-07-05 | End: 2019-07-05

## 2019-07-05 RX ORDER — LIDOCAINE 50 MG/G
PATCH TOPICAL
Qty: 1 EACH | Refills: 0 | Status: SHIPPED | OUTPATIENT
Start: 2019-07-05 | End: 2020-01-01

## 2019-07-05 RX ORDER — OXYCODONE AND ACETAMINOPHEN 5; 325 MG/1; MG/1
1 TABLET ORAL
Qty: 20 TAB | Refills: 0 | Status: SHIPPED | OUTPATIENT
Start: 2019-07-05 | End: 2019-07-12

## 2019-07-05 RX ADMIN — LIDOCAINE HYDROCHLORIDE,EPINEPHRINE BITARTRATE 15 MG: 10; .01 INJECTION, SOLUTION INFILTRATION; PERINEURAL at 14:22

## 2019-07-05 NOTE — ED PROVIDER NOTES
Pt. Presents to the ER after a fall. Pt. Was walking on the steps at her son's house when she mis stepped and fell. Pt. Hortensiaa Nine her head. No LOC. Pt. Also complains of pain at her left back since the fall. Pt. Josey Do well prior to the fall.  +mild nausea. No fevers/chills. No numbness/tingling or weakness. No pain in her neck or the middle of her back. Pt. Was able to get up and walk around on her own. No other complaints. Past Medical History:  
Diagnosis Date  Abscess of chest wall 8/10/2017  Acquired hypothyroidism 2/21/2018  Acute asthmatic bronchitis 8/10/2017  Breast cancer ([facility] [address] 2015 Rt mastectomy  Cancer ([facility] [address] right breast  
 Cigarette nicotine dependence in remission 8/22/2017  Diabetes ([facility] [address]  Dyspepsia and other specified disorders of function of stomach  Family history of colon cancer in mother 8/22/2018  GERD (gastroesophageal reflux disease)  GERD without esophagitis 8/22/2017  Hyperglycemia 8/10/2017  Hypertension  Impacted cerumen of right ear 8/10/2017  Mood swings 8/10/2017  Nausea & vomiting  Obesity 8/10/2017  Urticaria 8/10/2017  Vitamin D deficiency 8/22/2018 Past Surgical History:  
Procedure Laterality Date [address] [facility] Sergey  HX BREAST BIOPSY Right 1/19/2016 RIGHT BREAST DEBRIDEMENT OF MASTECTOMY SITE performed by Esmer Morris MD at [facility]  
 tonsillectomy  HX MASTECTOMY Right 12/3/2015 RIGHT BREAST SIMPLE MASTECTOMY WITH RIGHT SENTINEL NODE BIOPSY performed by Emser Morris MD at [facility] AMBULATORY OR Family History:  
Problem Relation Age of Onset  Cancer Father   
     lung  Osteoporosis Mother  Arthritis-osteo Sister  Cancer Paternal Aunt   
     breast cancer, late 60's-early 66's Social History Socioeconomic History  Marital status:  Spouse name: Not on file  Number of children: Not on file  Years of education: Not on file  Highest education level: Not on file Occupational History  Not on file Social Needs  Financial resource strain: Not on file  Food insecurity:  
  Worry: Not on file Inability: Not on file  Transportation needs:  
  Medical: Not on file Non-medical: Not on file Tobacco Use  Smoking status: Former Smoker Packs/day: 1.00 Years: 30.00 Pack years: 30.00 Types: Cigarettes Last attempt to quit: 12/3/2015 Years since quitting: 3.5  Smokeless tobacco: Never Used  Tobacco comment: vaped for one year after quitting smoking Substance and Sexual Activity  Alcohol use: No  
  Alcohol/week: 0.0 oz  Drug use: No  
 Sexual activity: Not on file Lifestyle  Physical activity:  
  Days per week: Not on file Minutes per session: Not on file  Stress: Not on file Relationships  Social connections:  
  Talks on phone: Not on file Gets together: Not on file Attends Mandaeism service: Not on file Active member of club or organization: Not on file Attends meetings of clubs or organizations: Not on file Relationship status: Not on file  Intimate partner violence:  
  Fear of current or ex partner: Not on file Emotionally abused: Not on file Physically abused: Not on file Forced sexual activity: Not on file Other Topics Concern  Not on file Social History Narrative  Not on file ALLERGIES: Latex; Antihistamine [diphenhydramine hcl]; Iodine; and Novocain [procaine] Review of Systems Constitutional: Negative for chills and fever. HENT: Negative for rhinorrhea and sore throat. Respiratory: Negative for cough and shortness of breath. Cardiovascular: Negative for chest pain. Gastrointestinal: Negative for abdominal pain, diarrhea, nausea and vomiting. Genitourinary: Negative for dysuria and urgency. Musculoskeletal: Positive for back pain. Negative for arthralgias. Skin: Positive for wound. Neurological: Negative for dizziness, weakness and light-headedness. Vitals:  
 07/05/19 1345 BP: 143/77 Pulse: 80 Resp: 16 Temp: 97.9 °F (36.6 °C) SpO2: 98% Weight: 100.6 kg (221 lb 12.5 oz) Physical Exam  
 
 
Vital signs reviewed. Nursing notes reviewed. Const:  No acute distress, well developed, well nourished Head:  Atraumatic, normocephalic Eyes:  PERRL, conjunctiva normal, no scleral icterus Neck:  Supple, trachea midline Cardiovascular:  RRR, no murmurs, no gallops, no rubs Resp:  No resp distress, no increased work of breathing, no wheezes, no rhonchi, no rales, Abd:  Soft, non-tender, non-distended, no rebound, no guarding, no CVA tenderness MSK:  No pedal edema, normal ROM, no midline C, T, or L spine tenderness, left lateral back tenderness to palpation at around T6 Neuro:  Alert and oriented x3, no cranial nerve defect Skin:  Warm, dry, intact Psych: normal mood and affect, behavior is normal, judgement and thought content is normal 
 
 
 
MDM Number of Diagnoses or Management Options Closed fracture of multiple ribs of left side, initial encounter:  
Laceration of forehead, initial encounter:  
  
Amount and/or Complexity of Data Reviewed Clinical lab tests: ordered and reviewed Tests in the radiology section of CPT®: ordered and reviewed Review and summarize past medical records: yes Patient Progress Patient progress: stable Pt. Presents to the ER with complaints of back pain and laceration after a fall. No fx or bleed on head CT. Xray shows 2 rib fx. Pt. To f/u with her PCP or return to the ER with worsening sx. Wound Repair 
Date/Time: 7/5/2019 1:00 PM 
Performed by: attendingPre-procedure re-eval: Immediately prior to the procedure, the patient was reevaluated and found suitable for the planned procedure and any planned medications. Time out: Immediately prior to the procedure a time out was called to verify the correct patient, procedure, equipment, staff and marking as appropriate. Ngozi Diego Location details: face Wound length:2.6 - 7.5 cm Anesthesia: local infiltration and nerve block Anesthesia: 
Local Anesthetic: lidocaine 1% with epinephrine Anesthetic total: 5 mL Foreign bodies: no foreign bodies Irrigation solution: saline Debridement: none Skin closure: 6-0 nylon Number of sutures: 12 Technique: simple Approximation: close Lip approximation: vermillion border well aligned Dressing: 4x4 Patient tolerance: Patient tolerated the procedure well with no immediate complications My total time at bedside, performing this procedure was 1-15 minutes.

## 2019-07-05 NOTE — DISCHARGE INSTRUCTIONS

## 2019-07-05 NOTE — ED TRIAGE NOTES
Triage Note: Patient arrives to ER complaining of fall down a step. Pt states she was getting groceries when she felt like her leg did not move and she fell hitting her head on the asphalt. Since the fall states she has felt dizzy and has pain to her head. Denies LOC. Also complains of pain to her mid back/rib cage to the left side. Lac to the left forehead/brow and nose. Last tetanus 7 years ago.

## 2019-07-19 ENCOUNTER — OFFICE VISIT (OUTPATIENT)
Dept: FAMILY MEDICINE CLINIC | Age: 64
End: 2019-07-19

## 2019-07-19 VITALS
OXYGEN SATURATION: 96 % | BODY MASS INDEX: 40.97 KG/M2 | TEMPERATURE: 98 F | SYSTOLIC BLOOD PRESSURE: 121 MMHG | HEART RATE: 94 BPM | RESPIRATION RATE: 18 BRPM | HEIGHT: 61 IN | WEIGHT: 217 LBS | DIASTOLIC BLOOD PRESSURE: 73 MMHG

## 2019-07-19 DIAGNOSIS — S01.81XD LACERATION OF BROW WITHOUT COMPLICATION, SUBSEQUENT ENCOUNTER: ICD-10-CM

## 2019-07-19 DIAGNOSIS — S22.42XD CLOSED FRACTURE OF MULTIPLE RIBS OF LEFT SIDE WITH ROUTINE HEALING, SUBSEQUENT ENCOUNTER: Primary | ICD-10-CM

## 2019-07-19 NOTE — PROGRESS NOTES
ZACHARY Gould is a 58 y.o. female who presents for suture removal.  She fell off of her porch landing on her left side. Eyebrow laceration, broken ribs, hematoma of the left thigh. She has been doing well for the last 2 weeks since 12 sutures were placed in her left eyebrow. Presents today for suture removal    PMHx:  Past Medical History:   Diagnosis Date    Abscess of chest wall 8/10/2017    Acquired hypothyroidism 2/21/2018    Acute asthmatic bronchitis 8/10/2017    Breast cancer ([facility] [address] 2015    Rt mastectomy    Cancer ([facility] [address]     right breast    Cigarette nicotine dependence in remission 8/22/2017    Diabetes ([facility] [address]     Dyspepsia and other specified disorders of function of stomach     Family history of colon cancer in mother 8/22/2018    GERD (gastroesophageal reflux disease)     GERD without esophagitis 8/22/2017    Hyperglycemia 8/10/2017    Hypertension     Impacted cerumen of right ear 8/10/2017    Mood swings 8/10/2017    Nausea & vomiting     Obesity 8/10/2017    Urticaria 8/10/2017    Vitamin D deficiency 8/22/2018       Meds:   Current Outpatient Medications   Medication Sig Dispense Refill    lidocaine (LIDODERM) 5 % Apply patch to the affected area for 12 hours a day and remove for 12 hours a day. 1 Each 0    cholecalciferol, VITAMIN D3, (VITAMIN D3) 5,000 unit tab tablet Take  by mouth daily.  valsartan-hydroCHLOROthiazide (DIOVAN-HCT) 80-12.5 mg per tablet TAKE 1 TABLET EVERY DAY 30 Tab 11    levothyroxine (SYNTHROID) 112 mcg tablet TAKE 1 TABLET BY MOUTH EVERY DAY BEFORE BREAKFAST 30 Tab 6    glimepiride (AMARYL) 2 mg tablet TAKE 1 TABLET BY MOUTH EVERY DAY IN THE MORNING 30 Tab 6    ergocalciferol (ERGOCALCIFEROL) 50,000 unit capsule TAKE 1 EACH WEEK FOR 12 WEEKS, THEN STOP  0    metFORMIN (GLUCOPHAGE) 500 mg tablet TAKE 1 TAB BY MOUTH TWO (2) TIMES DAILY (WITH MEALS). 60 Tab 12    sertraline (ZOLOFT) 50 mg tablet TAKE 1 TAB BY MOUTH DAILY.  30 Tab 12    letrozole (FEMARA) 2.5 mg tablet   11    Omeprazole delayed release (PRILOSEC D/R) 20 mg tablet Take 20 mg by mouth daily. Allergies: Allergies   Allergen Reactions    Latex Rash    Antihistamine [Diphenhydramine Hcl] Unknown (comments)    Iodine Rash    Novocain [Procaine] Palpitations       Smoker:  Social History     Tobacco Use   Smoking Status Former Smoker    Packs/day: 1.00    Years: 30.00    Pack years: 30.00    Types: Cigarettes    Last attempt to quit: 12/3/2015    Years since quitting: 3.6   Smokeless Tobacco Never Used   Tobacco Comment    vaped for one year after quitting smoking        ETOH:   Social History     Substance and Sexual Activity   Alcohol Use No    Alcohol/week: 0.0 standard drinks       FH:   Family History   Problem Relation Age of Onset    Cancer Father         lung    Osteoporosis Mother     Arthritis-osteo Sister     Cancer Paternal Aunt         breast cancer, late 64's-early 75's       ROS:   As listed in HPI. In addition:  Constitutional:   No headache, fever, fatigue, weight loss or weight gain      Cardiac:    No chest pain      Resp:   No cough or shortness of breath      Neuro   No loss of consciousness, dizziness, seizures      Physical Exam:  Blood pressure 121/73, pulse 94, temperature 98 °F (36.7 °C), resp. rate 18, height 5' 1\" (1.549 m), weight 217 lb (98.4 kg), SpO2 96 %. GEN: No apparent distress. Alert and oriented and responds to all questions appropriately. NEUROLOGIC:  No focal neurologic deficits. Strength and sensation grossly intact. Coordination and gait grossly intact. EXT: Well perfused. No edema. SKIN: No obvious rashes. Left eyebrow examined. Laceration has healed and sutures are ready for removal       Assessment and Plan     12 sutures removed. Some were under a eschar, 2 sutures were particularly difficult to remove as the wound it travels upward from the eyebrow.   Fairly certain that the full suture was removed and these cases but warned patient that if she feels like something is poking her it it may be the suture and we may take a closer look. Educated on treatment of her fractured ribs. Has an incentive spirometry. Needs to use a little bit more often          ICD-10-CM ICD-9-CM    1. Closed fracture of multiple ribs of left side with routine healing, subsequent encounter S22.42XD V54.19    2. Laceration of brow without complication, subsequent encounter S01.81XD V58.89 SUTURE / STAPLE REMOVAL BY PROVIDER     873.42        AVS given.  Pt expressed understanding of instructions

## 2019-07-25 DIAGNOSIS — E03.9 ACQUIRED HYPOTHYROIDISM: ICD-10-CM

## 2019-07-26 RX ORDER — LEVOTHYROXINE SODIUM 112 UG/1
TABLET ORAL
Qty: 90 TAB | Refills: 3 | Status: SHIPPED | OUTPATIENT
Start: 2019-07-26 | End: 2020-01-01 | Stop reason: SDUPTHER

## 2019-07-26 RX ORDER — GLIMEPIRIDE 2 MG/1
TABLET ORAL
Qty: 90 TAB | Refills: 3 | Status: SHIPPED | OUTPATIENT
Start: 2019-07-26 | End: 2020-01-01 | Stop reason: SDUPTHER

## 2019-07-26 RX ORDER — METFORMIN HYDROCHLORIDE 500 MG/1
TABLET ORAL
Qty: 180 TAB | Refills: 3 | Status: SHIPPED | OUTPATIENT
Start: 2019-07-26 | End: 2020-01-01 | Stop reason: SDUPTHER

## 2019-07-26 RX ORDER — SERTRALINE HYDROCHLORIDE 50 MG/1
TABLET, FILM COATED ORAL
Qty: 90 TAB | Refills: 3 | Status: SHIPPED | OUTPATIENT
Start: 2019-07-26 | End: 2020-01-01 | Stop reason: SDUPTHER

## 2019-07-26 RX ORDER — VALSARTAN AND HYDROCHLOROTHIAZIDE 80; 12.5 MG/1; MG/1
TABLET, FILM COATED ORAL
Qty: 90 TAB | Refills: 3 | Status: SHIPPED | OUTPATIENT
Start: 2019-07-26 | End: 2020-01-01 | Stop reason: SDUPTHER

## 2019-10-17 ENCOUNTER — OFFICE VISIT (OUTPATIENT)
Dept: FAMILY MEDICINE CLINIC | Age: 64
End: 2019-10-17

## 2019-10-17 VITALS
RESPIRATION RATE: 16 BRPM | OXYGEN SATURATION: 95 % | SYSTOLIC BLOOD PRESSURE: 108 MMHG | TEMPERATURE: 97.9 F | HEIGHT: 61 IN | WEIGHT: 218 LBS | HEART RATE: 91 BPM | BODY MASS INDEX: 41.16 KG/M2 | DIASTOLIC BLOOD PRESSURE: 64 MMHG

## 2019-10-17 DIAGNOSIS — J20.9 ACUTE BRONCHITIS, UNSPECIFIED ORGANISM: ICD-10-CM

## 2019-10-17 DIAGNOSIS — R52 GENERALIZED BODY ACHES: Primary | ICD-10-CM

## 2019-10-17 RX ORDER — DOXYCYCLINE 100 MG/1
100 CAPSULE ORAL 2 TIMES DAILY
Qty: 20 CAP | Refills: 0 | Status: SHIPPED | OUTPATIENT
Start: 2019-10-17 | End: 2019-10-27

## 2019-10-17 NOTE — PROGRESS NOTES
Chief Complaint Patient presents with  Cough  Sore Throat  Ear Pain  
  bilateral  
 
 
Subjective:  
Roscoe Santana is a 71 y.o. female who complains of congestion and productive cough for 5 days, gradually worsening since that time. She denies a history of shortness of breath and wheezing. Evaluation to date: none. Treatment to date: OTC products. Patient does not smoke cigarettes. Relevant PMH: No pertinent additional PMH. Patient Active Problem List  
Diagnosis Code  Breast cancer, right ([facility] C50.911  S/P right mastectomy Z90.11  
 Obesity E66.9  
 Urticaria L50.9  Mood swings R45.86  Abscess of chest wall L02.213  
 Hyperglycemia R73.9  Impacted cerumen of right ear H61.21  
 Acute asthmatic bronchitis J45.909  Diabetes ([facility] E11.9  Hypertension I10  
 GERD without esophagitis K21.9  Cigarette nicotine dependence in remission F17.211  Obesity, morbid ([facility] E66.01  
 Acquired hypothyroidism E03.9  Vitamin D deficiency E55.9  Family history of colon cancer in mother Z80.0 Current Outpatient Medications Medication Sig Dispense Refill  doxycycline (MONODOX) 100 mg capsule Take 1 Cap by mouth two (2) times a day for 10 days. 20 Cap 0  
 glimepiride (AMARYL) 2 mg tablet TAKE 1 TABLET BY MOUTH EVERY DAY IN THE MORNING 90 Tab 3  
 levothyroxine (SYNTHROID) 112 mcg tablet TAKE 1 TABLET BY MOUTH EVERY DAY BEFORE BREAKFAST 90 Tab 3  
 metFORMIN (GLUCOPHAGE) 500 mg tablet TAKE 1 TAB BY MOUTH TWO (2) TIMES DAILY (WITH MEALS). 180 Tab 3  
 sertraline (ZOLOFT) 50 mg tablet TAKE 1 TAB BY MOUTH DAILY. 90 Tab 3  
 valsartan-hydroCHLOROthiazide (DIOVAN-HCT) 80-12.5 mg per tablet TAKE 1 TABLET EVERY DAY 90 Tab 3  cholecalciferol, VITAMIN D3, (VITAMIN D3) 5,000 unit tab tablet Take  by mouth daily.  letrozole (FEMARA) 2.5 mg tablet   11  
 Omeprazole delayed release (PRILOSEC D/R) 20 mg tablet Take 20 mg by mouth daily.  lidocaine (LIDODERM) 5 % Apply patch to the affected area for 12 hours a day and remove for 12 hours a day. 1 Each 0  
 ergocalciferol (ERGOCALCIFEROL) 50,000 unit capsule TAKE 1 EACH WEEK FOR 12 WEEKS, THEN STOP  0 Allergies Allergen Reactions  Latex Rash  Antihistamine [Diphenhydramine Hcl] Unknown (comments)  Iodine Rash  Novocain [Procaine] Palpitations Review of Systems Pertinent items are noted in HPI. Objective:  
 
Visit Vitals /64 (BP 1 Location: Left arm, BP Patient Position: Sitting) Pulse 91 Temp 97.9 °F (36.6 °C) (Oral) Resp 16 Ht 5' 1\" (1.549 m) Wt 218 lb (98.9 kg) SpO2 95% BMI 41.19 kg/m² General:  alert, cooperative, no distress Eyes: conjunctivae/corneas clear. PERRL, EOM's intact. Fundi benign Ears: normal TM's and external ear canals AU Sinuses: Normal paranasal sinuses without tenderness Mouth:  Lips, mucosa, and tongue normal. Teeth and gums normal  
Neck: supple, symmetrical, trachea midline and no adenopathy. Heart: S1 and S2 normal, no murmurs noted. Lungs: wheezes R base, L base Assessment/Plan:  
bronchitis Suggested symptomatic OTC remedies. Antibiotics per orders. RTC prn. ICD-10-CM ICD-9-CM 1. Generalized body aches R52 780.96 AMB POC KENYON INFLUENZA A/B TEST 2. Acute bronchitis, unspecified organism J20.9 466.0 doxycycline (MONODOX) 100 mg capsule Encounter Diagnoses Name Primary?  Generalized body aches Yes  Acute bronchitis, unspecified organism Orders Placed This Encounter  AMB POC KENYON INFLUENZA A/B TEST  doxycycline (MONODOX) 100 mg capsule Diane Hay

## 2019-10-17 NOTE — PROGRESS NOTES
Chief Complaint Patient presents with  Cough  Sore Throat  Ear Pain  
  bilateral  
 
1. Have you been to the ER, urgent care clinic since your last visit? Hospitalized since your last visit? No 
 
2. Have you seen or consulted any other health care providers outside of the [address] [place] [address] since your last visit? Include any pap smears or colon screening. No 
 
Health maintenance reviewed. Pt informed of health maintenance past due and/or upcoming. Pt verbalized understanding. Fall Risk Assessment, last 12 mths 10/17/2019 Able to walk? Yes Fall in past 12 months? Yes Fall with injury? Yes  
Number of falls in past 12 months 1 Fall Risk Score 2

## 2019-10-18 LAB
FLUAV+FLUBV AG NOSE QL IA.RAPID: NEGATIVE POS/NEG
FLUAV+FLUBV AG NOSE QL IA.RAPID: NEGATIVE POS/NEG
VALID INTERNAL CONTROL?: YES

## 2020-01-01 ENCOUNTER — HOSPITAL ENCOUNTER (EMERGENCY)
Age: 65
Discharge: HOME | End: 2020-09-12
Attending: STUDENT IN AN ORGANIZED HEALTH CARE EDUCATION/TRAINING PROGRAM
Payer: MEDICARE

## 2020-01-01 ENCOUNTER — HOSPITAL ENCOUNTER (OUTPATIENT)
Dept: NON INVASIVE DIAGNOSTICS | Age: 65
Discharge: HOME | End: 2020-10-29
Attending: INTERNAL MEDICINE
Payer: MEDICARE

## 2020-01-01 ENCOUNTER — HOSPITAL ENCOUNTER (OUTPATIENT)
Dept: PREADMISSION TESTING | Age: 65
Discharge: HOME | End: 2020-10-25
Payer: MEDICARE

## 2020-01-01 ENCOUNTER — APPOINTMENT (OUTPATIENT)
Dept: GENERAL RADIOLOGY | Age: 65
End: 2020-01-01
Attending: EMERGENCY MEDICINE
Payer: MEDICARE

## 2020-01-01 ENCOUNTER — HOSPITAL ENCOUNTER (OUTPATIENT)
Dept: NON INVASIVE DIAGNOSTICS | Age: 65
Discharge: HOME | End: 2020-09-15
Attending: INTERNAL MEDICINE
Payer: MEDICARE

## 2020-01-01 VITALS
BODY MASS INDEX: 41.54 KG/M2 | WEIGHT: 220 LBS | RESPIRATION RATE: 22 BRPM | OXYGEN SATURATION: 97 % | SYSTOLIC BLOOD PRESSURE: 93 MMHG | DIASTOLIC BLOOD PRESSURE: 52 MMHG | HEART RATE: 59 BPM | HEIGHT: 61 IN

## 2020-01-01 VITALS
TEMPERATURE: 97.8 F | WEIGHT: 219.14 LBS | SYSTOLIC BLOOD PRESSURE: 100 MMHG | BODY MASS INDEX: 37.41 KG/M2 | RESPIRATION RATE: 23 BRPM | OXYGEN SATURATION: 96 % | HEART RATE: 103 BPM | HEIGHT: 64 IN | DIASTOLIC BLOOD PRESSURE: 74 MMHG

## 2020-01-01 VITALS
WEIGHT: 225 LBS | RESPIRATION RATE: 14 BRPM | SYSTOLIC BLOOD PRESSURE: 107 MMHG | HEIGHT: 61 IN | OXYGEN SATURATION: 96 % | HEART RATE: 60 BPM | BODY MASS INDEX: 42.48 KG/M2 | DIASTOLIC BLOOD PRESSURE: 65 MMHG

## 2020-01-01 DIAGNOSIS — I48.91 ATRIAL FIBRILLATION, UNSPECIFIED TYPE (HCC): ICD-10-CM

## 2020-01-01 DIAGNOSIS — E03.9 ACQUIRED HYPOTHYROIDISM: ICD-10-CM

## 2020-01-01 DIAGNOSIS — I48.91 ATRIAL FIBRILLATION, UNSPECIFIED TYPE (HCC): Primary | ICD-10-CM

## 2020-01-01 LAB
ALBUMIN SERPL-MCNC: 3.8 G/DL (ref 3.5–5)
ALBUMIN/GLOB SERPL: 0.9 {RATIO} (ref 1.1–2.2)
ALP SERPL-CCNC: 61 U/L (ref 45–117)
ALT SERPL-CCNC: 44 U/L (ref 12–78)
ANION GAP SERPL CALC-SCNC: 5 MMOL/L (ref 5–15)
AST SERPL-CCNC: 37 U/L (ref 15–37)
ATRIAL RATE: 100 BPM
ATRIAL RATE: 55 BPM
ATRIAL RATE: 56 BPM
BASOPHILS # BLD: 0.1 K/UL (ref 0–0.1)
BASOPHILS NFR BLD: 1 % (ref 0–1)
BILIRUB SERPL-MCNC: 0.7 MG/DL (ref 0.2–1)
BNP SERPL-MCNC: 1446 PG/ML
BUN SERPL-MCNC: 19 MG/DL (ref 6–20)
BUN/CREAT SERPL: 18 (ref 12–20)
CALCIUM SERPL-MCNC: 9.6 MG/DL (ref 8.5–10.1)
CALCULATED P AXIS, ECG09: 61 DEGREES
CALCULATED P AXIS, ECG09: 84 DEGREES
CALCULATED R AXIS, ECG10: 53 DEGREES
CALCULATED R AXIS, ECG10: 59 DEGREES
CALCULATED R AXIS, ECG10: 60 DEGREES
CALCULATED R AXIS, ECG10: 72 DEGREES
CALCULATED T AXIS, ECG11: -38 DEGREES
CALCULATED T AXIS, ECG11: -5 DEGREES
CALCULATED T AXIS, ECG11: 13 DEGREES
CALCULATED T AXIS, ECG11: 15 DEGREES
CHLORIDE SERPL-SCNC: 104 MMOL/L (ref 97–108)
CK SERPL-CCNC: 72 U/L (ref 26–192)
CO2 SERPL-SCNC: 29 MMOL/L (ref 21–32)
CREAT SERPL-MCNC: 1.05 MG/DL (ref 0.55–1.02)
DIAGNOSIS, 93000: NORMAL
DIFFERENTIAL METHOD BLD: ABNORMAL
EOSINOPHIL # BLD: 0.2 K/UL (ref 0–0.4)
EOSINOPHIL NFR BLD: 2 % (ref 0–7)
ERYTHROCYTE [DISTWIDTH] IN BLOOD BY AUTOMATED COUNT: 13.7 % (ref 11.5–14.5)
GLOBULIN SER CALC-MCNC: 4.2 G/DL (ref 2–4)
GLUCOSE SERPL-MCNC: 121 MG/DL (ref 65–100)
HCT VFR BLD AUTO: 44.8 % (ref 35–47)
HEALTH STATUS, XMCV2T: NORMAL
HGB BLD-MCNC: 14.4 G/DL (ref 11.5–16)
IMM GRANULOCYTES # BLD AUTO: 0.1 K/UL (ref 0–0.04)
IMM GRANULOCYTES NFR BLD AUTO: 1 % (ref 0–0.5)
LYMPHOCYTES # BLD: 1.8 K/UL (ref 0.8–3.5)
LYMPHOCYTES NFR BLD: 18 % (ref 12–49)
MCH RBC QN AUTO: 29.9 PG (ref 26–34)
MCHC RBC AUTO-ENTMCNC: 32.1 G/DL (ref 30–36.5)
MCV RBC AUTO: 92.9 FL (ref 80–99)
MONOCYTES # BLD: 0.8 K/UL (ref 0–1)
MONOCYTES NFR BLD: 8 % (ref 5–13)
NEUTS SEG # BLD: 6.8 K/UL (ref 1.8–8)
NEUTS SEG NFR BLD: 70 % (ref 32–75)
NRBC # BLD: 0 K/UL (ref 0–0.01)
NRBC BLD-RTO: 0 PER 100 WBC
P-R INTERVAL, ECG05: 154 MS
P-R INTERVAL, ECG05: 190 MS
PLATELET # BLD AUTO: 240 K/UL (ref 150–400)
PMV BLD AUTO: 10.6 FL (ref 8.9–12.9)
POTASSIUM SERPL-SCNC: 4 MMOL/L (ref 3.5–5.1)
PROT SERPL-MCNC: 8 G/DL (ref 6.4–8.2)
Q-T INTERVAL, ECG07: 326 MS
Q-T INTERVAL, ECG07: 406 MS
Q-T INTERVAL, ECG07: 444 MS
Q-T INTERVAL, ECG07: 486 MS
QRS DURATION, ECG06: 108 MS
QRS DURATION, ECG06: 112 MS
QRS DURATION, ECG06: 80 MS
QRS DURATION, ECG06: 82 MS
QTC CALCULATION (BEZET), ECG08: 428 MS
QTC CALCULATION (BEZET), ECG08: 462 MS
QTC CALCULATION (BEZET), ECG08: 464 MS
QTC CALCULATION (BEZET), ECG08: 474 MS
RBC # BLD AUTO: 4.82 M/UL (ref 3.8–5.2)
SARS-COV-2, COV2NT: NOT DETECTED
SODIUM SERPL-SCNC: 138 MMOL/L (ref 136–145)
SOURCE, COVRS: NORMAL
SPECIMEN SOURCE, FCOV2M: NORMAL
SPECIMEN TYPE, XMCV1T: NORMAL
TROPONIN I SERPL-MCNC: <0.05 NG/ML
VENTRICULAR RATE, ECG03: 121 BPM
VENTRICULAR RATE, ECG03: 55 BPM
VENTRICULAR RATE, ECG03: 56 BPM
VENTRICULAR RATE, ECG03: 82 BPM
WBC # BLD AUTO: 9.6 K/UL (ref 3.6–11)

## 2020-01-01 PROCEDURE — 77030018729 HC ELECTRD DEFIB PAD CARD -B

## 2020-01-01 PROCEDURE — 36415 COLL VENOUS BLD VENIPUNCTURE: CPT

## 2020-01-01 PROCEDURE — 96374 THER/PROPH/DIAG INJ IV PUSH: CPT

## 2020-01-01 PROCEDURE — 93005 ELECTROCARDIOGRAM TRACING: CPT

## 2020-01-01 PROCEDURE — 74011250636 HC RX REV CODE- 250/636: Performed by: INTERNAL MEDICINE

## 2020-01-01 PROCEDURE — 99152 MOD SED SAME PHYS/QHP 5/>YRS: CPT

## 2020-01-01 PROCEDURE — 74011250637 HC RX REV CODE- 250/637: Performed by: STUDENT IN AN ORGANIZED HEALTH CARE EDUCATION/TRAINING PROGRAM

## 2020-01-01 PROCEDURE — 85025 COMPLETE CBC W/AUTO DIFF WBC: CPT

## 2020-01-01 PROCEDURE — 92960 CARDIOVERSION ELECTRIC EXT: CPT

## 2020-01-01 PROCEDURE — 82550 ASSAY OF CK (CPK): CPT

## 2020-01-01 PROCEDURE — 74011000250 HC RX REV CODE- 250: Performed by: STUDENT IN AN ORGANIZED HEALTH CARE EDUCATION/TRAINING PROGRAM

## 2020-01-01 PROCEDURE — 83880 ASSAY OF NATRIURETIC PEPTIDE: CPT

## 2020-01-01 PROCEDURE — 87635 SARS-COV-2 COVID-19 AMP PRB: CPT

## 2020-01-01 PROCEDURE — 99285 EMERGENCY DEPT VISIT HI MDM: CPT

## 2020-01-01 PROCEDURE — 80053 COMPREHEN METABOLIC PANEL: CPT

## 2020-01-01 PROCEDURE — 71046 X-RAY EXAM CHEST 2 VIEWS: CPT

## 2020-01-01 PROCEDURE — 93325 DOPPLER ECHO COLOR FLOW MAPG: CPT

## 2020-01-01 PROCEDURE — 74011000250 HC RX REV CODE- 250: Performed by: INTERNAL MEDICINE

## 2020-01-01 PROCEDURE — 74011250637 HC RX REV CODE- 250/637: Performed by: INTERNAL MEDICINE

## 2020-01-01 PROCEDURE — 84484 ASSAY OF TROPONIN QUANT: CPT

## 2020-01-01 RX ORDER — LEVOTHYROXINE SODIUM 112 UG/1
TABLET ORAL
Qty: 90 TAB | Refills: 1 | Status: SHIPPED | OUTPATIENT
Start: 2020-01-01 | End: 2021-01-01 | Stop reason: SDUPTHER

## 2020-01-01 RX ORDER — SERTRALINE HYDROCHLORIDE 50 MG/1
TABLET, FILM COATED ORAL
Qty: 90 TAB | Refills: 1 | Status: SHIPPED | OUTPATIENT
Start: 2020-01-01 | End: 2021-01-01

## 2020-01-01 RX ORDER — METOPROLOL TARTRATE 25 MG/1
25 TABLET, FILM COATED ORAL 2 TIMES DAILY
Qty: 60 TAB | Refills: 11 | Status: SHIPPED
Start: 2020-01-01 | End: 2020-01-01 | Stop reason: SDUPTHER

## 2020-01-01 RX ORDER — MIDAZOLAM HYDROCHLORIDE 1 MG/ML
.5-1 INJECTION, SOLUTION INTRAMUSCULAR; INTRAVENOUS
Status: DISCONTINUED | OUTPATIENT
Start: 2020-01-01 | End: 2020-01-01

## 2020-01-01 RX ORDER — VALSARTAN AND HYDROCHLOROTHIAZIDE 80; 12.5 MG/1; MG/1
TABLET, FILM COATED ORAL
Qty: 90 TAB | Refills: 1 | Status: SHIPPED | OUTPATIENT
Start: 2020-01-01 | End: 2021-01-01

## 2020-01-01 RX ORDER — METOPROLOL TARTRATE 5 MG/5ML
5 INJECTION INTRAVENOUS
Status: COMPLETED | OUTPATIENT
Start: 2020-01-01 | End: 2020-01-01

## 2020-01-01 RX ORDER — METOPROLOL TARTRATE 50 MG/1
50 TABLET ORAL
Status: COMPLETED | OUTPATIENT
Start: 2020-01-01 | End: 2020-01-01

## 2020-01-01 RX ORDER — GLIMEPIRIDE 2 MG/1
TABLET ORAL
Qty: 90 TAB | Refills: 1 | Status: SHIPPED | OUTPATIENT
Start: 2020-01-01 | End: 2021-01-01

## 2020-01-01 RX ORDER — FENTANYL CITRATE 50 UG/ML
25-50 INJECTION, SOLUTION INTRAMUSCULAR; INTRAVENOUS
Status: DISCONTINUED | OUTPATIENT
Start: 2020-01-01 | End: 2020-01-01

## 2020-01-01 RX ORDER — FLECAINIDE ACETATE 100 MG/1
50 TABLET ORAL
Status: DISCONTINUED | OUTPATIENT
Start: 2020-01-01 | End: 2020-01-01 | Stop reason: HOSPADM

## 2020-01-01 RX ORDER — METFORMIN HYDROCHLORIDE 500 MG/1
TABLET ORAL
Qty: 180 TAB | Refills: 1 | Status: SHIPPED | OUTPATIENT
Start: 2020-01-01 | End: 2021-01-01 | Stop reason: SDUPTHER

## 2020-01-01 RX ORDER — LIDOCAINE HYDROCHLORIDE 20 MG/ML
15 SOLUTION OROPHARYNGEAL ONCE
Status: COMPLETED | OUTPATIENT
Start: 2020-01-01 | End: 2020-01-01

## 2020-01-01 RX ORDER — METOPROLOL TARTRATE 25 MG/1
12.5 TABLET, FILM COATED ORAL 2 TIMES DAILY
Qty: 30 TAB | Refills: 11 | Status: SHIPPED | OUTPATIENT
Start: 2020-01-01

## 2020-01-01 RX ORDER — FLECAINIDE ACETATE 50 MG/1
50 TABLET ORAL
Qty: 60 TAB | Refills: 11 | Status: ON HOLD
Start: 2020-01-01 | End: 2021-01-01 | Stop reason: SINTOL

## 2020-01-01 RX ORDER — METOPROLOL TARTRATE 50 MG/1
50 TABLET ORAL 2 TIMES DAILY
COMMUNITY
End: 2020-01-01 | Stop reason: SDUPTHER

## 2020-01-01 RX ORDER — MIDAZOLAM HYDROCHLORIDE 1 MG/ML
.5-2 INJECTION, SOLUTION INTRAMUSCULAR; INTRAVENOUS
Status: DISCONTINUED | OUTPATIENT
Start: 2020-01-01 | End: 2020-01-01

## 2020-01-01 RX ADMIN — FLECAINIDE ACETATE 50 MG: 100 TABLET ORAL at 14:51

## 2020-01-01 RX ADMIN — MIDAZOLAM 1 MG: 1 INJECTION INTRAMUSCULAR; INTRAVENOUS at 13:00

## 2020-01-01 RX ADMIN — MIDAZOLAM 2 MG: 1 INJECTION INTRAMUSCULAR; INTRAVENOUS at 12:53

## 2020-01-01 RX ADMIN — BENZOCAINE, BUTAMBEN, AND TETRACAINE HYDROCHLORIDE 1 SPRAY: .028; .004; .004 AEROSOL, SPRAY TOPICAL at 12:48

## 2020-01-01 RX ADMIN — FENTANYL CITRATE 25 MCG: 50 INJECTION, SOLUTION INTRAMUSCULAR; INTRAVENOUS at 13:01

## 2020-01-01 RX ADMIN — MIDAZOLAM 1 MG: 1 INJECTION INTRAMUSCULAR; INTRAVENOUS at 12:50

## 2020-01-01 RX ADMIN — SODIUM CHLORIDE 500 ML: 900 INJECTION, SOLUTION INTRAVENOUS at 13:28

## 2020-01-01 RX ADMIN — MIDAZOLAM 1 MG: 1 INJECTION INTRAMUSCULAR; INTRAVENOUS at 13:04

## 2020-01-01 RX ADMIN — FENTANYL CITRATE 50 MCG: 50 INJECTION, SOLUTION INTRAMUSCULAR; INTRAVENOUS at 12:55

## 2020-01-01 RX ADMIN — MIDAZOLAM 2 MG: 1 INJECTION INTRAMUSCULAR; INTRAVENOUS at 12:58

## 2020-01-01 RX ADMIN — FENTANYL CITRATE 25 MCG: 50 INJECTION, SOLUTION INTRAMUSCULAR; INTRAVENOUS at 12:51

## 2020-01-01 RX ADMIN — METOPROLOL TARTRATE 5 MG: 5 INJECTION INTRAVENOUS at 15:25

## 2020-01-01 RX ADMIN — MIDAZOLAM 1 MG: 1 INJECTION INTRAMUSCULAR; INTRAVENOUS at 13:01

## 2020-01-01 RX ADMIN — MIDAZOLAM 1 MG: 1 INJECTION INTRAMUSCULAR; INTRAVENOUS at 12:57

## 2020-01-01 RX ADMIN — LIDOCAINE HYDROCHLORIDE 15 ML: 20 SOLUTION ORAL; TOPICAL at 12:47

## 2020-01-01 RX ADMIN — METOPROLOL TARTRATE 50 MG: 50 TABLET, FILM COATED ORAL at 16:26

## 2020-02-24 ENCOUNTER — OFFICE VISIT (OUTPATIENT)
Dept: FAMILY MEDICINE CLINIC | Age: 65
End: 2020-02-24

## 2020-02-24 VITALS
HEART RATE: 92 BPM | BODY MASS INDEX: 41.72 KG/M2 | WEIGHT: 221 LBS | HEIGHT: 61 IN | RESPIRATION RATE: 14 BRPM | SYSTOLIC BLOOD PRESSURE: 110 MMHG | DIASTOLIC BLOOD PRESSURE: 73 MMHG | OXYGEN SATURATION: 98 % | TEMPERATURE: 98.1 F

## 2020-02-24 DIAGNOSIS — J41.1 BRONCHITIS, MUCOPURULENT RECURRENT (HCC): ICD-10-CM

## 2020-02-24 DIAGNOSIS — I10 ESSENTIAL HYPERTENSION: ICD-10-CM

## 2020-02-24 DIAGNOSIS — R06.02 SOB (SHORTNESS OF BREATH): ICD-10-CM

## 2020-02-24 DIAGNOSIS — E11.9 TYPE 2 DIABETES MELLITUS WITHOUT COMPLICATION, WITHOUT LONG-TERM CURRENT USE OF INSULIN (HCC): Primary | ICD-10-CM

## 2020-02-24 DIAGNOSIS — E03.9 ACQUIRED HYPOTHYROIDISM: ICD-10-CM

## 2020-02-24 DIAGNOSIS — E55.9 VITAMIN D DEFICIENCY: ICD-10-CM

## 2020-02-24 RX ORDER — AMOXICILLIN AND CLAVULANATE POTASSIUM 875; 125 MG/1; MG/1
1 TABLET, FILM COATED ORAL EVERY 12 HOURS
Qty: 20 TAB | Refills: 0 | Status: SHIPPED | OUTPATIENT
Start: 2020-02-24 | End: 2020-03-05

## 2020-02-24 RX ORDER — ALBUTEROL SULFATE 90 UG/1
2 AEROSOL, METERED RESPIRATORY (INHALATION)
Qty: 1 INHALER | Refills: 5 | Status: SHIPPED | OUTPATIENT
Start: 2020-02-24 | End: 2021-01-01

## 2020-02-24 NOTE — PROGRESS NOTES
Chief Complaint Patient presents with  Cough  
  possible Pt reports that she had a colonoscopy scheduled, needed to cancel, has never rescheduled. Pt reports that she has been coughing for 2-3 weeks, taking coricidin HBP with minimal relief. Patient reports that she has chronic shortness of breath, has noticed that it is getting worse recently, especially with activity. Subjective: (As above and below) Chief Complaint Patient presents with  Cough  
  possible  
 
she is a 71y.o. year old female who presents for evaluation. Reviewed PmHx, RxHx, FmHx, SocHx, AllgHx and updated in chart. Review of Systems - negative except as listed above Objective:  
 
Vitals:  
 02/24/20 1121 BP: 110/73 Pulse: 92 Resp: 14 Temp: 98.1 °F (36.7 °C) TempSrc: Oral  
SpO2: 98% Weight: 221 lb (100.2 kg) Height: 5' 1\" (1.549 m) Physical Examination: General appearance - alert, well appearing, and in no distress Mental status - normal mood, behavior, speech, dress, motor activity, and thought processes Ears - bilateral TM's and external ear canals normal 
Nose - mucosal congestion and sinus tenderness noted bilateral maxillary area Mouth - mucous membranes moist, pharynx normal without lesions Chest -dry hacking cough, mild wheezes in upper airways, good air movement Heart - normal rate, regular rhythm, normal S1, S2, no murmurs, rubs, clicks or gallops Musculoskeletal - no joint tenderness, deformity or swelling Extremities - peripheral pulses normal, no pedal edema, no clubbing or cyanosis Assessment/ Plan: 1. Type 2 diabetes mellitus without complication, without long-term current use of insulin ([address] Check labs and urine - METABOLIC PANEL, COMPREHENSIVE; Future - LIPID PANEL; Future 
- HEMOGLOBIN A1C WITH EAG; Future - AMB POC URINE, MICROALBUMIN, SEMIQUANT (3 RESULTS); Future 2. Essential hypertension Well-controlled today 
- CBC WITH AUTOMATED DIFF; Future 3. Acquired hypothyroidism Continue on current medication 
- TSH 3RD GENERATION; Future 4. Vitamin D deficiency 
- VITAMIN D, 25 HYDROXY; Future 5. SOB (shortness of breath) -Refer to pulmonary disease due to increasing shortness breath and 30-pack-year history 
- REFERRAL TO PULMONARY DISEASE 6. Bronchitis, mucopurulent recurrent ([facility] Treat as written 
- albuterol (PROVENTIL HFA, VENTOLIN HFA, PROAIR HFA) 90 mcg/actuation inhaler; Take 2 Puffs by inhalation every four (4) hours as needed for Wheezing for up to 360 days. Dispense: 1 Inhaler; Refill: 5 
- amoxicillin-clavulanate (AUGMENTIN) 875-125 mg per tablet; Take 1 Tab by mouth every twelve (12) hours for 10 days. Dispense: 20 Tab; Refill: 0 
- REFERRAL TO PULMONARY DISEASE Follow-up as needed I have discussed the diagnosis with the patient and the intended plan as seen in the above orders. The patient has received an after-visit summary and questions were answered concerning future plans. Medication Side Effects and Warnings were discussed with patient: yes Patient Labs were reviewed: yes Patient Past Records were reviewed:  yes Rachid Smith M.D.

## 2020-02-24 NOTE — PROGRESS NOTES
Chief Complaint Patient presents with  Cough  
  possible Health Maintenance Due Topic Date Due  Eye Exam Retinal or Dilated  08/17/1964  Colonoscopy  08/17/1972  Shingrix Vaccine Age 50> (1 of 2) 08/17/2004  Breast Cancer Screen Mammogram  10/25/2017  GLAUCOMA SCREENING Q2Y  08/17/2019  Bone Densitometry (Dexa) Screening  08/17/2019  Foot Exam Q1  08/22/2019  A1C test (Diabetic or Prediabetic)  08/22/2019  MICROALBUMIN Q1  08/22/2019  Lipid Screen  08/22/2019  Medicare Yearly Exam  11/26/2019 Diabetic foot exam performed by Alessia Mancilla Measurement  Response Nurse Comment Physician Comment Monofilament  R - normal sensation with micro filament L - normal sensation with micro filament Pulse DP R - present L - present Pulse TP R - present L - present Structural deformity R - None L - None Skin Integrity / Deformity R - None L - None Reviewed by:

## 2020-02-25 ENCOUNTER — HOSPITAL ENCOUNTER (OUTPATIENT)
Dept: LAB | Age: 65
Discharge: HOME | End: 2020-02-25
Payer: MEDICARE

## 2020-02-25 ENCOUNTER — LAB ONLY (OUTPATIENT)
Dept: FAMILY MEDICINE CLINIC | Age: 65
End: 2020-02-25

## 2020-02-25 DIAGNOSIS — E03.9 ACQUIRED HYPOTHYROIDISM: ICD-10-CM

## 2020-02-25 DIAGNOSIS — E55.9 VITAMIN D DEFICIENCY: ICD-10-CM

## 2020-02-25 DIAGNOSIS — E11.9 TYPE 2 DIABETES MELLITUS WITHOUT COMPLICATION, WITHOUT LONG-TERM CURRENT USE OF INSULIN (HCC): ICD-10-CM

## 2020-02-25 DIAGNOSIS — I10 ESSENTIAL HYPERTENSION: ICD-10-CM

## 2020-02-25 PROCEDURE — 82306 VITAMIN D 25 HYDROXY: CPT

## 2020-02-25 PROCEDURE — 85025 COMPLETE CBC W/AUTO DIFF WBC: CPT

## 2020-02-25 PROCEDURE — 83036 HEMOGLOBIN GLYCOSYLATED A1C: CPT

## 2020-02-25 PROCEDURE — 36415 COLL VENOUS BLD VENIPUNCTURE: CPT

## 2020-02-25 PROCEDURE — 80061 LIPID PANEL: CPT

## 2020-02-25 PROCEDURE — 84443 ASSAY THYROID STIM HORMONE: CPT

## 2020-02-25 PROCEDURE — 80053 COMPREHEN METABOLIC PANEL: CPT

## 2020-02-26 LAB
25(OH)D3+25(OH)D2 SERPL-MCNC: 54.7 NG/ML (ref 30–100)
ALBUMIN SERPL-MCNC: 3.9 G/DL (ref 3.8–4.8)
ALBUMIN/GLOB SERPL: 1.4 {RATIO} (ref 1.2–2.2)
ALP SERPL-CCNC: 69 IU/L (ref 39–117)
ALT SERPL-CCNC: 16 IU/L (ref 0–32)
AST SERPL-CCNC: 19 IU/L (ref 0–40)
BASOPHILS # BLD AUTO: 0.1 X10E3/UL (ref 0–0.2)
BASOPHILS NFR BLD AUTO: 1 %
BILIRUB SERPL-MCNC: 0.2 MG/DL (ref 0–1.2)
BUN SERPL-MCNC: 13 MG/DL (ref 8–27)
BUN/CREAT SERPL: 15 (ref 12–28)
CALCIUM SERPL-MCNC: 9.2 MG/DL (ref 8.7–10.3)
CHLORIDE SERPL-SCNC: 101 MMOL/L (ref 96–106)
CHOLEST SERPL-MCNC: 110 MG/DL (ref 100–199)
CO2 SERPL-SCNC: 22 MMOL/L (ref 20–29)
CREAT SERPL-MCNC: 0.86 MG/DL (ref 0.57–1)
EOSINOPHIL # BLD AUTO: 0.2 X10E3/UL (ref 0–0.4)
EOSINOPHIL NFR BLD AUTO: 2 %
ERYTHROCYTE [DISTWIDTH] IN BLOOD BY AUTOMATED COUNT: 12.7 % (ref 11.7–15.4)
EST. AVERAGE GLUCOSE BLD GHB EST-MCNC: 146 MG/DL
GLOBULIN SER CALC-MCNC: 2.8 G/DL (ref 1.5–4.5)
GLUCOSE SERPL-MCNC: 160 MG/DL (ref 65–99)
HBA1C MFR BLD: 6.7 % (ref 4.8–5.6)
HCT VFR BLD AUTO: 39 % (ref 34–46.6)
HDLC SERPL-MCNC: 34 MG/DL
HGB BLD-MCNC: 13.5 G/DL (ref 11.1–15.9)
IMM GRANULOCYTES # BLD AUTO: 0 X10E3/UL (ref 0–0.1)
IMM GRANULOCYTES NFR BLD AUTO: 0 %
INTERPRETATION, 910389: NORMAL
LDLC SERPL CALC-MCNC: 57 MG/DL (ref 0–99)
LYMPHOCYTES # BLD AUTO: 1.9 X10E3/UL (ref 0.7–3.1)
LYMPHOCYTES NFR BLD AUTO: 23 %
Lab: NORMAL
MCH RBC QN AUTO: 31.1 PG (ref 26.6–33)
MCHC RBC AUTO-ENTMCNC: 34.6 G/DL (ref 31.5–35.7)
MCV RBC AUTO: 90 FL (ref 79–97)
MONOCYTES # BLD AUTO: 0.8 X10E3/UL (ref 0.1–0.9)
MONOCYTES NFR BLD AUTO: 10 %
NEUTROPHILS # BLD AUTO: 5.2 X10E3/UL (ref 1.4–7)
NEUTROPHILS NFR BLD AUTO: 64 %
PLATELET # BLD AUTO: 243 X10E3/UL (ref 150–450)
POTASSIUM SERPL-SCNC: 4 MMOL/L (ref 3.5–5.2)
PROT SERPL-MCNC: 6.7 G/DL (ref 6–8.5)
RBC # BLD AUTO: 4.34 X10E6/UL (ref 3.77–5.28)
SODIUM SERPL-SCNC: 139 MMOL/L (ref 134–144)
TRIGL SERPL-MCNC: 97 MG/DL (ref 0–149)
TSH SERPL DL<=0.005 MIU/L-ACNC: 6.06 UIU/ML (ref 0.45–4.5)
VLDLC SERPL CALC-MCNC: 19 MG/DL (ref 5–40)
WBC # BLD AUTO: 8.1 X10E3/UL (ref 3.4–10.8)

## 2020-02-27 ENCOUNTER — CLINICAL SUPPORT (OUTPATIENT)
Dept: FAMILY MEDICINE CLINIC | Age: 65
End: 2020-02-27

## 2020-02-27 DIAGNOSIS — E11.9 TYPE 2 DIABETES MELLITUS WITHOUT COMPLICATION, WITHOUT LONG-TERM CURRENT USE OF INSULIN (HCC): Primary | ICD-10-CM

## 2020-02-27 LAB
ALBUMIN UR QL STRIP: 10 MG/L
CREATININE, URINE POC: 100 MG/DL
MICROALBUMIN/CREAT RATIO POC: <30 MG/G

## 2020-02-27 NOTE — PROGRESS NOTES
Pt was unable to leave urine sample at last visit.   Pt is here today for AMB POC URINE, MICROALBUMIN

## 2020-03-01 NOTE — PROGRESS NOTES
Please confirm the patient is taking thyroid medication daily, if so then dose needs to be increased. Diabetes is well controlled. Cholesterol is well controlled. All other labs are within normal limits. Please inform.

## 2020-03-02 NOTE — PROGRESS NOTES
Contacted pt and verified name and . Informed pt of results, pt verbalized understanding, no questions at this time. Pt voiced that she hasn't been taking her thyroid medication. She will restart that medication. Routing to Dr. Noa Suazo.

## 2020-05-18 ENCOUNTER — TELEPHONE (OUTPATIENT)
Dept: FAMILY MEDICINE CLINIC | Age: 65
End: 2020-05-18

## 2020-05-18 NOTE — TELEPHONE ENCOUNTER
Pt is calling [facility] and they need office notes on pt's last visit 2/24/2020    Fax to [phone]

## 2020-06-08 ENCOUNTER — HOSPITAL ENCOUNTER (OUTPATIENT)
Dept: CT IMAGING | Age: 65
Discharge: HOME | End: 2020-06-08
Attending: INTERNAL MEDICINE
Payer: MEDICARE

## 2020-06-08 DIAGNOSIS — Z87.891 PERSONAL HISTORY OF NICOTINE DEPENDENCE: ICD-10-CM

## 2020-06-08 PROCEDURE — G0297 LDCT FOR LUNG CA SCREEN: HCPCS

## 2020-09-12 NOTE — ED PROVIDER NOTES
EMERGENCY DEPARTMENT HISTORY AND PHYSICAL EXAM 
 
 
Date: 9/12/2020 Patient Name: Parth Mancilla History of Presenting Illness Chief Complaint Patient presents with  Shortness of Breath  
  per pt she was diagnosed with A-Fib yesterday and is having shortness of breath HPI: Parth Mancilla, 76 y.o. female presents to the ED with cc of shortness of breath. She was recently diagnosed with atrial fibrillation on Wednesday, was started on Eliquis and metoprolol. She woke up during the night feeling short of breath and lightheaded with some associated palpitations. She now states that her symptoms have much improved. She denies any chest pain, denies any lower extremity pain or swelling, denies any recent cough or fevers, otherwise feels well. She has been compliant with her medications except she did not take her dose this morning. There are no other complaints, changes, or physical findings at this time. PCP: Praneeth Trevino MD 
 
No current facility-administered medications on file prior to encounter. Current Outpatient Medications on File Prior to Encounter Medication Sig Dispense Refill  metoprolol tartrate (LOPRESSOR) 50 mg tablet Take 50 mg by mouth two (2) times a day.  apixaban (Eliquis) 5 mg tablet Take 5 mg by mouth two (2) times a day.  albuterol (PROVENTIL HFA, VENTOLIN HFA, PROAIR HFA) 90 mcg/actuation inhaler Take 2 Puffs by inhalation every four (4) hours as needed for Wheezing for up to 360 days. 1 Inhaler 5  
 glimepiride (AMARYL) 2 mg tablet TAKE 1 TABLET BY MOUTH EVERY DAY IN THE MORNING 90 Tab 3  
 levothyroxine (SYNTHROID) 112 mcg tablet TAKE 1 TABLET BY MOUTH EVERY DAY BEFORE BREAKFAST 90 Tab 3  
 metFORMIN (GLUCOPHAGE) 500 mg tablet TAKE 1 TAB BY MOUTH TWO (2) TIMES DAILY (WITH MEALS). 180 Tab 3  
 sertraline (ZOLOFT) 50 mg tablet TAKE 1 TAB BY MOUTH DAILY.  90 Tab 3  
  valsartan-hydroCHLOROthiazide (DIOVAN-HCT) 80-12.5 mg per tablet TAKE 1 TABLET EVERY DAY 90 Tab 3  [DISCONTINUED] lidocaine (LIDODERM) 5 % Apply patch to the affected area for 12 hours a day and remove for 12 hours a day. 1 Each 0  
 cholecalciferol, VITAMIN D3, (VITAMIN D3) 5,000 unit tab tablet Take  by mouth daily.  letrozole (FEMARA) 2.5 mg tablet   11  
 Omeprazole delayed release (PRILOSEC D/R) 20 mg tablet Take 20 mg by mouth daily. Past History Past Medical History: 
Past Medical History:  
Diagnosis Date [facility]) September 9th 2020  Abscess of chest wall 8/10/2017  Acquired hypothyroidism 2/21/2018  Acute asthmatic bronchitis 8/10/2017  Breast cancer ([facility] 2015 Rt mastectomy  Cancer ([facility] right breast  
 Cigarette nicotine dependence in remission 8/22/2017  Diabetes ([facility]  Dyspepsia and other specified disorders of function of stomach  Family history of colon cancer in mother 8/22/2018  GERD (gastroesophageal reflux disease)  GERD without esophagitis 8/22/2017  Hyperglycemia 8/10/2017  Hypertension  Impacted cerumen of right ear 8/10/2017  Mood swings 8/10/2017  Nausea & vomiting  Obesity 8/10/2017  Urticaria 8/10/2017  Vitamin D deficiency 8/22/2018 Past Surgical History: 
Past Surgical History:  
Procedure Laterality Date [address]  HX BREAST BIOPSY Right 1/19/2016 RIGHT BREAST DEBRIDEMENT OF MASTECTOMY SITE performed by Stephanie Irizarry MD at [facility]  
 tonsillectomy  HX MASTECTOMY Right 12/3/2015 RIGHT BREAST SIMPLE MASTECTOMY WITH RIGHT SENTINEL NODE BIOPSY performed by Stephanie Irizarry MD at [facility] AMBULATORY OR Family History: 
Family History Problem Relation Age of Onset  Cancer Father   
     lung  Osteoporosis Mother  Arthritis-osteo Sister  Cancer Paternal Aunt   
     breast cancer, late 60's-early 66's Social History: 
Social History Tobacco Use  Smoking status: Former Smoker Packs/day: 1.00 Years: 30.00 Pack years: 30.00 Types: Cigarettes Last attempt to quit: 12/3/2015 Years since quittin.7  Smokeless tobacco: Never Used  Tobacco comment: vaped for one year after quitting smoking Substance Use Topics  Alcohol use: No  
  Alcohol/week: 0.0 standard drinks  Drug use: No  
 
 
Allergies: Allergies Allergen Reactions  Latex Rash  Antihistamine [Diphenhydramine Hcl] Unknown (comments)  Iodine Rash  Novocain [Procaine] Palpitations Review of Systems  
no fever 
non eye pain 
o ear pain Reports shortness of breath 
no chest pain 
no abdominal pain 
no dysuria 
no leg pain 
no rash 
no lymphadenopathy 
no weight loss Physical Exam  
Physical Exam 
Constitutional:   
   Appearance: She is well-developed. HENT:  
   Head: Normocephalic and atraumatic. Mouth/Throat:  
   Mouth: Mucous membranes are moist.  
Eyes:  
   Extraocular Movements: Extraocular movements intact. Neck: Musculoskeletal: Normal range of motion. Cardiovascular:  
   Rate and Rhythm: Tachycardia present. Rhythm irregular. Pulmonary:  
   Effort: Pulmonary effort is normal.  
   Breath sounds: Normal breath sounds. Abdominal:  
   Palpations: Abdomen is soft. Tenderness: There is no abdominal tenderness. Musculoskeletal:  
   Right lower leg: She exhibits no tenderness. No edema. Left lower leg: She exhibits no tenderness. No edema. Skin: 
   General: Skin is warm and dry. Neurological:  
   General: No focal deficit present. Mental Status: She is alert and oriented to person, place, and time. Psychiatric:     
   Mood and Affect: Mood normal.  
 
 
 
Diagnostic Study Results Labs - Recent Results (from the past 24 hour(s)) CBC WITH AUTOMATED DIFF Collection Time: 20  2:50 PM  
Result Value Ref Range WBC 9.6 3.6 - 11.0 K/uL  
 RBC 4.82 3.80 - 5.20 M/uL  
 HGB 14.4 11.5 - 16.0 g/dL HCT 44.8 35.0 - 47.0 % MCV 92.9 80.0 - 99.0 FL  
 MCH 29.9 26.0 - 34.0 PG  
 MCHC 32.1 30.0 - 36.5 g/dL  
 RDW 13.7 11.5 - 14.5 % PLATELET 527 870 - 164 K/uL MPV 10.6 8.9 - 12.9 FL  
 NRBC 0.0 0  WBC ABSOLUTE NRBC 0.00 0.00 - 0.01 K/uL NEUTROPHILS 70 32 - 75 % LYMPHOCYTES 18 12 - 49 % MONOCYTES 8 5 - 13 % EOSINOPHILS 2 0 - 7 % BASOPHILS 1 0 - 1 % IMMATURE GRANULOCYTES 1 (H) 0.0 - 0.5 % ABS. NEUTROPHILS 6.8 1.8 - 8.0 K/UL  
 ABS. LYMPHOCYTES 1.8 0.8 - 3.5 K/UL  
 ABS. MONOCYTES 0.8 0.0 - 1.0 K/UL  
 ABS. EOSINOPHILS 0.2 0.0 - 0.4 K/UL  
 ABS. BASOPHILS 0.1 0.0 - 0.1 K/UL  
 ABS. IMM. GRANS. 0.1 (H) 0.00 - 0.04 K/UL  
 DF AUTOMATED METABOLIC PANEL, COMPREHENSIVE Collection Time: 09/12/20  2:50 PM  
Result Value Ref Range Sodium 138 136 - 145 mmol/L Potassium 4.0 3.5 - 5.1 mmol/L Chloride 104 97 - 108 mmol/L  
 CO2 29 21 - 32 mmol/L Anion gap 5 5 - 15 mmol/L Glucose 121 (H) 65 - 100 mg/dL BUN 19 6 - 20 MG/DL Creatinine 1.05 (H) 0.55 - 1.02 MG/DL  
 BUN/Creatinine ratio 18 12 - 20 GFR est AA >60 >60 ml/min/1.73m2 GFR est non-AA 52 (L) >60 ml/min/1.73m2 Calcium 9.6 8.5 - 10.1 MG/DL Bilirubin, total 0.7 0.2 - 1.0 MG/DL  
 ALT (SGPT) 44 12 - 78 U/L  
 AST (SGOT) 37 15 - 37 U/L Alk. phosphatase 61 45 - 117 U/L Protein, total 8.0 6.4 - 8.2 g/dL Albumin 3.8 3.5 - 5.0 g/dL Globulin 4.2 (H) 2.0 - 4.0 g/dL A-G Ratio 0.9 (L) 1.1 - 2.2 CK W/ REFLX CKMB Collection Time: 09/12/20  2:50 PM  
Result Value Ref Range CK 72 26 - 192 U/L  
TROPONIN I Collection Time: 09/12/20  2:50 PM  
Result Value Ref Range Troponin-I, Qt. <0.05 <0.05 ng/mL NT-PRO BNP Collection Time: 09/12/20  2:50 PM  
Result Value Ref Range NT pro-BNP 1,446 (H) <125 PG/ML Radiologic Studies -  
XR CHEST PA LAT Final Result IMPRESSION: No acute cardiopulmonary disease. CT Results  (Last 48 hours) None CXR Results  (Last 48 hours) 09/12/20 1352  XR CHEST PA LAT Final result Impression:  IMPRESSION: No acute cardiopulmonary disease. Narrative: Indication:  shortness of breath Exam: PA and lateral views of the chest.  
   
Direct comparison is made to prior CXR dated May 2012. Findings: Cardiomediastinal silhouette is within normal limits. Lungs are clear  
bilaterally. No pleural fluid is visualized. There is no pneumothorax. Osseous  
structures are diffusely demineralized but intact. Medical Decision Making I am the first provider for this patient. I reviewed the vital signs, available nursing notes, past medical history, past surgical history, family history and social history. Vital Signs-Reviewed the patient's vital signs. Patient Vitals for the past 24 hrs: 
 Temp Pulse Resp BP SpO2  
09/12/20 1525  (!) 113  113/80   
09/12/20 1524     98 % 09/12/20 1321 97.8 °F (36.6 °C) 99 20 127/79 99 % Provider Notes (Medical Decision Making):  
60-year-old female presenting with shortness of breath. On presentation, she is in atrial fibrillation with RVR, likely in the setting of not taking her metoprolol. Her symptoms of palpitations shortness of breath this night is concerning for possible episode of A. fib RVR or other dysrhythmia. She denies any chest pain, symptoms are atypical for ACS, however given her comorbidities,. Will assess as well for possible CHF. No symptoms of infection or pneumonia. She currently states that her symptoms are much improved, resting comfortably without any respiratory distress. ED Course:  
 
Initial assessment performed.  The patients presenting problems have been discussed, and they are in agreement with the care plan formulated and outlined with them. I have encouraged them to ask questions as they arise throughout their visit. She is given 5 mg metoprolol IV with improvement in her heart rate. BNP is elevated, however she has no recent baseline. Chest x-ray unremarkable, troponin negative, basic metabolic panel shows slight RUSLAN with creatinine slightly above recent, otherwise electrolytes unremarkable. She is then given her oral dose of metoprolol. Throughout her stay here, her heart rate is improved, she is now in the 80s to 90s. She denies any complaints, no shortness of breath. States that she feels back to her baseline. Patient is counseled on supportive care and return precautions. Will return to the ED for any worsening shortness of breath, chest pain, lightheadedness. Will followup with cardiology at her appointment within 3 days. Critical Care Time:  
 
 
 
Disposition: 
Home PLAN: 
1. Current Discharge Medication List  
  
 
2. Follow-up Information None Return to ED if worse Diagnosis Clinical Impression: Atrial fibrillation with rapid ventricular response, resolved

## 2020-09-12 NOTE — ED NOTES
Pt discharged by Dr. Sydney Hong. Pt provided with discharge instructions Rx and instructions on follow up care. Pt out of ED ambulatory accompanied by .

## 2020-09-15 NOTE — PROGRESS NOTES
Patient arrived to Non-Invasive Cardiology Lab for Out Patient ANA MARIA and Cardioversion Procedures. Staff introduced to patient. Patient identifiers verified with Name and Date of Birth. Procedure verified with patient. Consent forms reviewed and signed by patient or authorized representative and verified. Allergies verified. Patient informed of procedure and plan of care. Questions answered with review. Patient on cardiac monitor, non-invasive blood pressure, SPO2 monitor. On room air. Patient is A&Ox3. Patient reports no complaints. Patient on stretcher, in low position, with side rails up. Patient instructed to call for assistance as needed. Family in car waiting.

## 2020-09-15 NOTE — PROGRESS NOTES
Discharge instructions reviewed with patient and sister, Jakob Cordero. Allowed adequate time to ask questions, all questions answered. Printed copy of AVS given to patient. All belongings gathered, IV and tele discontinued. Transported via wheelchair to main entrance and into care of family.

## 2020-09-15 NOTE — PROGRESS NOTES
Pt sedated with 1mg Versed and 25mcg Fentanyl for ANA MARIA Pt sedated with an additional 3mg Versed and 25mcg Fentanyl, given 1 synchronized shock(s) at 360 Joules, Afib converted to Sinus bradycardia with PACs. (monitored sedation from 1248 to 1306) EKG obtained

## 2020-09-15 NOTE — PROGRESS NOTES
Brief Procedure Note Patient: Lupe Goodrich MRN: [id number]  SSN: [id number] YOB: 1955  Age: 76 y.o. Sex: female Date of Procedure: 9/15/2020 Pre-procedure Diagnosis: AF Post-procedure Diagnosis: Nml LV sys fxn, mod MR/TR, no ANA CRISTINA clot, DCCV to SR Procedure: ANA MARIA/DCCV Performed By: More Guadarrama III, DO Anesthesia: Moderate Sedation Estimated Blood Loss: Less than 10 mL Specimens:  None Findings: as above Complications: None Implants: None Recommendations: Continue medical therapy. Signed By: More Guadarrama III, DO September 15, 2020

## 2020-09-30 NOTE — TELEPHONE ENCOUNTER
Pt is calling requesting a refill on med      Requested Prescriptions     Pending Prescriptions Disp Refills    metFORMIN (GLUCOPHAGE) 500 mg tablet 180 Tab 3     Sig: TAKE 1 TAB BY MOUTH TWO (2) TIMES DAILY (WITH MEALS).  valsartan-hydroCHLOROthiazide (DIOVAN-HCT) 80-12.5 mg per tablet 90 Tab 3     Sig: TAKE 1 TABLET EVERY DAY    sertraline (ZOLOFT) 50 mg tablet 90 Tab 3     Sig: TAKE 1 TAB BY MOUTH DAILY.     levothyroxine (SYNTHROID) 112 mcg tablet 90 Tab 3     Sig: TAKE 1 TABLET BY MOUTH EVERY DAY BEFORE BREAKFAST    glimepiride (AMARYL) 2 mg tablet 90 Tab 3     Sig: TAKE 1 TABLET BY MOUTH EVERY DAY IN THE MORNING

## 2020-10-29 NOTE — PROGRESS NOTES
Received report from [facility] off going RN. Pt resting without distress. IVF bolus infusing.

## 2020-10-29 NOTE — DISCHARGE INSTRUCTIONS
AFTER YOUR CARDIOVERSION    Be sure someone else drives you home; do not drive today. You may feel drowsy for several hours. Do not eat or drink for at least two hours after your procedure. Your throat will be numb and there is a risk you might have difficulty swallowing for a while. Be careful when you do eat or drink for the first time especially with hot fluids since you could easily burn your throat. Call your doctor if:    · You have trouble breathing all of a sudden. · You have chest pain or any pain that spreads to your neck, jaw, or arms. · You have questions or concerns. Doctor: Amanda Zepeda    Special Instructions:    No driving for 24 hours. Discharge Medications:   {Medication reconciliation information is now added to the patient's AVS automatically when it is printed. There is no need to use this SmartLink in discharge instructions.   Highlight this text and delete it to clear this message}

## 2020-10-29 NOTE — PROGRESS NOTES
Pt sedated with 5mg Versed and 50mcg Fentanyl for Cardioversion, given 1 synchronized shock(s) at 360 Joules, Afib converted to NSR. (monitored sedation from 1253 to 1313)

## 2020-10-29 NOTE — PROGRESS NOTES
SBP 70-80's Dr. Wesley Aguilera ordered 500ml bolus of NS.     1350- 500ml bolus done, BP 92/45  1355- BP 96/54 HR 50's Dr. Wesley Aguilera aware, 500ml extra bolus ordered.   Report given to Cherelle Bazan

## 2020-10-29 NOTE — PROGRESS NOTES
DISCHARGE SUMMARY       The following personal items collected during your admission are returned to you:       Vision:  Glasses  Clothing:  Shirt, walker, mask and walker        PATIENT INSTRUCTIONS: Continue taking all the same medications as prescribed. Note change to Lopressor take 12.5 mg by mouth once in the am and 12.5 mg by mouth once in the pm.      The cardioversion procedure can cause redness to the skin where the patches were placed. You may treat this with Aloe or Cortisone cream over the counter lotion. If the skin appears very reddened or blistered contact your physician      Call to make an appointment with Dr. Samia Jolly in 2 week(s). What to do at Home:  Recommended activity: No driving today      The discharge information has been reviewed with the PATIENT/sister. The PATIENT  verbalized understanding.

## 2020-10-29 NOTE — PROGRESS NOTES
Brief Procedure Note    Patient: Juan Graff MRN: [id number]  SSN: [id number]    YOB: 1955  Age: 76 y.o. Sex: female      Date of Procedure: 10/29/2020     Pre-procedure Diagnosis: AF    Post-procedure Diagnosis: CV to AF    Procedure: DCCV    Performed By: Rock Henson III, DO     Anesthesia: Moderate Sedation    Estimated Blood Loss: Less than 10 mL      Specimens:  None    Findings: as above. Brief period of asystole then bradycardia that resolved into the upper 50s. Complications: None    Implants: None    Recommendations: Continue medical therapy.     Signed By: Jocelyn Dodd DO     October 29, 2020

## 2020-10-29 NOTE — PROGRESS NOTES
Patient arrived to Non-Invasive Cardiology Lab for Out Patient cardioversion Procedure. Staff introduced to patient. Patient identifiers verified with Name and Date of Birth. Procedure verified with patient. Consent forms reviewed and signed by patient or authorized representative and verified. Allergies verified. Patient informed of procedure and plan of care. Questions answered with review. Patient on cardiac monitor, non-invasive blood pressure, SPO2 monitor. On room air. Patient is A&Ox3. Patient reports no complaints. Patient on stretcher, in low position, with side rails up. Patient instructed to call for assistance as needed. Family in waiting room.  Rico Lim

## 2021-01-01 ENCOUNTER — PATIENT OUTREACH (OUTPATIENT)
Dept: CASE MANAGEMENT | Age: 66
End: 2021-01-01

## 2021-01-01 ENCOUNTER — APPOINTMENT (OUTPATIENT)
Dept: NON INVASIVE DIAGNOSTICS | Age: 66
DRG: 215 | End: 2021-01-01
Attending: NURSE PRACTITIONER
Payer: MEDICARE

## 2021-01-01 ENCOUNTER — TRANSCRIBE ORDER (OUTPATIENT)
Dept: REGISTRATION | Age: 66
End: 2021-01-01

## 2021-01-01 ENCOUNTER — APPOINTMENT (OUTPATIENT)
Dept: GENERAL RADIOLOGY | Age: 66
DRG: 215 | End: 2021-01-01
Attending: PHYSICIAN ASSISTANT
Payer: MEDICARE

## 2021-01-01 ENCOUNTER — APPOINTMENT (OUTPATIENT)
Dept: FAMILY MEDICINE CLINIC | Age: 66
End: 2021-01-01

## 2021-01-01 ENCOUNTER — APPOINTMENT (OUTPATIENT)
Dept: VASCULAR SURGERY | Age: 66
DRG: 287 | End: 2021-01-01
Attending: NURSE PRACTITIONER
Payer: MEDICARE

## 2021-01-01 ENCOUNTER — HOSPITAL ENCOUNTER (OUTPATIENT)
Dept: PREADMISSION TESTING | Age: 66
Discharge: HOME | DRG: 215 | End: 2021-06-07
Attending: THORACIC SURGERY (CARDIOTHORACIC VASCULAR SURGERY)
Payer: MEDICARE

## 2021-01-01 ENCOUNTER — APPOINTMENT (OUTPATIENT)
Dept: NON INVASIVE DIAGNOSTICS | Age: 66
DRG: 287 | End: 2021-01-01
Attending: INTERNAL MEDICINE
Payer: MEDICARE

## 2021-01-01 ENCOUNTER — HOSPITAL ENCOUNTER (EMERGENCY)
Age: 66
Discharge: HOME | End: 2021-02-24
Attending: EMERGENCY MEDICINE
Payer: MEDICARE

## 2021-01-01 ENCOUNTER — APPOINTMENT (OUTPATIENT)
Dept: GENERAL RADIOLOGY | Age: 66
DRG: 287 | End: 2021-01-01
Attending: NURSE PRACTITIONER
Payer: MEDICARE

## 2021-01-01 ENCOUNTER — APPOINTMENT (OUTPATIENT)
Dept: GENERAL RADIOLOGY | Age: 66
End: 2021-01-01
Attending: EMERGENCY MEDICINE
Payer: MEDICARE

## 2021-01-01 ENCOUNTER — APPOINTMENT (OUTPATIENT)
Dept: CT IMAGING | Age: 66
DRG: 287 | End: 2021-01-01
Attending: NURSE PRACTITIONER
Payer: MEDICARE

## 2021-01-01 ENCOUNTER — HOSPITAL ENCOUNTER (OUTPATIENT)
Dept: GENERAL RADIOLOGY | Age: 66
Discharge: HOME | End: 2021-04-27
Payer: MEDICARE

## 2021-01-01 ENCOUNTER — ANESTHESIA EVENT (OUTPATIENT)
Dept: CARDIOTHORACIC SURGERY | Age: 66
DRG: 215 | End: 2021-01-01
Payer: MEDICARE

## 2021-01-01 ENCOUNTER — HOSPITAL ENCOUNTER (OUTPATIENT)
Dept: NON INVASIVE DIAGNOSTICS | Age: 66
Discharge: HOME | End: 2021-06-09
Attending: THORACIC SURGERY (CARDIOTHORACIC VASCULAR SURGERY)

## 2021-01-01 ENCOUNTER — HOSPITAL ENCOUNTER (INPATIENT)
Age: 66
LOS: 1 days | DRG: 215 | End: 2021-06-10
Attending: THORACIC SURGERY (CARDIOTHORACIC VASCULAR SURGERY) | Admitting: THORACIC SURGERY (CARDIOTHORACIC VASCULAR SURGERY)
Payer: MEDICARE

## 2021-01-01 ENCOUNTER — TRANSCRIBE ORDER (OUTPATIENT)
Dept: CARDIAC REHAB | Age: 66
End: 2021-01-01

## 2021-01-01 ENCOUNTER — VIRTUAL VISIT (OUTPATIENT)
Dept: FAMILY MEDICINE CLINIC | Age: 66
End: 2021-01-01
Payer: MEDICARE

## 2021-01-01 ENCOUNTER — HOSPITAL ENCOUNTER (OUTPATIENT)
Dept: PREADMISSION TESTING | Age: 66
Discharge: HOME | End: 2021-06-04
Payer: MEDICARE

## 2021-01-01 ENCOUNTER — ANESTHESIA (OUTPATIENT)
Dept: CARDIOTHORACIC SURGERY | Age: 66
DRG: 215 | End: 2021-01-01
Payer: MEDICARE

## 2021-01-01 ENCOUNTER — HOSPITAL ENCOUNTER (INPATIENT)
Age: 66
LOS: 6 days | Discharge: HOME | DRG: 287 | End: 2021-06-03
Attending: INTERNAL MEDICINE | Admitting: INTERNAL MEDICINE
Payer: MEDICARE

## 2021-01-01 ENCOUNTER — TELEPHONE (OUTPATIENT)
Dept: FAMILY MEDICINE CLINIC | Age: 66
End: 2021-01-01

## 2021-01-01 VITALS
HEART RATE: 127 BPM | OXYGEN SATURATION: 98 % | HEIGHT: 61 IN | DIASTOLIC BLOOD PRESSURE: 49 MMHG | RESPIRATION RATE: 36 BRPM | BODY MASS INDEX: 39.13 KG/M2 | TEMPERATURE: 100.8 F | SYSTOLIC BLOOD PRESSURE: 74 MMHG | WEIGHT: 207.23 LBS

## 2021-01-01 VITALS
RESPIRATION RATE: 16 BRPM | HEIGHT: 61 IN | BODY MASS INDEX: 41.46 KG/M2 | WEIGHT: 219.58 LBS | TEMPERATURE: 98.9 F | DIASTOLIC BLOOD PRESSURE: 66 MMHG | HEART RATE: 100 BPM | OXYGEN SATURATION: 96 % | SYSTOLIC BLOOD PRESSURE: 121 MMHG

## 2021-01-01 VITALS
DIASTOLIC BLOOD PRESSURE: 57 MMHG | TEMPERATURE: 98.1 F | HEART RATE: 92 BPM | BODY MASS INDEX: 42.29 KG/M2 | RESPIRATION RATE: 20 BRPM | OXYGEN SATURATION: 96 % | HEIGHT: 60 IN | WEIGHT: 215.39 LBS | SYSTOLIC BLOOD PRESSURE: 100 MMHG

## 2021-01-01 VITALS
WEIGHT: 201.28 LBS | BODY MASS INDEX: 38 KG/M2 | TEMPERATURE: 98 F | OXYGEN SATURATION: 96 % | DIASTOLIC BLOOD PRESSURE: 52 MMHG | SYSTOLIC BLOOD PRESSURE: 98 MMHG | RESPIRATION RATE: 18 BRPM | HEIGHT: 61 IN | HEART RATE: 78 BPM

## 2021-01-01 DIAGNOSIS — J41.1 BRONCHITIS, MUCOPURULENT RECURRENT (HCC): ICD-10-CM

## 2021-01-01 DIAGNOSIS — E55.9 VITAMIN D DEFICIENCY: Primary | ICD-10-CM

## 2021-01-01 DIAGNOSIS — F17.211 CIGARETTE NICOTINE DEPENDENCE IN REMISSION: ICD-10-CM

## 2021-01-01 DIAGNOSIS — E11.9 TYPE 2 DIABETES MELLITUS WITHOUT COMPLICATION, WITHOUT LONG-TERM CURRENT USE OF INSULIN (HCC): ICD-10-CM

## 2021-01-01 DIAGNOSIS — E03.9 ACQUIRED HYPOTHYROIDISM: ICD-10-CM

## 2021-01-01 DIAGNOSIS — R35.0 URINARY FREQUENCY: ICD-10-CM

## 2021-01-01 DIAGNOSIS — E11.65 CONTROLLED TYPE 2 DIABETES MELLITUS WITH HYPERGLYCEMIA, WITHOUT LONG-TERM CURRENT USE OF INSULIN (HCC): ICD-10-CM

## 2021-01-01 DIAGNOSIS — R53.83 MALAISE AND FATIGUE: ICD-10-CM

## 2021-01-01 DIAGNOSIS — E55.9 VITAMIN D DEFICIENCY: ICD-10-CM

## 2021-01-01 DIAGNOSIS — R53.81 MALAISE AND FATIGUE: ICD-10-CM

## 2021-01-01 DIAGNOSIS — I10 ESSENTIAL HYPERTENSION: ICD-10-CM

## 2021-01-01 DIAGNOSIS — I65.23 BILATERAL CAROTID ARTERY STENOSIS: ICD-10-CM

## 2021-01-01 DIAGNOSIS — U07.1 PNEUMONIA DUE TO COVID-19 VIRUS: Primary | ICD-10-CM

## 2021-01-01 DIAGNOSIS — E66.01 OBESITY, MORBID (HCC): ICD-10-CM

## 2021-01-01 DIAGNOSIS — K21.9 GERD WITHOUT ESOPHAGITIS: ICD-10-CM

## 2021-01-01 DIAGNOSIS — I25.119 CORONARY ARTERY DISEASE INVOLVING NATIVE CORONARY ARTERY OF NATIVE HEART WITH ANGINA PECTORIS (HCC): ICD-10-CM

## 2021-01-01 DIAGNOSIS — R45.86 MOOD SWINGS: ICD-10-CM

## 2021-01-01 DIAGNOSIS — J96.11 CHRONIC HYPOXEMIC RESPIRATORY FAILURE (HCC): ICD-10-CM

## 2021-01-01 DIAGNOSIS — R06.02 SOB (SHORTNESS OF BREATH): ICD-10-CM

## 2021-01-01 DIAGNOSIS — J96.11 CHRONIC HYPOXEMIC RESPIRATORY FAILURE (HCC): Primary | ICD-10-CM

## 2021-01-01 DIAGNOSIS — I34.2 NONRHEUMATIC MITRAL VALVE STENOSIS: ICD-10-CM

## 2021-01-01 DIAGNOSIS — J12.82 PNEUMONIA DUE TO COVID-19 VIRUS: Primary | ICD-10-CM

## 2021-01-01 DIAGNOSIS — R07.9 CHEST PAIN, UNSPECIFIED TYPE: ICD-10-CM

## 2021-01-01 DIAGNOSIS — C50.911 MALIGNANT NEOPLASM OF RIGHT FEMALE BREAST, UNSPECIFIED ESTROGEN RECEPTOR STATUS, UNSPECIFIED SITE OF BREAST (HCC): ICD-10-CM

## 2021-01-01 DIAGNOSIS — Z01.810 PREOP CARDIOVASCULAR EXAM: ICD-10-CM

## 2021-01-01 DIAGNOSIS — I21.3 ST ELEVATION MYOCARDIAL INFARCTION (STEMI), UNSPECIFIED ARTERY (HCC): ICD-10-CM

## 2021-01-01 LAB
25(OH)D3 SERPL-MCNC: 76 NG/ML (ref 30–100)
ACT BLD: 263 SECS (ref 79–138)
ACT BLD: 285 SECS (ref 79–138)
ACT BLD: 450 SECS (ref 79–138)
ADMINISTERED INITIALS, ADMINIT: NORMAL
ALBUMIN SERPL-MCNC: 3.1 G/DL (ref 3.5–5)
ALBUMIN SERPL-MCNC: 3.3 G/DL (ref 3.5–5)
ALBUMIN SERPL-MCNC: 3.3 G/DL (ref 3.5–5)
ALBUMIN SERPL-MCNC: 3.4 G/DL (ref 3.5–5)
ALBUMIN SERPL-MCNC: 3.5 G/DL (ref 3.5–5)
ALBUMIN SERPL-MCNC: 3.6 G/DL (ref 3.5–5)
ALBUMIN SERPL-MCNC: 3.7 G/DL (ref 3.5–5)
ALBUMIN SERPL-MCNC: 4 G/DL (ref 3.5–5)
ALBUMIN/GLOB SERPL: 0.8 {RATIO} (ref 1.1–2.2)
ALBUMIN/GLOB SERPL: 0.9 {RATIO} (ref 1.1–2.2)
ALBUMIN/GLOB SERPL: 1.1 {RATIO} (ref 1.1–2.2)
ALBUMIN/GLOB SERPL: 1.3 {RATIO} (ref 1.1–2.2)
ALBUMIN/GLOB SERPL: 1.7 {RATIO} (ref 1.1–2.2)
ALBUMIN/GLOB SERPL: 1.9 {RATIO} (ref 1.1–2.2)
ALBUMIN/GLOB SERPL: 1.9 {RATIO} (ref 1.1–2.2)
ALP SERPL-CCNC: 23 U/L (ref 45–117)
ALP SERPL-CCNC: 29 U/L (ref 45–117)
ALP SERPL-CCNC: 30 U/L (ref 45–117)
ALP SERPL-CCNC: 41 U/L (ref 45–117)
ALP SERPL-CCNC: 53 U/L (ref 45–117)
ALP SERPL-CCNC: 54 U/L (ref 45–117)
ALP SERPL-CCNC: 55 U/L (ref 45–117)
ALP SERPL-CCNC: 58 U/L (ref 45–117)
ALT SERPL-CCNC: 20 U/L (ref 12–78)
ALT SERPL-CCNC: 22 U/L (ref 12–78)
ALT SERPL-CCNC: 24 U/L (ref 12–78)
ALT SERPL-CCNC: 24 U/L (ref 12–78)
ALT SERPL-CCNC: 25 U/L (ref 12–78)
ALT SERPL-CCNC: 27 U/L (ref 12–78)
ALT SERPL-CCNC: 31 U/L (ref 12–78)
ALT SERPL-CCNC: 40 U/L (ref 12–78)
ANION GAP SERPL CALC-SCNC: 10 MMOL/L (ref 5–15)
ANION GAP SERPL CALC-SCNC: 11 MMOL/L (ref 5–15)
ANION GAP SERPL CALC-SCNC: 18 MMOL/L (ref 5–15)
ANION GAP SERPL CALC-SCNC: 4 MMOL/L (ref 5–15)
ANION GAP SERPL CALC-SCNC: 5 MMOL/L (ref 5–15)
ANION GAP SERPL CALC-SCNC: 6 MMOL/L (ref 5–15)
ANION GAP SERPL CALC-SCNC: 6 MMOL/L (ref 5–15)
ANION GAP SERPL CALC-SCNC: 7 MMOL/L (ref 5–15)
ANION GAP SERPL CALC-SCNC: 8 MMOL/L (ref 5–15)
ANION GAP SERPL CALC-SCNC: 9 MMOL/L (ref 5–15)
APPEARANCE UR: CLEAR
APTT PPP: 29.5 SEC (ref 22.1–31)
APTT PPP: 33.3 SEC (ref 22.1–31)
APTT PPP: 51.5 SEC (ref 22.1–31)
ARTERIAL PATENCY WRIST A: ABNORMAL
ARTERIAL PATENCY WRIST A: POSITIVE
AST SERPL-CCNC: 146 U/L (ref 15–37)
AST SERPL-CCNC: 178 U/L (ref 15–37)
AST SERPL-CCNC: 183 U/L (ref 15–37)
AST SERPL-CCNC: 19 U/L (ref 15–37)
AST SERPL-CCNC: 203 U/L (ref 15–37)
AST SERPL-CCNC: 24 U/L (ref 15–37)
AST SERPL-CCNC: 24 U/L (ref 15–37)
AST SERPL-CCNC: 26 U/L (ref 15–37)
AST SERPL-CCNC: 27 U/L (ref 15–37)
AST SERPL-CCNC: 30 U/L (ref 15–37)
ATRIAL RATE: 119 BPM
ATRIAL RATE: 128 BPM
ATRIAL RATE: 150 BPM
ATRIAL RATE: 56 BPM
BACTERIA SPEC CULT: NORMAL
BACTERIA URNS QL MICRO: ABNORMAL /HPF
BACTERIA URNS QL MICRO: ABNORMAL /HPF
BACTERIA URNS QL MICRO: NEGATIVE /HPF
BASE DEFICIT BLD-SCNC: 2.1 MMOL/L
BASE DEFICIT BLD-SCNC: 3 MMOL/L
BASE DEFICIT BLDA-SCNC: 1 MMOL/L
BASE DEFICIT BLDA-SCNC: 1.6 MMOL/L
BASE DEFICIT BLDA-SCNC: 10.2 MMOL/L
BASE DEFICIT BLDA-SCNC: 11.6 MMOL/L
BASE DEFICIT BLDA-SCNC: 5.5 MMOL/L
BASE DEFICIT BLDA-SCNC: 6.5 MMOL/L
BASE EXCESS BLDA CALC-SCNC: 0.2 MMOL/L
BASE EXCESS BLDA CALC-SCNC: 0.7 MMOL/L
BASOPHILS # BLD: 0 K/UL (ref 0–0.1)
BASOPHILS # BLD: 0.1 K/UL (ref 0–0.1)
BASOPHILS # BLD: 0.1 K/UL (ref 0–0.1)
BASOPHILS NFR BLD: 0 % (ref 0–1)
BASOPHILS NFR BLD: 1 % (ref 0–1)
BDY SITE: ABNORMAL
BILIRUB DIRECT SERPL-MCNC: 0.2 MG/DL (ref 0–0.2)
BILIRUB SERPL-MCNC: 0.5 MG/DL (ref 0.2–1)
BILIRUB SERPL-MCNC: 0.5 MG/DL (ref 0.2–1)
BILIRUB SERPL-MCNC: 0.6 MG/DL (ref 0.2–1)
BILIRUB SERPL-MCNC: 0.6 MG/DL (ref 0.2–1)
BILIRUB SERPL-MCNC: 0.7 MG/DL (ref 0.2–1)
BILIRUB SERPL-MCNC: 0.7 MG/DL (ref 0.2–1)
BILIRUB SERPL-MCNC: 0.8 MG/DL (ref 0.2–1)
BILIRUB SERPL-MCNC: 0.8 MG/DL (ref 0.2–1)
BILIRUB SERPL-MCNC: 1 MG/DL (ref 0.2–1)
BILIRUB SERPL-MCNC: 1.8 MG/DL (ref 0.2–1)
BILIRUB UR QL: NEGATIVE
BNP SERPL-MCNC: 5823 PG/ML
BNP SERPL-MCNC: 946 PG/ML
BUN SERPL-MCNC: 11 MG/DL (ref 6–20)
BUN SERPL-MCNC: 12 MG/DL (ref 6–20)
BUN SERPL-MCNC: 13 MG/DL (ref 6–20)
BUN SERPL-MCNC: 15 MG/DL (ref 6–20)
BUN SERPL-MCNC: 15 MG/DL (ref 6–20)
BUN SERPL-MCNC: 16 MG/DL (ref 6–20)
BUN SERPL-MCNC: 17 MG/DL (ref 6–20)
BUN SERPL-MCNC: 17 MG/DL (ref 6–20)
BUN SERPL-MCNC: 18 MG/DL (ref 6–20)
BUN SERPL-MCNC: 19 MG/DL (ref 6–20)
BUN SERPL-MCNC: 19 MG/DL (ref 6–20)
BUN SERPL-MCNC: 27 MG/DL (ref 6–20)
BUN/CREAT SERPL: 11 (ref 12–20)
BUN/CREAT SERPL: 14 (ref 12–20)
BUN/CREAT SERPL: 15 (ref 12–20)
BUN/CREAT SERPL: 16 (ref 12–20)
BUN/CREAT SERPL: 17 (ref 12–20)
BUN/CREAT SERPL: 17 (ref 12–20)
BUN/CREAT SERPL: 18 (ref 12–20)
BUN/CREAT SERPL: 20 (ref 12–20)
BUN/CREAT SERPL: 22 (ref 12–20)
BUN/CREAT SERPL: 22 (ref 12–20)
BUN/CREAT SERPL: 24 (ref 12–20)
BUN/CREAT SERPL: 24 (ref 12–20)
CA-I BLD-SCNC: 1.06 MMOL/L (ref 1.13–1.32)
CA-I BLD-SCNC: 1.21 MMOL/L (ref 1.13–1.32)
CA-I BLD-SCNC: 1.31 MMOL/L (ref 1.13–1.32)
CALCIUM SERPL-MCNC: 8.2 MG/DL (ref 8.5–10.1)
CALCIUM SERPL-MCNC: 8.7 MG/DL (ref 8.5–10.1)
CALCIUM SERPL-MCNC: 8.7 MG/DL (ref 8.5–10.1)
CALCIUM SERPL-MCNC: 8.9 MG/DL (ref 8.5–10.1)
CALCIUM SERPL-MCNC: 9 MG/DL (ref 8.5–10.1)
CALCIUM SERPL-MCNC: 9.1 MG/DL (ref 8.5–10.1)
CALCIUM SERPL-MCNC: 9.1 MG/DL (ref 8.5–10.1)
CALCIUM SERPL-MCNC: 9.2 MG/DL (ref 8.5–10.1)
CALCIUM SERPL-MCNC: 9.3 MG/DL (ref 8.5–10.1)
CALCIUM SERPL-MCNC: 9.4 MG/DL (ref 8.5–10.1)
CALCIUM SERPL-MCNC: 9.4 MG/DL (ref 8.5–10.1)
CALCIUM SERPL-MCNC: 9.5 MG/DL (ref 8.5–10.1)
CALCULATED P AXIS, ECG09: 49 DEGREES
CALCULATED R AXIS, ECG10: 53 DEGREES
CALCULATED R AXIS, ECG10: 59 DEGREES
CALCULATED R AXIS, ECG10: 70 DEGREES
CALCULATED R AXIS, ECG10: 78 DEGREES
CALCULATED T AXIS, ECG11: -65 DEGREES
CALCULATED T AXIS, ECG11: 17 DEGREES
CALCULATED T AXIS, ECG11: 25 DEGREES
CALCULATED T AXIS, ECG11: 37 DEGREES
CC UR VC: NORMAL
CHLORIDE SERPL-SCNC: 101 MMOL/L (ref 97–108)
CHLORIDE SERPL-SCNC: 102 MMOL/L (ref 97–108)
CHLORIDE SERPL-SCNC: 103 MMOL/L (ref 97–108)
CHLORIDE SERPL-SCNC: 103 MMOL/L (ref 97–108)
CHLORIDE SERPL-SCNC: 104 MMOL/L (ref 97–108)
CHLORIDE SERPL-SCNC: 105 MMOL/L (ref 97–108)
CHLORIDE SERPL-SCNC: 106 MMOL/L (ref 97–108)
CHLORIDE SERPL-SCNC: 107 MMOL/L (ref 97–108)
CHLORIDE SERPL-SCNC: 108 MMOL/L (ref 97–108)
CHLORIDE SERPL-SCNC: 110 MMOL/L (ref 97–108)
CHLORIDE SERPL-SCNC: 112 MMOL/L (ref 97–108)
CHLORIDE SERPL-SCNC: 112 MMOL/L (ref 97–108)
CHLORIDE SERPL-SCNC: 113 MMOL/L (ref 97–108)
CHLORIDE SERPL-SCNC: 115 MMOL/L (ref 97–108)
CHOLEST SERPL-MCNC: 129 MG/DL
CO2 SERPL-SCNC: 17 MMOL/L (ref 21–32)
CO2 SERPL-SCNC: 22 MMOL/L (ref 21–32)
CO2 SERPL-SCNC: 22 MMOL/L (ref 21–32)
CO2 SERPL-SCNC: 24 MMOL/L (ref 21–32)
CO2 SERPL-SCNC: 25 MMOL/L (ref 21–32)
CO2 SERPL-SCNC: 26 MMOL/L (ref 21–32)
CO2 SERPL-SCNC: 26 MMOL/L (ref 21–32)
CO2 SERPL-SCNC: 28 MMOL/L (ref 21–32)
CO2 SERPL-SCNC: 28 MMOL/L (ref 21–32)
CO2 SERPL-SCNC: 29 MMOL/L (ref 21–32)
COHGB MFR BLD: 0.8 % (ref 1–2)
COLOR UR: ABNORMAL
COVID-19 RAPID TEST, COVR: NOT DETECTED
CREAT SERPL-MCNC: 0.66 MG/DL (ref 0.55–1.02)
CREAT SERPL-MCNC: 0.67 MG/DL (ref 0.55–1.02)
CREAT SERPL-MCNC: 0.68 MG/DL (ref 0.55–1.02)
CREAT SERPL-MCNC: 0.75 MG/DL (ref 0.55–1.02)
CREAT SERPL-MCNC: 0.84 MG/DL (ref 0.55–1.02)
CREAT SERPL-MCNC: 0.85 MG/DL (ref 0.55–1.02)
CREAT SERPL-MCNC: 0.85 MG/DL (ref 0.55–1.02)
CREAT SERPL-MCNC: 0.86 MG/DL (ref 0.55–1.02)
CREAT SERPL-MCNC: 0.86 MG/DL (ref 0.55–1.02)
CREAT SERPL-MCNC: 0.92 MG/DL (ref 0.55–1.02)
CREAT SERPL-MCNC: 1.04 MG/DL (ref 0.55–1.02)
CREAT SERPL-MCNC: 1.08 MG/DL (ref 0.55–1.02)
CREAT SERPL-MCNC: 1.1 MG/DL (ref 0.55–1.02)
CREAT SERPL-MCNC: 1.82 MG/DL (ref 0.55–1.02)
CREAT UR-MCNC: 103 MG/DL
D50 ADMINISTERED, D50ADM: 0 ML
D50 ORDER, D50ORD: 0 ML
DIAGNOSIS, 93000: NORMAL
DIFFERENTIAL METHOD BLD: ABNORMAL
ECHO AV PEAK GRADIENT: 18.92 MMHG
ECHO AV PEAK VELOCITY: 217.48 CM/S
ECHO LV INTERNAL DIMENSION DIASTOLIC MMODE: 5.63 CM
ECHO LV INTERNAL DIMENSION SYSTOLIC MMODE: 4.92 CM
ECHO LV IVSD MMODE: 2.18 CM
ECHO LV IVSS MMODE: 2.1 CM
ECHO LV POSTERIOR WALL DIASTOLIC MMODE: 2.06 CM
ECHO MV AREA PHT: 3.99 CM2
ECHO MV PRESSURE HALF TIME (PHT): 55.19 MS
ECHO TV REGURGITANT MAX VELOCITY: 250.76 CM/S
ECHO TV REGURGITANT PEAK GRADIENT: 25.24 MMHG
EOSINOPHIL # BLD: 0 K/UL (ref 0–0.4)
EOSINOPHIL # BLD: 0.2 K/UL (ref 0–0.4)
EOSINOPHIL # BLD: 0.2 K/UL (ref 0–0.4)
EOSINOPHIL NFR BLD: 0 % (ref 0–7)
EOSINOPHIL NFR BLD: 2 % (ref 0–7)
EOSINOPHIL NFR BLD: 2 % (ref 0–7)
EPITH CASTS URNS QL MICRO: ABNORMAL /LPF
ERYTHROCYTE [DISTWIDTH] IN BLOOD BY AUTOMATED COUNT: 12.3 % (ref 11.5–14.5)
ERYTHROCYTE [DISTWIDTH] IN BLOOD BY AUTOMATED COUNT: 13.2 % (ref 11.5–14.5)
ERYTHROCYTE [DISTWIDTH] IN BLOOD BY AUTOMATED COUNT: 13.4 % (ref 11.5–14.5)
ERYTHROCYTE [DISTWIDTH] IN BLOOD BY AUTOMATED COUNT: 13.4 % (ref 11.5–14.5)
ERYTHROCYTE [DISTWIDTH] IN BLOOD BY AUTOMATED COUNT: 13.5 % (ref 11.5–14.5)
ERYTHROCYTE [DISTWIDTH] IN BLOOD BY AUTOMATED COUNT: 13.6 % (ref 11.5–14.5)
ERYTHROCYTE [DISTWIDTH] IN BLOOD BY AUTOMATED COUNT: 14.2 % (ref 11.5–14.5)
EST. AVERAGE GLUCOSE BLD GHB EST-MCNC: 117 MG/DL
EST. AVERAGE GLUCOSE BLD GHB EST-MCNC: 120 MG/DL
FIO2 ON VENT: 100 %
FIO2 ON VENT: 50 %
FIO2 ON VENT: 80 %
GAS FLOW.O2 O2 DELIVERY SYS: ABNORMAL L/MIN
GAS FLOW.O2 SETTING OXYMISER: 12 BPM
GAS FLOW.O2 SETTING OXYMISER: 12 L/MIN
GLOBULIN SER CALC-MCNC: 2 G/DL (ref 2–4)
GLOBULIN SER CALC-MCNC: 2.1 G/DL (ref 2–4)
GLOBULIN SER CALC-MCNC: 2.1 G/DL (ref 2–4)
GLOBULIN SER CALC-MCNC: 2.3 G/DL (ref 2–4)
GLOBULIN SER CALC-MCNC: 3.2 G/DL (ref 2–4)
GLOBULIN SER CALC-MCNC: 3.8 G/DL (ref 2–4)
GLOBULIN SER CALC-MCNC: 3.8 G/DL (ref 2–4)
GLOBULIN SER CALC-MCNC: 4 G/DL (ref 2–4)
GLOBULIN SER CALC-MCNC: 4.1 G/DL (ref 2–4)
GLOBULIN SER CALC-MCNC: 4.5 G/DL (ref 2–4)
GLSCOM COMMENTS: NORMAL
GLUCOSE BLD STRIP.AUTO-MCNC: 100 MG/DL (ref 65–117)
GLUCOSE BLD STRIP.AUTO-MCNC: 101 MG/DL (ref 65–117)
GLUCOSE BLD STRIP.AUTO-MCNC: 102 MG/DL (ref 65–117)
GLUCOSE BLD STRIP.AUTO-MCNC: 103 MG/DL (ref 65–117)
GLUCOSE BLD STRIP.AUTO-MCNC: 104 MG/DL (ref 65–117)
GLUCOSE BLD STRIP.AUTO-MCNC: 105 MG/DL (ref 65–117)
GLUCOSE BLD STRIP.AUTO-MCNC: 108 MG/DL (ref 65–117)
GLUCOSE BLD STRIP.AUTO-MCNC: 109 MG/DL (ref 65–117)
GLUCOSE BLD STRIP.AUTO-MCNC: 110 MG/DL (ref 65–117)
GLUCOSE BLD STRIP.AUTO-MCNC: 111 MG/DL (ref 65–117)
GLUCOSE BLD STRIP.AUTO-MCNC: 112 MG/DL (ref 65–117)
GLUCOSE BLD STRIP.AUTO-MCNC: 112 MG/DL (ref 65–117)
GLUCOSE BLD STRIP.AUTO-MCNC: 113 MG/DL (ref 65–117)
GLUCOSE BLD STRIP.AUTO-MCNC: 114 MG/DL (ref 65–117)
GLUCOSE BLD STRIP.AUTO-MCNC: 115 MG/DL (ref 65–117)
GLUCOSE BLD STRIP.AUTO-MCNC: 115 MG/DL (ref 65–117)
GLUCOSE BLD STRIP.AUTO-MCNC: 119 MG/DL (ref 65–117)
GLUCOSE BLD STRIP.AUTO-MCNC: 120 MG/DL (ref 65–117)
GLUCOSE BLD STRIP.AUTO-MCNC: 120 MG/DL (ref 65–117)
GLUCOSE BLD STRIP.AUTO-MCNC: 121 MG/DL (ref 65–117)
GLUCOSE BLD STRIP.AUTO-MCNC: 121 MG/DL (ref 65–117)
GLUCOSE BLD STRIP.AUTO-MCNC: 122 MG/DL (ref 65–117)
GLUCOSE BLD STRIP.AUTO-MCNC: 122 MG/DL (ref 65–117)
GLUCOSE BLD STRIP.AUTO-MCNC: 124 MG/DL (ref 65–117)
GLUCOSE BLD STRIP.AUTO-MCNC: 128 MG/DL (ref 65–117)
GLUCOSE BLD STRIP.AUTO-MCNC: 129 MG/DL (ref 65–117)
GLUCOSE BLD STRIP.AUTO-MCNC: 129 MG/DL (ref 65–117)
GLUCOSE BLD STRIP.AUTO-MCNC: 130 MG/DL (ref 65–117)
GLUCOSE BLD STRIP.AUTO-MCNC: 136 MG/DL (ref 65–117)
GLUCOSE BLD STRIP.AUTO-MCNC: 139 MG/DL (ref 65–117)
GLUCOSE BLD STRIP.AUTO-MCNC: 151 MG/DL (ref 65–117)
GLUCOSE BLD STRIP.AUTO-MCNC: 165 MG/DL (ref 65–117)
GLUCOSE BLD STRIP.AUTO-MCNC: 166 MG/DL (ref 65–117)
GLUCOSE BLD STRIP.AUTO-MCNC: 187 MG/DL (ref 65–117)
GLUCOSE BLD STRIP.AUTO-MCNC: 195 MG/DL (ref 65–117)
GLUCOSE BLD STRIP.AUTO-MCNC: 197 MG/DL (ref 65–117)
GLUCOSE BLD STRIP.AUTO-MCNC: 83 MG/DL (ref 65–117)
GLUCOSE BLD STRIP.AUTO-MCNC: 86 MG/DL (ref 65–117)
GLUCOSE BLD STRIP.AUTO-MCNC: 87 MG/DL (ref 65–117)
GLUCOSE BLD STRIP.AUTO-MCNC: 89 MG/DL (ref 65–117)
GLUCOSE BLD STRIP.AUTO-MCNC: 92 MG/DL (ref 65–117)
GLUCOSE BLD STRIP.AUTO-MCNC: 96 MG/DL (ref 65–117)
GLUCOSE BLD STRIP.AUTO-MCNC: 97 MG/DL (ref 65–117)
GLUCOSE BLD STRIP.AUTO-MCNC: 97 MG/DL (ref 65–117)
GLUCOSE SERPL-MCNC: 103 MG/DL (ref 65–100)
GLUCOSE SERPL-MCNC: 104 MG/DL (ref 65–100)
GLUCOSE SERPL-MCNC: 109 MG/DL (ref 65–100)
GLUCOSE SERPL-MCNC: 119 MG/DL (ref 65–100)
GLUCOSE SERPL-MCNC: 126 MG/DL (ref 65–100)
GLUCOSE SERPL-MCNC: 128 MG/DL (ref 65–100)
GLUCOSE SERPL-MCNC: 129 MG/DL (ref 65–100)
GLUCOSE SERPL-MCNC: 129 MG/DL (ref 65–100)
GLUCOSE SERPL-MCNC: 133 MG/DL (ref 65–100)
GLUCOSE SERPL-MCNC: 143 MG/DL (ref 65–100)
GLUCOSE SERPL-MCNC: 149 MG/DL (ref 65–100)
GLUCOSE SERPL-MCNC: 201 MG/DL (ref 65–100)
GLUCOSE SERPL-MCNC: 93 MG/DL (ref 65–100)
GLUCOSE SERPL-MCNC: 95 MG/DL (ref 65–100)
GLUCOSE UR STRIP.AUTO-MCNC: NEGATIVE MG/DL
GLUCOSE, GLC: 100 MG/DL
GLUCOSE, GLC: 102 MG/DL
GLUCOSE, GLC: 103 MG/DL
GLUCOSE, GLC: 103 MG/DL
GLUCOSE, GLC: 104 MG/DL
GLUCOSE, GLC: 105 MG/DL
GLUCOSE, GLC: 108 MG/DL
GLUCOSE, GLC: 109 MG/DL
GLUCOSE, GLC: 112 MG/DL
GLUCOSE, GLC: 114 MG/DL
GLUCOSE, GLC: 115 MG/DL
GLUCOSE, GLC: 115 MG/DL
GLUCOSE, GLC: 121 MG/DL
GLUCOSE, GLC: 122 MG/DL
GLUCOSE, GLC: 130 MG/DL
GLUCOSE, GLC: 135 MG/DL
GLUCOSE, GLC: 139 MG/DL
GLUCOSE, GLC: 156 MG/DL
GLUCOSE, GLC: 160 MG/DL
GLUCOSE, GLC: 163 MG/DL
GLUCOSE, GLC: 165 MG/DL
GLUCOSE, GLC: 167 MG/DL
GLUCOSE, GLC: 175 MG/DL
GLUCOSE, GLC: 187 MG/DL
GLUCOSE, GLC: 188 MG/DL
GLUCOSE, GLC: 195 MG/DL
GLUCOSE, GLC: 86 MG/DL
GLUCOSE, GLC: 87 MG/DL
GLUCOSE, GLC: 92 MG/DL
GLUCOSE, GLC: 97 MG/DL
GLUCOSE, GLC: 97 MG/DL
HBA1C MFR BLD: 5.7 % (ref 4–5.6)
HBA1C MFR BLD: 5.8 % (ref 4–5.6)
HCO3 BLD-SCNC: 21.4 MMOL/L (ref 22–26)
HCO3 BLD-SCNC: 21.6 MMOL/L (ref 22–26)
HCO3 BLDA-SCNC: 15 MMOL/L (ref 22–26)
HCO3 BLDA-SCNC: 15 MMOL/L (ref 22–26)
HCO3 BLDA-SCNC: 20 MMOL/L (ref 22–26)
HCO3 BLDA-SCNC: 22 MMOL/L (ref 22–26)
HCO3 BLDA-SCNC: 22 MMOL/L (ref 22–26)
HCO3 BLDA-SCNC: 24 MMOL/L (ref 22–26)
HCT VFR BLD AUTO: 12.3 % (ref 35–47)
HCT VFR BLD AUTO: 13.9 % (ref 35–47)
HCT VFR BLD AUTO: 25.9 % (ref 35–47)
HCT VFR BLD AUTO: 27 % (ref 35–47)
HCT VFR BLD AUTO: 27.3 % (ref 35–47)
HCT VFR BLD AUTO: 29.1 % (ref 35–47)
HCT VFR BLD AUTO: 29.9 % (ref 35–47)
HCT VFR BLD AUTO: 35.8 % (ref 35–47)
HCT VFR BLD AUTO: 35.9 % (ref 35–47)
HCT VFR BLD AUTO: 36.2 % (ref 35–47)
HCT VFR BLD AUTO: 37.7 % (ref 35–47)
HCT VFR BLD AUTO: 38.3 % (ref 35–47)
HCT VFR BLD AUTO: 44.3 % (ref 35–47)
HDLC SERPL-MCNC: 41 MG/DL
HDLC SERPL: 3.1 {RATIO} (ref 0–5)
HGB BLD OXIMETRY-MCNC: 9.5 G/DL (ref 14–17)
HGB BLD-MCNC: 11.2 G/DL (ref 11.5–16)
HGB BLD-MCNC: 11.7 G/DL (ref 11.5–16)
HGB BLD-MCNC: 11.7 G/DL (ref 11.5–16)
HGB BLD-MCNC: 11.8 G/DL (ref 11.5–16)
HGB BLD-MCNC: 12.2 G/DL (ref 11.5–16)
HGB BLD-MCNC: 14.6 G/DL (ref 11.5–16)
HGB BLD-MCNC: 3.7 G/DL (ref 11.5–16)
HGB BLD-MCNC: 4.2 G/DL (ref 11.5–16)
HGB BLD-MCNC: 8.6 G/DL (ref 11.5–16)
HGB BLD-MCNC: 8.7 G/DL (ref 11.5–16)
HGB BLD-MCNC: 8.9 G/DL (ref 11.5–16)
HGB BLD-MCNC: 9.6 G/DL (ref 11.5–16)
HGB BLD-MCNC: 9.7 G/DL (ref 11.5–16)
HGB UR QL STRIP: ABNORMAL
HGB UR QL STRIP: NEGATIVE
HGB UR QL STRIP: NEGATIVE
HIGH TARGET, HITG: 135 MG/DL
HIGH TARGET, HITG: 140 MG/DL
HISTORY CHECKED?,CKHIST: NORMAL
HYALINE CASTS URNS QL MICRO: ABNORMAL /LPF (ref 0–5)
HYALINE CASTS URNS QL MICRO: ABNORMAL /LPF (ref 0–5)
IMM GRANULOCYTES # BLD AUTO: 0 K/UL (ref 0–0.04)
IMM GRANULOCYTES # BLD AUTO: 0.1 K/UL (ref 0–0.04)
IMM GRANULOCYTES # BLD AUTO: 0.2 K/UL (ref 0–0.04)
IMM GRANULOCYTES # BLD AUTO: 0.2 K/UL (ref 0–0.04)
IMM GRANULOCYTES NFR BLD AUTO: 0 % (ref 0–0.5)
IMM GRANULOCYTES NFR BLD AUTO: 0 % (ref 0–0.5)
IMM GRANULOCYTES NFR BLD AUTO: 1 % (ref 0–0.5)
INR PPP: 1.3 (ref 0.9–1.1)
INR PPP: 1.3 (ref 0.9–1.1)
INSULIN ADMINSTERED, INSADM: 1.5 UNITS/HOUR
INSULIN ADMINSTERED, INSADM: 10 UNITS/HOUR
INSULIN ADMINSTERED, INSADM: 11.5 UNITS/HOUR
INSULIN ADMINSTERED, INSADM: 14.9 UNITS/HOUR
INSULIN ADMINSTERED, INSADM: 2.1 UNITS/HOUR
INSULIN ADMINSTERED, INSADM: 2.2 UNITS/HOUR
INSULIN ADMINSTERED, INSADM: 2.3 UNITS/HOUR
INSULIN ADMINSTERED, INSADM: 2.4 UNITS/HOUR
INSULIN ADMINSTERED, INSADM: 2.7 UNITS/HOUR
INSULIN ADMINSTERED, INSADM: 2.7 UNITS/HOUR
INSULIN ADMINSTERED, INSADM: 3.1 UNITS/HOUR
INSULIN ADMINSTERED, INSADM: 3.4 UNITS/HOUR
INSULIN ADMINSTERED, INSADM: 3.5 UNITS/HOUR
INSULIN ADMINSTERED, INSADM: 3.5 UNITS/HOUR
INSULIN ADMINSTERED, INSADM: 3.8 UNITS/HOUR
INSULIN ADMINSTERED, INSADM: 3.9 UNITS/HOUR
INSULIN ADMINSTERED, INSADM: 4.1 UNITS/HOUR
INSULIN ADMINSTERED, INSADM: 4.7 UNITS/HOUR
INSULIN ADMINSTERED, INSADM: 4.8 UNITS/HOUR
INSULIN ADMINSTERED, INSADM: 5 UNITS/HOUR
INSULIN ADMINSTERED, INSADM: 5.3 UNITS/HOUR
INSULIN ADMINSTERED, INSADM: 5.4 UNITS/HOUR
INSULIN ADMINSTERED, INSADM: 5.7 UNITS/HOUR
INSULIN ADMINSTERED, INSADM: 6.1 UNITS/HOUR
INSULIN ADMINSTERED, INSADM: 6.4 UNITS/HOUR
INSULIN ADMINSTERED, INSADM: 7.2 UNITS/HOUR
INSULIN ADMINSTERED, INSADM: 8.4 UNITS/HOUR
INSULIN ADMINSTERED, INSADM: 9.2 UNITS/HOUR
INSULIN ORDER, INSORD: 1.5 UNITS/HOUR
INSULIN ORDER, INSORD: 10 UNITS/HOUR
INSULIN ORDER, INSORD: 11.5 UNITS/HOUR
INSULIN ORDER, INSORD: 14.9 UNITS/HOUR
INSULIN ORDER, INSORD: 2.1 UNITS/HOUR
INSULIN ORDER, INSORD: 2.2 UNITS/HOUR
INSULIN ORDER, INSORD: 2.3 UNITS/HOUR
INSULIN ORDER, INSORD: 2.4 UNITS/HOUR
INSULIN ORDER, INSORD: 2.7 UNITS/HOUR
INSULIN ORDER, INSORD: 2.7 UNITS/HOUR
INSULIN ORDER, INSORD: 3.1 UNITS/HOUR
INSULIN ORDER, INSORD: 3.4 UNITS/HOUR
INSULIN ORDER, INSORD: 3.5 UNITS/HOUR
INSULIN ORDER, INSORD: 3.5 UNITS/HOUR
INSULIN ORDER, INSORD: 3.8 UNITS/HOUR
INSULIN ORDER, INSORD: 3.9 UNITS/HOUR
INSULIN ORDER, INSORD: 4.1 UNITS/HOUR
INSULIN ORDER, INSORD: 4.7 UNITS/HOUR
INSULIN ORDER, INSORD: 4.8 UNITS/HOUR
INSULIN ORDER, INSORD: 5 UNITS/HOUR
INSULIN ORDER, INSORD: 5.3 UNITS/HOUR
INSULIN ORDER, INSORD: 5.4 UNITS/HOUR
INSULIN ORDER, INSORD: 5.7 UNITS/HOUR
INSULIN ORDER, INSORD: 6.1 UNITS/HOUR
INSULIN ORDER, INSORD: 6.4 UNITS/HOUR
INSULIN ORDER, INSORD: 7.2 UNITS/HOUR
INSULIN ORDER, INSORD: 8.4 UNITS/HOUR
INSULIN ORDER, INSORD: 9.2 UNITS/HOUR
IRON SATN MFR SERPL: 10 % (ref 20–50)
IRON SERPL-MCNC: 16 UG/DL (ref 35–150)
KETONES UR QL STRIP.AUTO: NEGATIVE MG/DL
LACTATE SERPL-SCNC: 15.6 MMOL/L (ref 0.4–2)
LDH SERPL L TO P-CCNC: 234 U/L (ref 81–246)
LDLC SERPL CALC-MCNC: 68.4 MG/DL (ref 0–100)
LEFT CCA DIST DIAS: 20 CM/S
LEFT CCA DIST SYS: 48.2 CM/S
LEFT CCA PROX DIAS: 21.3 CM/S
LEFT CCA PROX SYS: 71 CM/S
LEFT ECA DIAS: 5.72 CM/S
LEFT ECA SYS: 41.2 CM/S
LEFT ICA DIST DIAS: 24.3 CM/S
LEFT ICA DIST SYS: 73.2 CM/S
LEFT ICA MID DIAS: 19.3 CM/S
LEFT ICA MID SYS: 61.6 CM/S
LEFT ICA PROX DIAS: 20 CM/S
LEFT ICA PROX SYS: 54.9 CM/S
LEFT ICA/CCA SYS: 1.52
LEFT SUBCLAVIAN DIAS: 0 CM/S
LEFT SUBCLAVIAN SYS: 79.5 CM/S
LEFT VERTEBRAL DIAS: 14.11 CM/S
LEFT VERTEBRAL SYS: 41.3 CM/S
LEUKOCYTE ESTERASE UR QL STRIP.AUTO: ABNORMAL
LOW TARGET, LOT: 100 MG/DL
LOW TARGET, LOT: 95 MG/DL
LYMPHOCYTES # BLD: 0.9 K/UL (ref 0.8–3.5)
LYMPHOCYTES # BLD: 1 K/UL (ref 0.8–3.5)
LYMPHOCYTES # BLD: 1.2 K/UL (ref 0.8–3.5)
LYMPHOCYTES # BLD: 1.9 K/UL (ref 0.8–3.5)
LYMPHOCYTES # BLD: 2 K/UL (ref 0.8–3.5)
LYMPHOCYTES # BLD: 2.2 K/UL (ref 0.8–3.5)
LYMPHOCYTES NFR BLD: 12 % (ref 12–49)
LYMPHOCYTES NFR BLD: 15 % (ref 12–49)
LYMPHOCYTES NFR BLD: 17 % (ref 12–49)
LYMPHOCYTES NFR BLD: 21 % (ref 12–49)
LYMPHOCYTES NFR BLD: 6 % (ref 12–49)
LYMPHOCYTES NFR BLD: 9 % (ref 12–49)
MAGNESIUM SERPL-MCNC: 1.8 MG/DL (ref 1.6–2.4)
MAGNESIUM SERPL-MCNC: 2.1 MG/DL (ref 1.6–2.4)
MAGNESIUM SERPL-MCNC: 2.1 MG/DL (ref 1.6–2.4)
MAGNESIUM SERPL-MCNC: 2.6 MG/DL (ref 1.6–2.4)
MAGNESIUM SERPL-MCNC: 2.7 MG/DL (ref 1.6–2.4)
MAGNESIUM SERPL-MCNC: 3.5 MG/DL (ref 1.6–2.4)
MCH RBC QN AUTO: 30.6 PG (ref 26–34)
MCH RBC QN AUTO: 30.7 PG (ref 26–34)
MCH RBC QN AUTO: 31.2 PG (ref 26–34)
MCH RBC QN AUTO: 31.3 PG (ref 26–34)
MCH RBC QN AUTO: 31.3 PG (ref 26–34)
MCH RBC QN AUTO: 31.5 PG (ref 26–34)
MCH RBC QN AUTO: 31.6 PG (ref 26–34)
MCH RBC QN AUTO: 31.6 PG (ref 26–34)
MCH RBC QN AUTO: 31.8 PG (ref 26–34)
MCH RBC QN AUTO: 32.2 PG (ref 26–34)
MCHC RBC AUTO-ENTMCNC: 30.1 G/DL (ref 30–36.5)
MCHC RBC AUTO-ENTMCNC: 30.2 G/DL (ref 30–36.5)
MCHC RBC AUTO-ENTMCNC: 30.8 G/DL (ref 30–36.5)
MCHC RBC AUTO-ENTMCNC: 31.3 G/DL (ref 30–36.5)
MCHC RBC AUTO-ENTMCNC: 31.9 G/DL (ref 30–36.5)
MCHC RBC AUTO-ENTMCNC: 32.1 G/DL (ref 30–36.5)
MCHC RBC AUTO-ENTMCNC: 32.3 G/DL (ref 30–36.5)
MCHC RBC AUTO-ENTMCNC: 32.4 G/DL (ref 30–36.5)
MCHC RBC AUTO-ENTMCNC: 32.6 G/DL (ref 30–36.5)
MCHC RBC AUTO-ENTMCNC: 33 G/DL (ref 30–36.5)
MCHC RBC AUTO-ENTMCNC: 33.2 G/DL (ref 30–36.5)
MCHC RBC AUTO-ENTMCNC: 33.3 G/DL (ref 30–36.5)
MCV RBC AUTO: 102.7 FL (ref 80–99)
MCV RBC AUTO: 103.7 FL (ref 80–99)
MCV RBC AUTO: 107 FL (ref 80–99)
MCV RBC AUTO: 93.3 FL (ref 80–99)
MCV RBC AUTO: 94.2 FL (ref 80–99)
MCV RBC AUTO: 95.4 FL (ref 80–99)
MCV RBC AUTO: 96.4 FL (ref 80–99)
MCV RBC AUTO: 96.5 FL (ref 80–99)
MCV RBC AUTO: 97.6 FL (ref 80–99)
MCV RBC AUTO: 97.8 FL (ref 80–99)
MCV RBC AUTO: 98.4 FL (ref 80–99)
MCV RBC AUTO: 98.9 FL (ref 80–99)
METHGB MFR BLD: 0.4 % (ref 0–1.4)
MICROALBUMIN UR-MCNC: 3.25 MG/DL
MICROALBUMIN/CREAT UR-RTO: 32 MG/G (ref 0–30)
MINUTES UNTIL NEXT BG, NBG: 120 MIN
MINUTES UNTIL NEXT BG, NBG: 60 MIN
MONOCYTES # BLD: 0.6 K/UL (ref 0–1)
MONOCYTES # BLD: 1 K/UL (ref 0–1)
MONOCYTES # BLD: 1.2 K/UL (ref 0–1)
MONOCYTES # BLD: 1.2 K/UL (ref 0–1)
MONOCYTES # BLD: 1.4 K/UL (ref 0–1)
MONOCYTES # BLD: 1.6 K/UL (ref 0–1)
MONOCYTES NFR BLD: 10 % (ref 5–13)
MONOCYTES NFR BLD: 10 % (ref 5–13)
MONOCYTES NFR BLD: 12 % (ref 5–13)
MONOCYTES NFR BLD: 7 % (ref 5–13)
MONOCYTES NFR BLD: 9 % (ref 5–13)
MONOCYTES NFR BLD: 9 % (ref 5–13)
MUCOUS THREADS URNS QL MICRO: ABNORMAL /LPF
MULTIPLIER, MUL: 0.03
MULTIPLIER, MUL: 0.03
MULTIPLIER, MUL: 0.04
MULTIPLIER, MUL: 0.05
MULTIPLIER, MUL: 0.06
MULTIPLIER, MUL: 0.07
MULTIPLIER, MUL: 0.08
MULTIPLIER, MUL: 0.09
MULTIPLIER, MUL: 0.1
MULTIPLIER, MUL: 0.1
MULTIPLIER, MUL: 0.11
NEUTS SEG # BLD: 14 K/UL (ref 1.8–8)
NEUTS SEG # BLD: 18.1 K/UL (ref 1.8–8)
NEUTS SEG # BLD: 4.4 K/UL (ref 1.8–8)
NEUTS SEG # BLD: 6.3 K/UL (ref 1.8–8)
NEUTS SEG # BLD: 7.9 K/UL (ref 1.8–8)
NEUTS SEG # BLD: 9.1 K/UL (ref 1.8–8)
NEUTS SEG NFR BLD: 64 % (ref 32–75)
NEUTS SEG NFR BLD: 71 % (ref 32–75)
NEUTS SEG NFR BLD: 74 % (ref 32–75)
NEUTS SEG NFR BLD: 78 % (ref 32–75)
NEUTS SEG NFR BLD: 80 % (ref 32–75)
NEUTS SEG NFR BLD: 86 % (ref 32–75)
NITRITE UR QL STRIP.AUTO: NEGATIVE
NITRITE UR QL STRIP.AUTO: NEGATIVE
NITRITE UR QL STRIP.AUTO: POSITIVE
NRBC # BLD: 0 K/UL (ref 0–0.01)
NRBC # BLD: 0.02 K/UL (ref 0–0.01)
NRBC # BLD: 0.03 K/UL (ref 0–0.01)
NRBC BLD-RTO: 0 PER 100 WBC
NRBC BLD-RTO: 0.1 PER 100 WBC
NRBC BLD-RTO: 0.2 PER 100 WBC
O2/TOTAL GAS SETTING VFR VENT: 50 %
ORDER INITIALS, ORDINIT: NORMAL
OXYHGB MFR BLD: 50.5 % (ref 94–97)
P-R INTERVAL, ECG05: 136 MS
P-R INTERVAL, ECG05: 190 MS
PCO2 BLD: 30.4 MMHG (ref 35–45)
PCO2 BLD: 36.4 MMHG (ref 35–45)
PCO2 BLDA: 25 MMHG (ref 35–45)
PCO2 BLDA: 28 MMHG (ref 35–45)
PCO2 BLDA: 30 MMHG (ref 35–45)
PCO2 BLDA: 31 MMHG (ref 35–45)
PCO2 BLDA: 37 MMHG (ref 35–45)
PCO2 BLDA: 38 MMHG (ref 35–45)
PCO2 BLDA: 38 MMHG (ref 35–45)
PCO2 BLDA: 43 MMHG (ref 35–45)
PEEP RESPIRATORY: 5 CMH2O
PEEP RESPIRATORY: 5 CM[H2O]
PEEP RESPIRATORY: 5 CM[H2O]
PH BLD: 7.38 [PH] (ref 7.35–7.45)
PH BLD: 7.46 [PH] (ref 7.35–7.45)
PH BLDA: 7.23 [PH] (ref 7.35–7.45)
PH BLDA: 7.28 [PH] (ref 7.35–7.45)
PH BLDA: 7.31 [PH] (ref 7.35–7.45)
PH BLDA: 7.34 [PH] (ref 7.35–7.45)
PH BLDA: 7.42 [PH] (ref 7.35–7.45)
PH BLDA: 7.46 [PH] (ref 7.35–7.45)
PH BLDA: 7.51 [PH] (ref 7.35–7.45)
PH BLDA: 7.52 [PH] (ref 7.35–7.45)
PH UR STRIP: 5 [PH] (ref 5–8)
PHOSPHATE SERPL-MCNC: 6.8 MG/DL (ref 2.6–4.7)
PLATELET # BLD AUTO: 118 K/UL (ref 150–400)
PLATELET # BLD AUTO: 124 K/UL (ref 150–400)
PLATELET # BLD AUTO: 137 K/UL (ref 150–400)
PLATELET # BLD AUTO: 153 K/UL (ref 150–400)
PLATELET # BLD AUTO: 162 K/UL (ref 150–400)
PLATELET # BLD AUTO: 177 K/UL (ref 150–400)
PLATELET # BLD AUTO: 195 K/UL (ref 150–400)
PLATELET # BLD AUTO: 212 K/UL (ref 150–400)
PLATELET # BLD AUTO: 231 K/UL (ref 150–400)
PLATELET # BLD AUTO: 232 K/UL (ref 150–400)
PLATELET # BLD AUTO: 233 K/UL (ref 150–400)
PLATELET # BLD AUTO: 236 K/UL (ref 150–400)
PMV BLD AUTO: 10 FL (ref 8.9–12.9)
PMV BLD AUTO: 10 FL (ref 8.9–12.9)
PMV BLD AUTO: 10.2 FL (ref 8.9–12.9)
PMV BLD AUTO: 10.2 FL (ref 8.9–12.9)
PMV BLD AUTO: 10.3 FL (ref 8.9–12.9)
PMV BLD AUTO: 10.4 FL (ref 8.9–12.9)
PMV BLD AUTO: 10.7 FL (ref 8.9–12.9)
PMV BLD AUTO: 10.7 FL (ref 8.9–12.9)
PMV BLD AUTO: 10.8 FL (ref 8.9–12.9)
PMV BLD AUTO: 10.9 FL (ref 8.9–12.9)
PMV BLD AUTO: 11.6 FL (ref 8.9–12.9)
PMV BLD AUTO: 11.6 FL (ref 8.9–12.9)
PO2 BLD: 111 MMHG (ref 80–100)
PO2 BLD: 82 MMHG (ref 80–100)
PO2 BLDA: 110 MMHG (ref 80–100)
PO2 BLDA: 126 MMHG (ref 80–100)
PO2 BLDA: 129 MMHG (ref 80–100)
PO2 BLDA: 130 MMHG (ref 80–100)
PO2 BLDA: 216 MMHG (ref 80–100)
PO2 BLDA: 79 MMHG (ref 80–100)
PO2 BLDA: 80 MMHG (ref 80–100)
PO2 BLDA: 96 MMHG (ref 80–100)
POTASSIUM SERPL-SCNC: 3.2 MMOL/L (ref 3.5–5.1)
POTASSIUM SERPL-SCNC: 3.4 MMOL/L (ref 3.5–5.1)
POTASSIUM SERPL-SCNC: 3.5 MMOL/L (ref 3.5–5.1)
POTASSIUM SERPL-SCNC: 3.5 MMOL/L (ref 3.5–5.1)
POTASSIUM SERPL-SCNC: 3.7 MMOL/L (ref 3.5–5.1)
POTASSIUM SERPL-SCNC: 3.7 MMOL/L (ref 3.5–5.1)
POTASSIUM SERPL-SCNC: 3.9 MMOL/L (ref 3.5–5.1)
POTASSIUM SERPL-SCNC: 4 MMOL/L (ref 3.5–5.1)
POTASSIUM SERPL-SCNC: 4.1 MMOL/L (ref 3.5–5.1)
POTASSIUM SERPL-SCNC: 4.2 MMOL/L (ref 3.5–5.1)
POTASSIUM SERPL-SCNC: 4.4 MMOL/L (ref 3.5–5.1)
POTASSIUM SERPL-SCNC: 5.2 MMOL/L (ref 3.5–5.1)
PRESSURE SUPPORT SETTING VENT: 8 CM[H2O]
PROT SERPL-MCNC: 5.4 G/DL (ref 6.4–8.2)
PROT SERPL-MCNC: 5.6 G/DL (ref 6.4–8.2)
PROT SERPL-MCNC: 5.7 G/DL (ref 6.4–8.2)
PROT SERPL-MCNC: 6.1 G/DL (ref 6.4–8.2)
PROT SERPL-MCNC: 6.7 G/DL (ref 6.4–8.2)
PROT SERPL-MCNC: 7.1 G/DL (ref 6.4–8.2)
PROT SERPL-MCNC: 7.1 G/DL (ref 6.4–8.2)
PROT SERPL-MCNC: 7.4 G/DL (ref 6.4–8.2)
PROT SERPL-MCNC: 7.7 G/DL (ref 6.4–8.2)
PROT SERPL-MCNC: 8 G/DL (ref 6.4–8.2)
PROT UR STRIP-MCNC: NEGATIVE MG/DL
PROTHROMBIN TIME: 13.2 SEC (ref 9–11.1)
PROTHROMBIN TIME: 13.5 SEC (ref 9–11.1)
Q-T INTERVAL, ECG07: 344 MS
Q-T INTERVAL, ECG07: 346 MS
Q-T INTERVAL, ECG07: 396 MS
Q-T INTERVAL, ECG07: 476 MS
QRS DURATION, ECG06: 102 MS
QRS DURATION, ECG06: 108 MS
QRS DURATION, ECG06: 150 MS
QRS DURATION, ECG06: 98 MS
QTC CALCULATION (BEZET), ECG08: 459 MS
QTC CALCULATION (BEZET), ECG08: 479 MS
QTC CALCULATION (BEZET), ECG08: 505 MS
QTC CALCULATION (BEZET), ECG08: 513 MS
RBC # BLD AUTO: 1.15 M/UL (ref 3.8–5.2)
RBC # BLD AUTO: 1.34 M/UL (ref 3.8–5.2)
RBC # BLD AUTO: 2.75 M/UL (ref 3.8–5.2)
RBC # BLD AUTO: 2.76 M/UL (ref 3.8–5.2)
RBC # BLD AUTO: 3.04 M/UL (ref 3.8–5.2)
RBC # BLD AUTO: 3.05 M/UL (ref 3.8–5.2)
RBC # BLD AUTO: 3.66 M/UL (ref 3.8–5.2)
RBC # BLD AUTO: 3.71 M/UL (ref 3.8–5.2)
RBC # BLD AUTO: 3.72 M/UL (ref 3.8–5.2)
RBC # BLD AUTO: 3.73 M/UL (ref 3.8–5.2)
RBC # BLD AUTO: 3.91 M/UL (ref 3.8–5.2)
RBC # BLD AUTO: 4.75 M/UL (ref 3.8–5.2)
RBC #/AREA URNS HPF: ABNORMAL /HPF (ref 0–5)
RBC MORPH BLD: ABNORMAL
RIGHT CCA DIST DIAS: 15 CM/S
RIGHT CCA DIST SYS: 54.8 CM/S
RIGHT CCA PROX DIAS: 18 CM/S
RIGHT CCA PROX SYS: 65 CM/S
RIGHT ECA DIAS: 9.39 CM/S
RIGHT ECA SYS: 49.8 CM/S
RIGHT ICA DIST DIAS: 18.9 CM/S
RIGHT ICA DIST SYS: 68.8 CM/S
RIGHT ICA MID DIAS: 15.8 CM/S
RIGHT ICA MID SYS: 47.5 CM/S
RIGHT ICA PROX DIAS: 11.2 CM/S
RIGHT ICA PROX SYS: 36.9 CM/S
RIGHT ICA/CCA SYS: 1.3
RIGHT SUBCLAVIAN DIAS: 0 CM/S
RIGHT SUBCLAVIAN SYS: 121.4 CM/S
RIGHT VERTEBRAL DIAS: 16.52 CM/S
RIGHT VERTEBRAL SYS: 75.9 CM/S
SAO2 % BLD: 51 % (ref 95–99)
SAO2 % BLD: 95 % (ref 92–97)
SAO2 % BLD: 95.9 % (ref 92–97)
SAO2 % BLD: 96 % (ref 92–97)
SAO2 % BLD: 98 % (ref 92–97)
SAO2 % BLD: 98.6 % (ref 92–97)
SAO2 % BLD: 99 % (ref 92–97)
SAO2% DEVICE SAO2% SENSOR NAME: ABNORMAL
SARS-COV-2, COV2: NORMAL
SARS-COV-2, COV2NT: NOT DETECTED
SARS-COV-2, NAA: NOT DETECTED
SERVICE CMNT-IMP: ABNORMAL
SERVICE CMNT-IMP: NORMAL
SODIUM SERPL-SCNC: 133 MMOL/L (ref 136–145)
SODIUM SERPL-SCNC: 134 MMOL/L (ref 136–145)
SODIUM SERPL-SCNC: 136 MMOL/L (ref 136–145)
SODIUM SERPL-SCNC: 138 MMOL/L (ref 136–145)
SODIUM SERPL-SCNC: 139 MMOL/L (ref 136–145)
SODIUM SERPL-SCNC: 140 MMOL/L (ref 136–145)
SODIUM SERPL-SCNC: 143 MMOL/L (ref 136–145)
SODIUM SERPL-SCNC: 143 MMOL/L (ref 136–145)
SODIUM SERPL-SCNC: 144 MMOL/L (ref 136–145)
SODIUM SERPL-SCNC: 144 MMOL/L (ref 136–145)
SODIUM SERPL-SCNC: 146 MMOL/L (ref 136–145)
SODIUM SERPL-SCNC: 146 MMOL/L (ref 136–145)
SOURCE, COVRS: NORMAL
SP GR UR REFRACTOMETRY: 1.01 (ref 1–1.03)
SP GR UR REFRACTOMETRY: 1.01 (ref 1–1.03)
SP GR UR REFRACTOMETRY: 1.02 (ref 1–1.03)
SPECIMEN SITE: ABNORMAL
SPECIMEN TYPE: ABNORMAL
SPECIMEN TYPE: ABNORMAL
THERAPEUTIC RANGE,PTTT: ABNORMAL SECS (ref 58–77)
THERAPEUTIC RANGE,PTTT: ABNORMAL SECS (ref 58–77)
THERAPEUTIC RANGE,PTTT: NORMAL SECS (ref 58–77)
TIBC SERPL-MCNC: 166 UG/DL (ref 250–450)
TRIGL SERPL-MCNC: 98 MG/DL (ref ?–150)
TROPONIN I SERPL-MCNC: 14.9 NG/ML
TROPONIN I SERPL-MCNC: <0.05 NG/ML
TSH SERPL DL<=0.05 MIU/L-ACNC: 6.52 UIU/ML (ref 0.36–3.74)
UA: UC IF INDICATED,UAUC: ABNORMAL
UROBILINOGEN UR QL STRIP.AUTO: 0.2 EU/DL (ref 0.2–1)
VENTILATION MODE VENT: ABNORMAL
VENTRICULAR RATE, ECG03: 101 BPM
VENTRICULAR RATE, ECG03: 117 BPM
VENTRICULAR RATE, ECG03: 128 BPM
VENTRICULAR RATE, ECG03: 56 BPM
VLDLC SERPL CALC-MCNC: 19.6 MG/DL
VT SETTING VENT: 350 ML
VT SETTING VENT: 550 ML
WBC # BLD AUTO: 11.1 K/UL (ref 3.6–11)
WBC # BLD AUTO: 11.3 K/UL (ref 3.6–11)
WBC # BLD AUTO: 11.4 K/UL (ref 3.6–11)
WBC # BLD AUTO: 16.1 K/UL (ref 3.6–11)
WBC # BLD AUTO: 18 K/UL (ref 3.6–11)
WBC # BLD AUTO: 18 K/UL (ref 3.6–11)
WBC # BLD AUTO: 20.9 K/UL (ref 3.6–11)
WBC # BLD AUTO: 6 K/UL (ref 3.6–11)
WBC # BLD AUTO: 8.4 K/UL (ref 3.6–11)
WBC # BLD AUTO: 9.3 K/UL (ref 3.6–11)
WBC # BLD AUTO: 9.4 K/UL (ref 3.6–11)
WBC # BLD AUTO: 9.8 K/UL (ref 3.6–11)
WBC URNS QL MICRO: ABNORMAL /HPF (ref 0–4)

## 2021-01-01 PROCEDURE — 83880 ASSAY OF NATRIURETIC PEPTIDE: CPT

## 2021-01-01 PROCEDURE — 33430 REPLACEMENT OF MITRAL VALVE: CPT | Performed by: THORACIC SURGERY (CARDIOTHORACIC VASCULAR SURGERY)

## 2021-01-01 PROCEDURE — 77030011255 HC DSG AQUACEL AG BMS -A: Performed by: THORACIC SURGERY (CARDIOTHORACIC VASCULAR SURGERY)

## 2021-01-01 PROCEDURE — C1751 CATH, INF, PER/CENT/MIDLINE: HCPCS | Performed by: INTERNAL MEDICINE

## 2021-01-01 PROCEDURE — 77030012390 HC DRN CHST BTL GTNG -B: Performed by: THORACIC SURGERY (CARDIOTHORACIC VASCULAR SURGERY)

## 2021-01-01 PROCEDURE — 74011250637 HC RX REV CODE- 250/637: Performed by: ANESTHESIOLOGY

## 2021-01-01 PROCEDURE — 99152 MOD SED SAME PHYS/QHP 5/>YRS: CPT | Performed by: INTERNAL MEDICINE

## 2021-01-01 PROCEDURE — 76010000120 HC CV SURG 7 TO 7.5 HR: Performed by: THORACIC SURGERY (CARDIOTHORACIC VASCULAR SURGERY)

## 2021-01-01 PROCEDURE — 77030003623 HC NDL PERC VASC TELE -C: Performed by: INTERNAL MEDICINE

## 2021-01-01 PROCEDURE — 81001 URINALYSIS AUTO W/SCOPE: CPT

## 2021-01-01 PROCEDURE — 74011250637 HC RX REV CODE- 250/637: Performed by: INTERNAL MEDICINE

## 2021-01-01 PROCEDURE — 74011250636 HC RX REV CODE- 250/636: Performed by: INTERNAL MEDICINE

## 2021-01-01 PROCEDURE — 74011000258 HC RX REV CODE- 258: Performed by: PHYSICIAN ASSISTANT

## 2021-01-01 PROCEDURE — 80053 COMPREHEN METABOLIC PANEL: CPT

## 2021-01-01 PROCEDURE — 02L70CK OCCLUSION OF LEFT ATRIAL APPENDAGE WITH EXTRALUMINAL DEVICE, OPEN APPROACH: ICD-10-PCS | Performed by: THORACIC SURGERY (CARDIOTHORACIC VASCULAR SURGERY)

## 2021-01-01 PROCEDURE — 77030041933 HC CLP ATRI LAA EXCL FLX ATRC -H: Performed by: THORACIC SURGERY (CARDIOTHORACIC VASCULAR SURGERY)

## 2021-01-01 PROCEDURE — 99231 SBSQ HOSP IP/OBS SF/LOW 25: CPT | Performed by: THORACIC SURGERY (CARDIOTHORACIC VASCULAR SURGERY)

## 2021-01-01 PROCEDURE — P9045 ALBUMIN (HUMAN), 5%, 250 ML: HCPCS | Performed by: INTERNAL MEDICINE

## 2021-01-01 PROCEDURE — 5A12012 PERFORMANCE OF CARDIAC OUTPUT, SINGLE, MANUAL: ICD-10-PCS | Performed by: THORACIC SURGERY (CARDIOTHORACIC VASCULAR SURGERY)

## 2021-01-01 PROCEDURE — 85025 COMPLETE CBC W/AUTO DIFF WBC: CPT

## 2021-01-01 PROCEDURE — 87086 URINE CULTURE/COLONY COUNT: CPT

## 2021-01-01 PROCEDURE — 74011000250 HC RX REV CODE- 250: Performed by: INTERNAL MEDICINE

## 2021-01-01 PROCEDURE — 74011250636 HC RX REV CODE- 250/636: Performed by: NURSE ANESTHETIST, CERTIFIED REGISTERED

## 2021-01-01 PROCEDURE — 92928 PRQ TCAT PLMT NTRAC ST 1 LES: CPT | Performed by: INTERNAL MEDICINE

## 2021-01-01 PROCEDURE — C1769 GUIDE WIRE: HCPCS | Performed by: INTERNAL MEDICINE

## 2021-01-01 PROCEDURE — 77030002986 HC SUT PROL J&J -A: Performed by: THORACIC SURGERY (CARDIOTHORACIC VASCULAR SURGERY)

## 2021-01-01 PROCEDURE — 96374 THER/PROPH/DIAG INJ IV PUSH: CPT

## 2021-01-01 PROCEDURE — P9045 ALBUMIN (HUMAN), 5%, 250 ML: HCPCS | Performed by: NURSE ANESTHETIST, CERTIFIED REGISTERED

## 2021-01-01 PROCEDURE — 5A0221D ASSISTANCE WITH CARDIAC OUTPUT USING IMPELLER PUMP, CONTINUOUS: ICD-10-PCS | Performed by: INTERNAL MEDICINE

## 2021-01-01 PROCEDURE — 85027 COMPLETE CBC AUTOMATED: CPT

## 2021-01-01 PROCEDURE — C1751 CATH, INF, PER/CENT/MIDLINE: HCPCS | Performed by: NURSE ANESTHETIST, CERTIFIED REGISTERED

## 2021-01-01 PROCEDURE — 77030010821: Performed by: THORACIC SURGERY (CARDIOTHORACIC VASCULAR SURGERY)

## 2021-01-01 PROCEDURE — 92950 HEART/LUNG RESUSCITATION CPR: CPT

## 2021-01-01 PROCEDURE — 65660000000 HC RM CCU STEPDOWN

## 2021-01-01 PROCEDURE — 77030014491 HC PLEDG PTFE BARD -A: Performed by: THORACIC SURGERY (CARDIOTHORACIC VASCULAR SURGERY)

## 2021-01-01 PROCEDURE — 02RG08Z REPLACEMENT OF MITRAL VALVE WITH ZOOPLASTIC TISSUE, OPEN APPROACH: ICD-10-PCS | Performed by: THORACIC SURGERY (CARDIOTHORACIC VASCULAR SURGERY)

## 2021-01-01 PROCEDURE — 71045 X-RAY EXAM CHEST 1 VIEW: CPT

## 2021-01-01 PROCEDURE — 77030041076 HC DRSG AG OPTICELL MDII -A: Performed by: THORACIC SURGERY (CARDIOTHORACIC VASCULAR SURGERY)

## 2021-01-01 PROCEDURE — 77030006920 HC BLD STRNL SAW STRY -B: Performed by: THORACIC SURGERY (CARDIOTHORACIC VASCULAR SURGERY)

## 2021-01-01 PROCEDURE — 86923 COMPATIBILITY TEST ELECTRIC: CPT

## 2021-01-01 PROCEDURE — 77030013079 HC BLNKT BAIR HGGR 3M -A: Performed by: NURSE ANESTHETIST, CERTIFIED REGISTERED

## 2021-01-01 PROCEDURE — 77030013797 HC KT TRNSDUC PRSSR EDWD -A: Performed by: INTERNAL MEDICINE

## 2021-01-01 PROCEDURE — 77030029641 HC PMP CARD PERC IMPELLA CP ABIM -L: Performed by: INTERNAL MEDICINE

## 2021-01-01 PROCEDURE — 74011250637 HC RX REV CODE- 250/637: Performed by: PHYSICIAN ASSISTANT

## 2021-01-01 PROCEDURE — 77030028837 HC SYR ANGI PWR INJ COEU -A: Performed by: INTERNAL MEDICINE

## 2021-01-01 PROCEDURE — 65620000000 HC RM CCU GENERAL

## 2021-01-01 PROCEDURE — C1894 INTRO/SHEATH, NON-LASER: HCPCS | Performed by: INTERNAL MEDICINE

## 2021-01-01 PROCEDURE — 71250 CT THORAX DX C-: CPT

## 2021-01-01 PROCEDURE — 77030003029 HC SUT VCRL J&J -B: Performed by: THORACIC SURGERY (CARDIOTHORACIC VASCULAR SURGERY)

## 2021-01-01 PROCEDURE — 33999 UNLISTED PX CARDIAC SURGERY: CPT | Performed by: INTERNAL MEDICINE

## 2021-01-01 PROCEDURE — 82962 GLUCOSE BLOOD TEST: CPT

## 2021-01-01 PROCEDURE — 77030019908 HC STETH ESOPH SIMS -A: Performed by: NURSE ANESTHETIST, CERTIFIED REGISTERED

## 2021-01-01 PROCEDURE — 94003 VENT MGMT INPAT SUBQ DAY: CPT

## 2021-01-01 PROCEDURE — 82375 ASSAY CARBOXYHB QUANT: CPT

## 2021-01-01 PROCEDURE — 74011000258 HC RX REV CODE- 258: Performed by: NURSE PRACTITIONER

## 2021-01-01 PROCEDURE — 74011000250 HC RX REV CODE- 250: Performed by: PHYSICIAN ASSISTANT

## 2021-01-01 PROCEDURE — 77030008684 HC TU ET CUF COVD -B: Performed by: NURSE ANESTHETIST, CERTIFIED REGISTERED

## 2021-01-01 PROCEDURE — 36415 COLL VENOUS BLD VENIPUNCTURE: CPT

## 2021-01-01 PROCEDURE — 74011250636 HC RX REV CODE- 250/636: Performed by: PHYSICIAN ASSISTANT

## 2021-01-01 PROCEDURE — 85610 PROTHROMBIN TIME: CPT

## 2021-01-01 PROCEDURE — 93005 ELECTROCARDIOGRAM TRACING: CPT

## 2021-01-01 PROCEDURE — 84100 ASSAY OF PHOSPHORUS: CPT

## 2021-01-01 PROCEDURE — 76060000045 HC ANESTHESIA 7 TO 7.5 HR: Performed by: THORACIC SURGERY (CARDIOTHORACIC VASCULAR SURGERY)

## 2021-01-01 PROCEDURE — 77030020263 HC SOL INJ SOD CL0.9% LFCR 1000ML: Performed by: THORACIC SURGERY (CARDIOTHORACIC VASCULAR SURGERY)

## 2021-01-01 PROCEDURE — 77030020061 HC IV BLD WRMR ADMIN SET 3M -B: Performed by: NURSE ANESTHETIST, CERTIFIED REGISTERED

## 2021-01-01 PROCEDURE — 77030014008 HC SPNG HEMSTAT J&J -C: Performed by: THORACIC SURGERY (CARDIOTHORACIC VASCULAR SURGERY)

## 2021-01-01 PROCEDURE — C1751 CATH, INF, PER/CENT/MIDLINE: HCPCS | Performed by: THORACIC SURGERY (CARDIOTHORACIC VASCULAR SURGERY)

## 2021-01-01 PROCEDURE — 99284 EMERGENCY DEPT VISIT MOD MDM: CPT

## 2021-01-01 PROCEDURE — 74011000250 HC RX REV CODE- 250: Performed by: THORACIC SURGERY (CARDIOTHORACIC VASCULAR SURGERY)

## 2021-01-01 PROCEDURE — U0003 INFECTIOUS AGENT DETECTION BY NUCLEIC ACID (DNA OR RNA); SEVERE ACUTE RESPIRATORY SYNDROME CORONAVIRUS 2 (SARS-COV-2) (CORONAVIRUS DISEASE [COVID-19]), AMPLIFIED PROBE TECHNIQUE, MAKING USE OF HIGH THROUGHPUT TECHNOLOGIES AS DESCRIBED BY CMS-2020-01-R: HCPCS

## 2021-01-01 PROCEDURE — 77030013861 HC PNCH AORT CLNCUT QUES -B: Performed by: THORACIC SURGERY (CARDIOTHORACIC VASCULAR SURGERY)

## 2021-01-01 PROCEDURE — 83735 ASSAY OF MAGNESIUM: CPT

## 2021-01-01 PROCEDURE — 74011000250 HC RX REV CODE- 250: Performed by: NURSE ANESTHETIST, CERTIFIED REGISTERED

## 2021-01-01 PROCEDURE — 77010033678 HC OXYGEN DAILY

## 2021-01-01 PROCEDURE — 77030005513 HC CATH URETH FOL11 MDII -B: Performed by: THORACIC SURGERY (CARDIOTHORACIC VASCULAR SURGERY)

## 2021-01-01 PROCEDURE — 74011000258 HC RX REV CODE- 258: Performed by: INTERNAL MEDICINE

## 2021-01-01 PROCEDURE — 77030020167 HC PERF SET MULT MEDT -B: Performed by: THORACIC SURGERY (CARDIOTHORACIC VASCULAR SURGERY)

## 2021-01-01 PROCEDURE — 87635 SARS-COV-2 COVID-19 AMP PRB: CPT

## 2021-01-01 PROCEDURE — 77030002996 HC SUT SLK J&J -A: Performed by: THORACIC SURGERY (CARDIOTHORACIC VASCULAR SURGERY)

## 2021-01-01 PROCEDURE — 2709999900 HC NON-CHARGEABLE SUPPLY: Performed by: THORACIC SURGERY (CARDIOTHORACIC VASCULAR SURGERY)

## 2021-01-01 PROCEDURE — 94640 AIRWAY INHALATION TREATMENT: CPT

## 2021-01-01 PROCEDURE — B24BZZ4 ULTRASONOGRAPHY OF HEART WITH AORTA, TRANSESOPHAGEAL: ICD-10-PCS | Performed by: ANESTHESIOLOGY

## 2021-01-01 PROCEDURE — 77030003010 HC SUT SURG STL J&J -B: Performed by: THORACIC SURGERY (CARDIOTHORACIC VASCULAR SURGERY)

## 2021-01-01 PROCEDURE — 77030013884 HC PRB ELECSRG BPLR ATRC -I: Performed by: THORACIC SURGERY (CARDIOTHORACIC VASCULAR SURGERY)

## 2021-01-01 PROCEDURE — 85347 COAGULATION TIME ACTIVATED: CPT

## 2021-01-01 PROCEDURE — 4A023N8 MEASUREMENT OF CARDIAC SAMPLING AND PRESSURE, BILATERAL, PERCUTANEOUS APPROACH: ICD-10-PCS | Performed by: INTERNAL MEDICINE

## 2021-01-01 PROCEDURE — C1887 CATHETER, GUIDING: HCPCS | Performed by: INTERNAL MEDICINE

## 2021-01-01 PROCEDURE — 94002 VENT MGMT INPAT INIT DAY: CPT

## 2021-01-01 PROCEDURE — 83036 HEMOGLOBIN GLYCOSYLATED A1C: CPT

## 2021-01-01 PROCEDURE — 99153 MOD SED SAME PHYS/QHP EA: CPT | Performed by: INTERNAL MEDICINE

## 2021-01-01 PROCEDURE — 77030010813: Performed by: THORACIC SURGERY (CARDIOTHORACIC VASCULAR SURGERY)

## 2021-01-01 PROCEDURE — 80051 ELECTROLYTE PANEL: CPT

## 2021-01-01 PROCEDURE — C1725 CATH, TRANSLUMIN NON-LASER: HCPCS | Performed by: INTERNAL MEDICINE

## 2021-01-01 PROCEDURE — 93458 L HRT ARTERY/VENTRICLE ANGIO: CPT | Performed by: INTERNAL MEDICINE

## 2021-01-01 PROCEDURE — 80076 HEPATIC FUNCTION PANEL: CPT

## 2021-01-01 PROCEDURE — 77030002996 HC SUT SLK J&J -A: Performed by: INTERNAL MEDICINE

## 2021-01-01 PROCEDURE — 77030010605 HC BLWR MR MAL MEDT -B: Performed by: THORACIC SURGERY (CARDIOTHORACIC VASCULAR SURGERY)

## 2021-01-01 PROCEDURE — 80048 BASIC METABOLIC PNL TOTAL CA: CPT

## 2021-01-01 PROCEDURE — 77030002987 HC SUT PROL J&J -B: Performed by: THORACIC SURGERY (CARDIOTHORACIC VASCULAR SURGERY)

## 2021-01-01 PROCEDURE — 99443 PR PHYS/QHP TELEPHONE EVALUATION 21-30 MIN: CPT | Performed by: FAMILY MEDICINE

## 2021-01-01 PROCEDURE — 85730 THROMBOPLASTIN TIME PARTIAL: CPT

## 2021-01-01 PROCEDURE — 93880 EXTRACRANIAL BILAT STUDY: CPT | Performed by: PSYCHIATRY & NEUROLOGY

## 2021-01-01 PROCEDURE — 74011250636 HC RX REV CODE- 250/636

## 2021-01-01 PROCEDURE — 4A023N7 MEASUREMENT OF CARDIAC SAMPLING AND PRESSURE, LEFT HEART, PERCUTANEOUS APPROACH: ICD-10-PCS | Performed by: INTERNAL MEDICINE

## 2021-01-01 PROCEDURE — 99232 SBSQ HOSP IP/OBS MODERATE 35: CPT | Performed by: THORACIC SURGERY (CARDIOTHORACIC VASCULAR SURGERY)

## 2021-01-01 PROCEDURE — 36600 WITHDRAWAL OF ARTERIAL BLOOD: CPT

## 2021-01-01 PROCEDURE — 2709999900 HC NON-CHARGEABLE SUPPLY

## 2021-01-01 PROCEDURE — 4A033BC MEASUREMENT OF ARTERIAL PRESSURE, CORONARY, PERCUTANEOUS APPROACH: ICD-10-PCS | Performed by: INTERNAL MEDICINE

## 2021-01-01 PROCEDURE — B2181ZZ FLUOROSCOPY OF LEFT INTERNAL MAMMARY BYPASS GRAFT USING LOW OSMOLAR CONTRAST: ICD-10-PCS | Performed by: INTERNAL MEDICINE

## 2021-01-01 PROCEDURE — 74011000250 HC RX REV CODE- 250: Performed by: NURSE PRACTITIONER

## 2021-01-01 PROCEDURE — B2101ZZ FLUOROSCOPY OF SINGLE CORONARY ARTERY USING LOW OSMOLAR CONTRAST: ICD-10-PCS | Performed by: INTERNAL MEDICINE

## 2021-01-01 PROCEDURE — 77030040504 HC DRN WND MDII -B: Performed by: THORACIC SURGERY (CARDIOTHORACIC VASCULAR SURGERY)

## 2021-01-01 PROCEDURE — 74011250636 HC RX REV CODE- 250/636: Performed by: THORACIC SURGERY (CARDIOTHORACIC VASCULAR SURGERY)

## 2021-01-01 PROCEDURE — 77030018729 HC ELECTRD DEFIB PAD CARD -B: Performed by: THORACIC SURGERY (CARDIOTHORACIC VASCULAR SURGERY)

## 2021-01-01 PROCEDURE — 77030010823: Performed by: THORACIC SURGERY (CARDIOTHORACIC VASCULAR SURGERY)

## 2021-01-01 PROCEDURE — 74011000258 HC RX REV CODE- 258: Performed by: THORACIC SURGERY (CARDIOTHORACIC VASCULAR SURGERY)

## 2021-01-01 PROCEDURE — 77030002913 HC SUT ETHBND J&J -B: Performed by: THORACIC SURGERY (CARDIOTHORACIC VASCULAR SURGERY)

## 2021-01-01 PROCEDURE — 02HA3RZ INSERTION OF SHORT-TERM EXTERNAL HEART ASSIST SYSTEM INTO HEART, PERCUTANEOUS APPROACH: ICD-10-PCS | Performed by: INTERNAL MEDICINE

## 2021-01-01 PROCEDURE — 77030018729 HC ELECTRD DEFIB PAD CARD -B: Performed by: INTERNAL MEDICINE

## 2021-01-01 PROCEDURE — 74011250637 HC RX REV CODE- 250/637: Performed by: THORACIC SURGERY (CARDIOTHORACIC VASCULAR SURGERY)

## 2021-01-01 PROCEDURE — 82803 BLOOD GASES ANY COMBINATION: CPT

## 2021-01-01 PROCEDURE — 83615 LACTATE (LD) (LDH) ENZYME: CPT

## 2021-01-01 PROCEDURE — 76937 US GUIDE VASCULAR ACCESS: CPT | Performed by: INTERNAL MEDICINE

## 2021-01-01 PROCEDURE — 77030004550 HC CATH ANGI DX PRF MRTM -B: Performed by: INTERNAL MEDICINE

## 2021-01-01 PROCEDURE — 74011250637 HC RX REV CODE- 250/637: Performed by: EMERGENCY MEDICINE

## 2021-01-01 PROCEDURE — 77030008771 HC TU NG SALEM SUMP -A: Performed by: NURSE ANESTHETIST, CERTIFIED REGISTERED

## 2021-01-01 PROCEDURE — 84484 ASSAY OF TROPONIN QUANT: CPT

## 2021-01-01 PROCEDURE — 02100Z9 BYPASS CORONARY ARTERY, ONE ARTERY FROM LEFT INTERNAL MAMMARY, OPEN APPROACH: ICD-10-PCS | Performed by: THORACIC SURGERY (CARDIOTHORACIC VASCULAR SURGERY)

## 2021-01-01 PROCEDURE — 85018 HEMOGLOBIN: CPT

## 2021-01-01 PROCEDURE — 93308 TTE F-UP OR LMTD: CPT

## 2021-01-01 PROCEDURE — 77030033150: Performed by: THORACIC SURGERY (CARDIOTHORACIC VASCULAR SURGERY)

## 2021-01-01 PROCEDURE — 77030037878 HC DRSG MEPILEX >48IN BORD MOLN -B: Performed by: THORACIC SURGERY (CARDIOTHORACIC VASCULAR SURGERY)

## 2021-01-01 PROCEDURE — 94729 DIFFUSING CAPACITY: CPT

## 2021-01-01 PROCEDURE — 99232 SBSQ HOSP IP/OBS MODERATE 35: CPT | Performed by: CLINICAL NURSE SPECIALIST

## 2021-01-01 PROCEDURE — 74011636637 HC RX REV CODE- 636/637: Performed by: INTERNAL MEDICINE

## 2021-01-01 PROCEDURE — 77030018673: Performed by: THORACIC SURGERY (CARDIOTHORACIC VASCULAR SURGERY)

## 2021-01-01 PROCEDURE — 99223 1ST HOSP IP/OBS HIGH 75: CPT | Performed by: THORACIC SURGERY (CARDIOTHORACIC VASCULAR SURGERY)

## 2021-01-01 PROCEDURE — 77030034936 HC DEV MIN COR-KNOT KT LSIS -F: Performed by: THORACIC SURGERY (CARDIOTHORACIC VASCULAR SURGERY)

## 2021-01-01 PROCEDURE — 77030018793 HC ORG SUT GAB FRAT TELE -B: Performed by: THORACIC SURGERY (CARDIOTHORACIC VASCULAR SURGERY)

## 2021-01-01 PROCEDURE — 77030005401 HC CATH RAD ARRO -A: Performed by: NURSE ANESTHETIST, CERTIFIED REGISTERED

## 2021-01-01 PROCEDURE — 74011000636 HC RX REV CODE- 636: Performed by: INTERNAL MEDICINE

## 2021-01-01 PROCEDURE — 77030002973 HC SUT PLEDG CV SFT OVL TELE -B: Performed by: THORACIC SURGERY (CARDIOTHORACIC VASCULAR SURGERY)

## 2021-01-01 PROCEDURE — 93312 ECHO TRANSESOPHAGEAL: CPT

## 2021-01-01 PROCEDURE — 77030019697 HC SYR ANGI INFL MRTM -B: Performed by: INTERNAL MEDICINE

## 2021-01-01 PROCEDURE — 74011250636 HC RX REV CODE- 250/636: Performed by: NURSE PRACTITIONER

## 2021-01-01 PROCEDURE — 77030002933 HC SUT MCRYL J&J -A: Performed by: THORACIC SURGERY (CARDIOTHORACIC VASCULAR SURGERY)

## 2021-01-01 PROCEDURE — 93880 EXTRACRANIAL BILAT STUDY: CPT

## 2021-01-01 PROCEDURE — 77030022521 HC CATH PERF VENT EDWD -B: Performed by: THORACIC SURGERY (CARDIOTHORACIC VASCULAR SURGERY)

## 2021-01-01 PROCEDURE — 77030018986 HC SUT ETHBND4 J&J -B: Performed by: THORACIC SURGERY (CARDIOTHORACIC VASCULAR SURGERY)

## 2021-01-01 PROCEDURE — 5A1221Z PERFORMANCE OF CARDIAC OUTPUT, CONTINUOUS: ICD-10-PCS | Performed by: THORACIC SURGERY (CARDIOTHORACIC VASCULAR SURGERY)

## 2021-01-01 PROCEDURE — 77030002912 HC SUT ETHBND J&J -A: Performed by: THORACIC SURGERY (CARDIOTHORACIC VASCULAR SURGERY)

## 2021-01-01 PROCEDURE — 74011636637 HC RX REV CODE- 636/637: Performed by: THORACIC SURGERY (CARDIOTHORACIC VASCULAR SURGERY)

## 2021-01-01 PROCEDURE — 77030026438 HC STYL ET INTUB CARD -A: Performed by: NURSE ANESTHETIST, CERTIFIED REGISTERED

## 2021-01-01 PROCEDURE — 77030040361 HC SLV COMPR DVT MDII -B

## 2021-01-01 PROCEDURE — 77030027138 HC INCENT SPIROMETER -A

## 2021-01-01 PROCEDURE — 77030013798 HC KT TRNSDUC PRSSR EDWD -B: Performed by: THORACIC SURGERY (CARDIOTHORACIC VASCULAR SURGERY)

## 2021-01-01 PROCEDURE — B2151ZZ FLUOROSCOPY OF LEFT HEART USING LOW OSMOLAR CONTRAST: ICD-10-PCS | Performed by: INTERNAL MEDICINE

## 2021-01-01 PROCEDURE — 74011250637 HC RX REV CODE- 250/637: Performed by: STUDENT IN AN ORGANIZED HEALTH CARE EDUCATION/TRAINING PROGRAM

## 2021-01-01 PROCEDURE — 77030041244 HC CBL PACE EXT TEMP REMG -B: Performed by: THORACIC SURGERY (CARDIOTHORACIC VASCULAR SURGERY)

## 2021-01-01 PROCEDURE — 83540 ASSAY OF IRON: CPT

## 2021-01-01 PROCEDURE — P9045 ALBUMIN (HUMAN), 5%, 250 ML: HCPCS | Performed by: PHYSICIAN ASSISTANT

## 2021-01-01 PROCEDURE — 74011250636 HC RX REV CODE- 250/636: Performed by: EMERGENCY MEDICINE

## 2021-01-01 PROCEDURE — 77030003020 HC SUT TICRN COVD -A: Performed by: THORACIC SURGERY (CARDIOTHORACIC VASCULAR SURGERY)

## 2021-01-01 PROCEDURE — 88305 TISSUE EXAM BY PATHOLOGIST: CPT

## 2021-01-01 PROCEDURE — 99152 MOD SED SAME PHYS/QHP 5/>YRS: CPT

## 2021-01-01 PROCEDURE — 74011636637 HC RX REV CODE- 636/637: Performed by: PHYSICIAN ASSISTANT

## 2021-01-01 PROCEDURE — 93571 IV DOP VEL&/PRESS C FLO 1ST: CPT | Performed by: INTERNAL MEDICINE

## 2021-01-01 PROCEDURE — P9045 ALBUMIN (HUMAN), 5%, 250 ML: HCPCS

## 2021-01-01 PROCEDURE — 77030012722 HC VLV MTRL MAGNA EDWD -K3: Performed by: THORACIC SURGERY (CARDIOTHORACIC VASCULAR SURGERY)

## 2021-01-01 PROCEDURE — 74011250637 HC RX REV CODE- 250/637: Performed by: NURSE PRACTITIONER

## 2021-01-01 PROCEDURE — 77030007667 HC INSRT SUT HLD MEDT -B: Performed by: THORACIC SURGERY (CARDIOTHORACIC VASCULAR SURGERY)

## 2021-01-01 PROCEDURE — 83605 ASSAY OF LACTIC ACID: CPT

## 2021-01-01 PROCEDURE — 88311 DECALCIFY TISSUE: CPT

## 2021-01-01 PROCEDURE — 027036Z DILATION OF CORONARY ARTERY, ONE ARTERY WITH THREE DRUG-ELUTING INTRALUMINAL DEVICES, PERCUTANEOUS APPROACH: ICD-10-PCS | Performed by: INTERNAL MEDICINE

## 2021-01-01 PROCEDURE — 86900 BLOOD TYPING SEROLOGIC ABO: CPT

## 2021-01-01 PROCEDURE — B245ZZ4 ULTRASONOGRAPHY OF LEFT HEART, TRANSESOPHAGEAL: ICD-10-PCS | Performed by: INTERNAL MEDICINE

## 2021-01-01 PROCEDURE — B2111ZZ FLUOROSCOPY OF MULTIPLE CORONARY ARTERIES USING LOW OSMOLAR CONTRAST: ICD-10-PCS | Performed by: INTERNAL MEDICINE

## 2021-01-01 PROCEDURE — 74011250636 HC RX REV CODE- 250/636: Performed by: ANESTHESIOLOGY

## 2021-01-01 PROCEDURE — 77030006921 HC BLD STRNL SAW TERU -B: Performed by: THORACIC SURGERY (CARDIOTHORACIC VASCULAR SURGERY)

## 2021-01-01 PROCEDURE — 77030011235 HC DRN PERCRD SUMP MEDT -B: Performed by: THORACIC SURGERY (CARDIOTHORACIC VASCULAR SURGERY)

## 2021-01-01 PROCEDURE — 71046 X-RAY EXAM CHEST 2 VIEWS: CPT

## 2021-01-01 PROCEDURE — C1874 STENT, COATED/COV W/DEL SYS: HCPCS | Performed by: INTERNAL MEDICINE

## 2021-01-01 PROCEDURE — 33533 CABG ARTERIAL SINGLE: CPT | Performed by: THORACIC SURGERY (CARDIOTHORACIC VASCULAR SURGERY)

## 2021-01-01 PROCEDURE — 77030013798 HC KT TRNSDUC PRSSR EDWD -B: Performed by: NURSE ANESTHETIST, CERTIFIED REGISTERED

## 2021-01-01 PROCEDURE — 94726 PLETHYSMOGRAPHY LUNG VOLUMES: CPT

## 2021-01-01 PROCEDURE — 74011000258 HC RX REV CODE- 258: Performed by: NURSE ANESTHETIST, CERTIFIED REGISTERED

## 2021-01-01 PROCEDURE — 77030008771 HC TU NG SALEM SUMP -A: Performed by: THORACIC SURGERY (CARDIOTHORACIC VASCULAR SURGERY)

## 2021-01-01 DEVICE — STENT RONYX27518UX RESOLUTE ONYX 2.75X18
Type: IMPLANTABLE DEVICE | Status: FUNCTIONAL
Brand: RESOLUTE ONYX™

## 2021-01-01 DEVICE — Z INACTIVE USE 2124449 DEVICE OCCL CLP L35MM SM FOOTPRINT 1 HND APPL SUTURELESS W/: Type: IMPLANTABLE DEVICE | Site: HEART | Status: FUNCTIONAL

## 2021-01-01 DEVICE — STENT RONYX22538UX RESOLUTE ONYX 2.25X38
Type: IMPLANTABLE DEVICE | Status: FUNCTIONAL
Brand: RESOLUTE ONYX™

## 2021-01-01 DEVICE — VALVE MI 27MM PERICARD HRT THERMAFIX PROC PERIMT MAGNA MI: Type: IMPLANTABLE DEVICE | Site: HEART | Status: FUNCTIONAL

## 2021-01-01 DEVICE — STENT RONYX25012UX RESOLUTE ONYX 2.50X12
Type: IMPLANTABLE DEVICE | Status: FUNCTIONAL
Brand: RESOLUTE ONYX™

## 2021-01-01 RX ORDER — SODIUM CHLORIDE 0.9 % (FLUSH) 0.9 %
5-40 SYRINGE (ML) INJECTION EVERY 8 HOURS
Status: DISCONTINUED | OUTPATIENT
Start: 2021-01-01 | End: 2021-01-01 | Stop reason: HOSPADM

## 2021-01-01 RX ORDER — FENTANYL CITRATE 50 UG/ML
50 INJECTION, SOLUTION INTRAMUSCULAR; INTRAVENOUS AS NEEDED
Status: DISCONTINUED | OUTPATIENT
Start: 2021-01-01 | End: 2021-01-01

## 2021-01-01 RX ORDER — ALBUMIN HUMAN 50 G/1000ML
SOLUTION INTRAVENOUS
Status: COMPLETED | OUTPATIENT
Start: 2021-01-01 | End: 2021-01-01

## 2021-01-01 RX ORDER — ALBUMIN HUMAN 50 G/1000ML
12.5 SOLUTION INTRAVENOUS
Status: COMPLETED | OUTPATIENT
Start: 2021-01-01 | End: 2021-01-01

## 2021-01-01 RX ORDER — CEFDINIR 300 MG/1
300 CAPSULE ORAL EVERY 12 HOURS
Qty: 3 CAPSULE | Refills: 0 | Status: ON HOLD | OUTPATIENT
Start: 2021-01-01 | End: 2021-01-01 | Stop reason: ALTCHOICE

## 2021-01-01 RX ORDER — METOPROLOL TARTRATE 25 MG/1
12.5 TABLET, FILM COATED ORAL 2 TIMES DAILY
Status: DISCONTINUED | OUTPATIENT
Start: 2021-01-01 | End: 2021-01-01 | Stop reason: HOSPADM

## 2021-01-01 RX ORDER — HEPARIN SODIUM 200 [USP'U]/100ML
INJECTION, SOLUTION INTRAVENOUS
Status: COMPLETED | OUTPATIENT
Start: 2021-01-01 | End: 2021-01-01

## 2021-01-01 RX ORDER — DEXTROSE 50 % IN WATER (D50W) INTRAVENOUS SYRINGE
25-50 AS NEEDED
Status: DISCONTINUED | OUTPATIENT
Start: 2021-01-01 | End: 2021-01-01 | Stop reason: HOSPADM

## 2021-01-01 RX ORDER — NALOXONE HYDROCHLORIDE 0.4 MG/ML
0.4 INJECTION, SOLUTION INTRAMUSCULAR; INTRAVENOUS; SUBCUTANEOUS AS NEEDED
Status: DISCONTINUED | OUTPATIENT
Start: 2021-01-01 | End: 2021-06-11 | Stop reason: HOSPADM

## 2021-01-01 RX ORDER — GUAIFENESIN 100 MG/5ML
81 LIQUID (ML) ORAL DAILY
Status: DISCONTINUED | OUTPATIENT
Start: 2021-01-01 | End: 2021-01-01 | Stop reason: HOSPADM

## 2021-01-01 RX ORDER — POTASSIUM CHLORIDE 750 MG/1
40 TABLET, FILM COATED, EXTENDED RELEASE ORAL DAILY
Status: DISCONTINUED | OUTPATIENT
Start: 2021-01-01 | End: 2021-01-01

## 2021-01-01 RX ORDER — NITROGLYCERIN 20 MG/100ML
0-20 INJECTION INTRAVENOUS
Status: DISCONTINUED | OUTPATIENT
Start: 2021-01-01 | End: 2021-01-01

## 2021-01-01 RX ORDER — MAGNESIUM SULFATE 100 %
4 CRYSTALS MISCELLANEOUS AS NEEDED
Status: DISCONTINUED | OUTPATIENT
Start: 2021-01-01 | End: 2021-01-01 | Stop reason: HOSPADM

## 2021-01-01 RX ORDER — POTASSIUM CHLORIDE 29.8 MG/ML
20 INJECTION INTRAVENOUS
Status: DISPENSED | OUTPATIENT
Start: 2021-01-01 | End: 2021-01-01

## 2021-01-01 RX ORDER — FUROSEMIDE 40 MG/1
40 TABLET ORAL DAILY
Status: DISCONTINUED | OUTPATIENT
Start: 2021-01-01 | End: 2021-01-01 | Stop reason: HOSPADM

## 2021-01-01 RX ORDER — MAGNESIUM SULFATE 100 %
4 CRYSTALS MISCELLANEOUS AS NEEDED
Status: DISCONTINUED | OUTPATIENT
Start: 2021-01-01 | End: 2021-06-11 | Stop reason: HOSPADM

## 2021-01-01 RX ORDER — SODIUM CHLORIDE, SODIUM LACTATE, POTASSIUM CHLORIDE, CALCIUM CHLORIDE 600; 310; 30; 20 MG/100ML; MG/100ML; MG/100ML; MG/100ML
100 INJECTION, SOLUTION INTRAVENOUS CONTINUOUS
Status: DISCONTINUED | OUTPATIENT
Start: 2021-01-01 | End: 2021-01-01 | Stop reason: HOSPADM

## 2021-01-01 RX ORDER — HYDROMORPHONE HYDROCHLORIDE 1 MG/ML
0.5 INJECTION, SOLUTION INTRAMUSCULAR; INTRAVENOUS; SUBCUTANEOUS
Status: DISCONTINUED | OUTPATIENT
Start: 2021-01-01 | End: 2021-01-01 | Stop reason: HOSPADM

## 2021-01-01 RX ORDER — POTASSIUM CHLORIDE 1500 MG/1
20 TABLET, FILM COATED, EXTENDED RELEASE ORAL DAILY
Qty: 30 TABLET | Refills: 11 | Status: SHIPPED | OUTPATIENT
Start: 2021-01-01

## 2021-01-01 RX ORDER — HEPARIN SODIUM 1000 [USP'U]/ML
INJECTION, SOLUTION INTRAVENOUS; SUBCUTANEOUS AS NEEDED
Status: DISCONTINUED | OUTPATIENT
Start: 2021-01-01 | End: 2021-06-11 | Stop reason: HOSPADM

## 2021-01-01 RX ORDER — GLIMEPIRIDE 4 MG/1
2 TABLET ORAL DAILY
Status: DISCONTINUED | OUTPATIENT
Start: 2021-01-01 | End: 2021-01-01 | Stop reason: HOSPADM

## 2021-01-01 RX ORDER — GLIMEPIRIDE 2 MG/1
2 TABLET ORAL
Qty: 90 TABLET | Refills: 3 | Status: SHIPPED | OUTPATIENT
Start: 2021-01-01

## 2021-01-01 RX ORDER — MORPHINE SULFATE 4 MG/ML
4 INJECTION INTRAVENOUS
Status: DISCONTINUED | OUTPATIENT
Start: 2021-01-01 | End: 2021-06-11 | Stop reason: HOSPADM

## 2021-01-01 RX ORDER — SERTRALINE HYDROCHLORIDE 50 MG/1
50 TABLET, FILM COATED ORAL DAILY
Status: DISCONTINUED | OUTPATIENT
Start: 2021-01-01 | End: 2021-06-11 | Stop reason: HOSPADM

## 2021-01-01 RX ORDER — EPINEPHRINE 0.1 MG/ML
INJECTION INTRACARDIAC; INTRAVENOUS
Status: DISCONTINUED
Start: 2021-01-01 | End: 2021-06-11 | Stop reason: HOSPADM

## 2021-01-01 RX ORDER — POLYETHYLENE GLYCOL 3350 17 G/17G
17 POWDER, FOR SOLUTION ORAL DAILY PRN
Status: DISCONTINUED | OUTPATIENT
Start: 2021-01-01 | End: 2021-01-01 | Stop reason: HOSPADM

## 2021-01-01 RX ORDER — POTASSIUM CHLORIDE 29.8 MG/ML
20 INJECTION INTRAVENOUS ONCE
Status: DISCONTINUED | OUTPATIENT
Start: 2021-01-01 | End: 2021-01-01

## 2021-01-01 RX ORDER — METFORMIN HYDROCHLORIDE 500 MG/1
TABLET ORAL
Qty: 180 TABLET | Refills: 3 | Status: SHIPPED | OUTPATIENT
Start: 2021-01-01

## 2021-01-01 RX ORDER — LEVOTHYROXINE SODIUM 112 UG/1
TABLET ORAL
Qty: 90 TABLET | Refills: 3 | Status: SHIPPED | OUTPATIENT
Start: 2021-01-01

## 2021-01-01 RX ORDER — HEPARIN SODIUM 1000 [USP'U]/ML
INJECTION, SOLUTION INTRAVENOUS; SUBCUTANEOUS AS NEEDED
Status: DISCONTINUED | OUTPATIENT
Start: 2021-01-01 | End: 2021-01-01 | Stop reason: HOSPADM

## 2021-01-01 RX ORDER — ALBUTEROL SULFATE 90 UG/1
2 AEROSOL, METERED RESPIRATORY (INHALATION)
Qty: 1 INHALER | Refills: 0 | Status: SHIPPED | OUTPATIENT
Start: 2021-01-01 | End: 2021-01-01

## 2021-01-01 RX ORDER — ACETAMINOPHEN 325 MG/1
650 TABLET ORAL ONCE
Status: DISCONTINUED | OUTPATIENT
Start: 2021-01-01 | End: 2021-01-01

## 2021-01-01 RX ORDER — MAGNESIUM SULFATE 1 G/100ML
1 INJECTION INTRAVENOUS AS NEEDED
Status: DISCONTINUED | OUTPATIENT
Start: 2021-01-01 | End: 2021-06-11 | Stop reason: HOSPADM

## 2021-01-01 RX ORDER — FUROSEMIDE 10 MG/ML
40 INJECTION INTRAMUSCULAR; INTRAVENOUS DAILY
Status: DISCONTINUED | OUTPATIENT
Start: 2021-01-01 | End: 2021-01-01

## 2021-01-01 RX ORDER — SODIUM CHLORIDE 9 MG/ML
INJECTION, SOLUTION INTRAVENOUS
Status: DISCONTINUED | OUTPATIENT
Start: 2021-01-01 | End: 2021-01-01 | Stop reason: HOSPADM

## 2021-01-01 RX ORDER — FUROSEMIDE 10 MG/ML
20 INJECTION INTRAMUSCULAR; INTRAVENOUS ONCE
Status: COMPLETED | OUTPATIENT
Start: 2021-01-01 | End: 2021-01-01

## 2021-01-01 RX ORDER — INSULIN LISPRO 100 [IU]/ML
INJECTION, SOLUTION INTRAVENOUS; SUBCUTANEOUS
Status: DISCONTINUED | OUTPATIENT
Start: 2021-01-01 | End: 2021-06-11 | Stop reason: HOSPADM

## 2021-01-01 RX ORDER — FENTANYL CITRATE 50 UG/ML
12.5-5 INJECTION, SOLUTION INTRAMUSCULAR; INTRAVENOUS
Status: DISCONTINUED | OUTPATIENT
Start: 2021-01-01 | End: 2021-01-01

## 2021-01-01 RX ORDER — CALCIUM CHLORIDE INJECTION 100 MG/ML
INJECTION, SOLUTION INTRAVENOUS
Status: COMPLETED | OUTPATIENT
Start: 2021-01-01 | End: 2021-01-01

## 2021-01-01 RX ORDER — ROCURONIUM BROMIDE 10 MG/ML
INJECTION, SOLUTION INTRAVENOUS AS NEEDED
Status: DISCONTINUED | OUTPATIENT
Start: 2021-01-01 | End: 2021-01-01 | Stop reason: HOSPADM

## 2021-01-01 RX ORDER — LIDOCAINE HYDROCHLORIDE 10 MG/ML
0.1 INJECTION, SOLUTION EPIDURAL; INFILTRATION; INTRACAUDAL; PERINEURAL AS NEEDED
Status: DISCONTINUED | OUTPATIENT
Start: 2021-01-01 | End: 2021-01-01

## 2021-01-01 RX ORDER — DESMOPRESSIN ACETATE 4 UG/ML
2 INJECTION, SOLUTION INTRAVENOUS; SUBCUTANEOUS ONCE
Status: COMPLETED | OUTPATIENT
Start: 2021-01-01 | End: 2021-01-01

## 2021-01-01 RX ORDER — GUAIFENESIN 100 MG/5ML
81 LIQUID (ML) ORAL DAILY
Qty: 30 TABLET | Refills: 11 | Status: SHIPPED | OUTPATIENT
Start: 2021-01-01

## 2021-01-01 RX ORDER — INSULIN LISPRO 100 [IU]/ML
INJECTION, SOLUTION INTRAVENOUS; SUBCUTANEOUS
Status: DISCONTINUED | OUTPATIENT
Start: 2021-01-01 | End: 2021-01-01 | Stop reason: HOSPADM

## 2021-01-01 RX ORDER — NOREPINEPHRINE BITARTRATE/D5W 8 MG/250ML
2-16 PLASTIC BAG, INJECTION (ML) INTRAVENOUS
Status: DISCONTINUED | OUTPATIENT
Start: 2021-01-01 | End: 2021-01-01

## 2021-01-01 RX ORDER — SODIUM BICARBONATE 1 MEQ/ML
50 SYRINGE (ML) INTRAVENOUS ONCE
Status: COMPLETED | OUTPATIENT
Start: 2021-01-01 | End: 2021-01-01

## 2021-01-01 RX ORDER — ALBUMIN HUMAN 50 G/1000ML
SOLUTION INTRAVENOUS AS NEEDED
Status: DISCONTINUED | OUTPATIENT
Start: 2021-01-01 | End: 2021-01-01 | Stop reason: HOSPADM

## 2021-01-01 RX ORDER — SODIUM CHLORIDE 9 MG/ML
9 INJECTION, SOLUTION INTRAVENOUS CONTINUOUS
Status: DISCONTINUED | OUTPATIENT
Start: 2021-01-01 | End: 2021-06-11 | Stop reason: HOSPADM

## 2021-01-01 RX ORDER — CEFAZOLIN SODIUM 1 G/3ML
INJECTION, POWDER, FOR SOLUTION INTRAMUSCULAR; INTRAVENOUS AS NEEDED
Status: DISCONTINUED | OUTPATIENT
Start: 2021-01-01 | End: 2021-01-01 | Stop reason: HOSPADM

## 2021-01-01 RX ORDER — PROTAMINE SULFATE 10 MG/ML
250 INJECTION, SOLUTION INTRAVENOUS
Status: DISCONTINUED | OUTPATIENT
Start: 2021-01-01 | End: 2021-01-01

## 2021-01-01 RX ORDER — PROMETHAZINE HYDROCHLORIDE 25 MG/1
12.5 TABLET ORAL
Status: DISCONTINUED | OUTPATIENT
Start: 2021-01-01 | End: 2021-01-01 | Stop reason: HOSPADM

## 2021-01-01 RX ORDER — DEXMEDETOMIDINE HYDROCHLORIDE 4 UG/ML
.1-1.5 INJECTION, SOLUTION INTRAVENOUS
Status: DISCONTINUED | OUTPATIENT
Start: 2021-01-01 | End: 2021-06-11 | Stop reason: HOSPADM

## 2021-01-01 RX ORDER — FENTANYL CITRATE 50 UG/ML
INJECTION, SOLUTION INTRAMUSCULAR; INTRAVENOUS AS NEEDED
Status: DISCONTINUED | OUTPATIENT
Start: 2021-01-01 | End: 2021-01-01 | Stop reason: HOSPADM

## 2021-01-01 RX ORDER — INSULIN GLARGINE 100 [IU]/ML
1-50 INJECTION, SOLUTION SUBCUTANEOUS
Status: DISCONTINUED | OUTPATIENT
Start: 2021-01-01 | End: 2021-06-11 | Stop reason: HOSPADM

## 2021-01-01 RX ORDER — MUPIROCIN 20 MG/G
OINTMENT TOPICAL 2 TIMES DAILY
Status: DISCONTINUED | OUTPATIENT
Start: 2021-01-01 | End: 2021-06-11 | Stop reason: HOSPADM

## 2021-01-01 RX ORDER — DEXAMETHASONE SODIUM PHOSPHATE 4 MG/ML
10 INJECTION, SOLUTION INTRA-ARTICULAR; INTRALESIONAL; INTRAMUSCULAR; INTRAVENOUS; SOFT TISSUE
Status: COMPLETED | OUTPATIENT
Start: 2021-01-01 | End: 2021-01-01

## 2021-01-01 RX ORDER — PREDNISONE 10 MG/1
TABLET ORAL
Qty: 48 TAB | Refills: 0 | Status: SHIPPED | OUTPATIENT
Start: 2021-01-01 | End: 2021-01-01 | Stop reason: ALTCHOICE

## 2021-01-01 RX ORDER — PHENYLEPHRINE HYDROCHLORIDE 10 MG/ML
INJECTION INTRAVENOUS AS NEEDED
Status: DISCONTINUED | OUTPATIENT
Start: 2021-01-01 | End: 2021-01-01 | Stop reason: HOSPADM

## 2021-01-01 RX ORDER — ENOXAPARIN SODIUM 100 MG/ML
1 INJECTION SUBCUTANEOUS EVERY 12 HOURS
Status: DISCONTINUED | OUTPATIENT
Start: 2021-01-01 | End: 2021-01-01 | Stop reason: HOSPADM

## 2021-01-01 RX ORDER — ONDANSETRON 2 MG/ML
4 INJECTION INTRAMUSCULAR; INTRAVENOUS
Status: DISCONTINUED | OUTPATIENT
Start: 2021-01-01 | End: 2021-01-01 | Stop reason: HOSPADM

## 2021-01-01 RX ORDER — PROPOFOL 10 MG/ML
INJECTION, EMULSION INTRAVENOUS AS NEEDED
Status: DISCONTINUED | OUTPATIENT
Start: 2021-01-01 | End: 2021-01-01 | Stop reason: HOSPADM

## 2021-01-01 RX ORDER — EPINEPHRINE 0.1 MG/ML
INJECTION INTRACARDIAC; INTRAVENOUS
Status: COMPLETED | OUTPATIENT
Start: 2021-01-01 | End: 2021-01-01

## 2021-01-01 RX ORDER — SODIUM CHLORIDE 9 MG/ML
25 INJECTION, SOLUTION INTRAVENOUS CONTINUOUS
Status: DISCONTINUED | OUTPATIENT
Start: 2021-01-01 | End: 2021-01-01

## 2021-01-01 RX ORDER — ROPIVACAINE HYDROCHLORIDE 5 MG/ML
30 INJECTION, SOLUTION EPIDURAL; INFILTRATION; PERINEURAL AS NEEDED
Status: DISCONTINUED | OUTPATIENT
Start: 2021-01-01 | End: 2021-01-01

## 2021-01-01 RX ORDER — SODIUM CHLORIDE, SODIUM LACTATE, POTASSIUM CHLORIDE, CALCIUM CHLORIDE 600; 310; 30; 20 MG/100ML; MG/100ML; MG/100ML; MG/100ML
25 INJECTION, SOLUTION INTRAVENOUS ONCE
Status: CANCELLED | OUTPATIENT
Start: 2021-01-01 | End: 2021-01-01

## 2021-01-01 RX ORDER — LOSARTAN POTASSIUM 25 MG/1
25 TABLET ORAL DAILY
Status: DISCONTINUED | OUTPATIENT
Start: 2021-01-01 | End: 2021-01-01 | Stop reason: HOSPADM

## 2021-01-01 RX ORDER — LIDOCAINE HYDROCHLORIDE 20 MG/ML
15 SOLUTION OROPHARYNGEAL AS NEEDED
Status: DISCONTINUED | OUTPATIENT
Start: 2021-01-01 | End: 2021-01-01

## 2021-01-01 RX ORDER — PROPOFOL 10 MG/ML
0-50 VIAL (ML) INTRAVENOUS
Status: DISCONTINUED | OUTPATIENT
Start: 2021-01-01 | End: 2021-06-11 | Stop reason: HOSPADM

## 2021-01-01 RX ORDER — PANTOPRAZOLE SODIUM 40 MG/1
40 TABLET, DELAYED RELEASE ORAL
Status: DISCONTINUED | OUTPATIENT
Start: 2021-01-01 | End: 2021-01-01 | Stop reason: HOSPADM

## 2021-01-01 RX ORDER — INSULIN LISPRO 100 [IU]/ML
INJECTION, SOLUTION INTRAVENOUS; SUBCUTANEOUS
Status: DISCONTINUED | OUTPATIENT
Start: 2021-06-11 | End: 2021-06-11 | Stop reason: HOSPADM

## 2021-01-01 RX ORDER — ACETAMINOPHEN 325 MG/1
650 TABLET ORAL ONCE
Status: COMPLETED | OUTPATIENT
Start: 2021-01-01 | End: 2021-01-01

## 2021-01-01 RX ORDER — CALCIUM CHLORIDE INJECTION 100 MG/ML
INJECTION, SOLUTION INTRAVENOUS
Status: DISPENSED
Start: 2021-01-01 | End: 2021-01-01

## 2021-01-01 RX ORDER — SUFENTANIL CITRATE 50 UG/ML
INJECTION EPIDURAL; INTRAVENOUS AS NEEDED
Status: DISCONTINUED | OUTPATIENT
Start: 2021-01-01 | End: 2021-01-01 | Stop reason: HOSPADM

## 2021-01-01 RX ORDER — SODIUM CHLORIDE, SODIUM LACTATE, POTASSIUM CHLORIDE, CALCIUM CHLORIDE 600; 310; 30; 20 MG/100ML; MG/100ML; MG/100ML; MG/100ML
25 INJECTION, SOLUTION INTRAVENOUS ONCE
Status: DISCONTINUED | OUTPATIENT
Start: 2021-01-01 | End: 2021-01-01

## 2021-01-01 RX ORDER — POLYETHYLENE GLYCOL 3350 17 G/17G
17 POWDER, FOR SOLUTION ORAL DAILY
Status: DISCONTINUED | OUTPATIENT
Start: 2021-01-01 | End: 2021-06-11 | Stop reason: HOSPADM

## 2021-01-01 RX ORDER — AMIODARONE HYDROCHLORIDE 200 MG/1
400 TABLET ORAL EVERY 12 HOURS
Status: DISCONTINUED | OUTPATIENT
Start: 2021-01-01 | End: 2021-06-11 | Stop reason: HOSPADM

## 2021-01-01 RX ORDER — LIDOCAINE HYDROCHLORIDE 20 MG/ML
INJECTION, SOLUTION EPIDURAL; INFILTRATION; INTRACAUDAL; PERINEURAL AS NEEDED
Status: DISCONTINUED | OUTPATIENT
Start: 2021-01-01 | End: 2021-01-01 | Stop reason: HOSPADM

## 2021-01-01 RX ORDER — ACETAMINOPHEN 325 MG/1
650 TABLET ORAL
Status: DISCONTINUED | OUTPATIENT
Start: 2021-01-01 | End: 2021-01-01 | Stop reason: HOSPADM

## 2021-01-01 RX ORDER — ACETAMINOPHEN 500 MG
1000 TABLET ORAL EVERY 6 HOURS
Status: DISCONTINUED | OUTPATIENT
Start: 2021-01-01 | End: 2021-06-11 | Stop reason: HOSPADM

## 2021-01-01 RX ORDER — CALCIUM CHLORIDE INJECTION 100 MG/ML
1 INJECTION, SOLUTION INTRAVENOUS ONCE
Status: COMPLETED | OUTPATIENT
Start: 2021-01-01 | End: 2021-01-01

## 2021-01-01 RX ORDER — MIDAZOLAM HYDROCHLORIDE 1 MG/ML
0.5 INJECTION, SOLUTION INTRAMUSCULAR; INTRAVENOUS
Status: DISCONTINUED | OUTPATIENT
Start: 2021-01-01 | End: 2021-01-01 | Stop reason: HOSPADM

## 2021-01-01 RX ORDER — FAMOTIDINE 20 MG/1
20 TABLET, FILM COATED ORAL EVERY 12 HOURS
Status: DISCONTINUED | OUTPATIENT
Start: 2021-01-01 | End: 2021-06-11 | Stop reason: HOSPADM

## 2021-01-01 RX ORDER — DOBUTAMINE HYDROCHLORIDE 200 MG/100ML
0-10 INJECTION INTRAVENOUS
Status: DISCONTINUED | OUTPATIENT
Start: 2021-01-01 | End: 2021-01-01 | Stop reason: CLARIF

## 2021-01-01 RX ORDER — CEFDINIR 300 MG/1
300 CAPSULE ORAL 2 TIMES DAILY
Qty: 20 CAP | Refills: 0 | Status: SHIPPED | OUTPATIENT
Start: 2021-01-01 | End: 2021-01-01

## 2021-01-01 RX ORDER — DEXTROSE 50 % IN WATER (D50W) INTRAVENOUS SYRINGE
12.5-25 AS NEEDED
Status: DISCONTINUED | OUTPATIENT
Start: 2021-01-01 | End: 2021-06-11 | Stop reason: HOSPADM

## 2021-01-01 RX ORDER — SODIUM CHLORIDE 0.9 % (FLUSH) 0.9 %
5-40 SYRINGE (ML) INJECTION AS NEEDED
Status: DISCONTINUED | OUTPATIENT
Start: 2021-01-01 | End: 2021-01-01 | Stop reason: HOSPADM

## 2021-01-01 RX ORDER — DOPAMINE HYDROCHLORIDE 320 MG/100ML
0-20 INJECTION, SOLUTION INTRAVENOUS
Status: DISCONTINUED | OUTPATIENT
Start: 2021-01-01 | End: 2021-01-01

## 2021-01-01 RX ORDER — SODIUM CHLORIDE 0.9 % (FLUSH) 0.9 %
5-40 SYRINGE (ML) INJECTION EVERY 8 HOURS
Status: DISCONTINUED | OUTPATIENT
Start: 2021-01-01 | End: 2021-06-11 | Stop reason: HOSPADM

## 2021-01-01 RX ORDER — SODIUM BICARBONATE 1 MEQ/ML
SYRINGE (ML) INTRAVENOUS
Status: COMPLETED | OUTPATIENT
Start: 2021-01-01 | End: 2021-01-01

## 2021-01-01 RX ORDER — CHLORHEXIDINE GLUCONATE 1.2 MG/ML
10 RINSE ORAL EVERY 12 HOURS
Status: DISCONTINUED | OUTPATIENT
Start: 2021-01-01 | End: 2021-06-11 | Stop reason: HOSPADM

## 2021-01-01 RX ORDER — ARFORMOTEROL TARTRATE 15 UG/2ML
15 SOLUTION RESPIRATORY (INHALATION)
Status: DISCONTINUED | OUTPATIENT
Start: 2021-01-01 | End: 2021-06-11 | Stop reason: HOSPADM

## 2021-01-01 RX ORDER — LEVOTHYROXINE SODIUM 112 UG/1
112 TABLET ORAL
Status: DISCONTINUED | OUTPATIENT
Start: 2021-01-01 | End: 2021-06-11 | Stop reason: HOSPADM

## 2021-01-01 RX ORDER — ATORVASTATIN CALCIUM 40 MG/1
40 TABLET, FILM COATED ORAL
Qty: 30 TABLET | Refills: 11 | Status: SHIPPED | OUTPATIENT
Start: 2021-01-01

## 2021-01-01 RX ORDER — ALBUTEROL SULFATE 0.83 MG/ML
2.5 SOLUTION RESPIRATORY (INHALATION)
Status: DISCONTINUED | OUTPATIENT
Start: 2021-01-01 | End: 2021-06-11 | Stop reason: HOSPADM

## 2021-01-01 RX ORDER — POTASSIUM CHLORIDE 750 MG/1
20 TABLET, FILM COATED, EXTENDED RELEASE ORAL DAILY
Status: DISCONTINUED | OUTPATIENT
Start: 2021-01-01 | End: 2021-01-01 | Stop reason: HOSPADM

## 2021-01-01 RX ORDER — SUCCINYLCHOLINE CHLORIDE 20 MG/ML
INJECTION INTRAMUSCULAR; INTRAVENOUS AS NEEDED
Status: DISCONTINUED | OUTPATIENT
Start: 2021-01-01 | End: 2021-01-01 | Stop reason: HOSPADM

## 2021-01-01 RX ORDER — POTASSIUM CHLORIDE 29.8 MG/ML
20 INJECTION INTRAVENOUS
Status: DISCONTINUED | OUTPATIENT
Start: 2021-01-01 | End: 2021-06-11 | Stop reason: HOSPADM

## 2021-01-01 RX ORDER — MIDAZOLAM HYDROCHLORIDE 1 MG/ML
1 INJECTION, SOLUTION INTRAMUSCULAR; INTRAVENOUS AS NEEDED
Status: DISCONTINUED | OUTPATIENT
Start: 2021-01-01 | End: 2021-01-01

## 2021-01-01 RX ORDER — MORPHINE SULFATE 2 MG/ML
2 INJECTION, SOLUTION INTRAMUSCULAR; INTRAVENOUS
Status: DISCONTINUED | OUTPATIENT
Start: 2021-01-01 | End: 2021-01-01 | Stop reason: HOSPADM

## 2021-01-01 RX ORDER — ONDANSETRON 2 MG/ML
INJECTION INTRAMUSCULAR; INTRAVENOUS AS NEEDED
Status: DISCONTINUED | OUTPATIENT
Start: 2021-01-01 | End: 2021-01-01 | Stop reason: HOSPADM

## 2021-01-01 RX ORDER — CHLORHEXIDINE GLUCONATE 1.2 MG/ML
15 RINSE ORAL EVERY 12 HOURS
Qty: 420 ML | Refills: 0 | Status: SHIPPED | OUTPATIENT
Start: 2021-01-01 | End: 2021-06-21

## 2021-01-01 RX ORDER — SERTRALINE HYDROCHLORIDE 50 MG/1
50 TABLET, FILM COATED ORAL DAILY
Status: DISCONTINUED | OUTPATIENT
Start: 2021-01-01 | End: 2021-01-01 | Stop reason: HOSPADM

## 2021-01-01 RX ORDER — ACETAMINOPHEN 650 MG/1
650 SUPPOSITORY RECTAL
Status: DISCONTINUED | OUTPATIENT
Start: 2021-01-01 | End: 2021-01-01 | Stop reason: HOSPADM

## 2021-01-01 RX ORDER — SODIUM CHLORIDE 0.9 % (FLUSH) 0.9 %
5-40 SYRINGE (ML) INJECTION AS NEEDED
Status: DISCONTINUED | OUTPATIENT
Start: 2021-01-01 | End: 2021-06-11 | Stop reason: HOSPADM

## 2021-01-01 RX ORDER — SUFENTANIL CITRATE 50 UG/ML
INJECTION EPIDURAL; INTRAVENOUS
Status: DISCONTINUED | OUTPATIENT
Start: 2021-01-01 | End: 2021-01-01 | Stop reason: HOSPADM

## 2021-01-01 RX ORDER — MAGNESIUM SULFATE HEPTAHYDRATE 40 MG/ML
2 INJECTION, SOLUTION INTRAVENOUS ONCE
Status: COMPLETED | OUTPATIENT
Start: 2021-01-01 | End: 2021-01-01

## 2021-01-01 RX ORDER — LANOLIN ALCOHOL/MO/W.PET/CERES
400 CREAM (GRAM) TOPICAL 2 TIMES DAILY
Status: DISCONTINUED | OUTPATIENT
Start: 2021-01-01 | End: 2021-06-11 | Stop reason: HOSPADM

## 2021-01-01 RX ORDER — AMOXICILLIN 250 MG
1 CAPSULE ORAL 2 TIMES DAILY
Status: DISCONTINUED | OUTPATIENT
Start: 2021-01-01 | End: 2021-06-11 | Stop reason: HOSPADM

## 2021-01-01 RX ORDER — PROTAMINE SULFATE 10 MG/ML
INJECTION, SOLUTION INTRAVENOUS AS NEEDED
Status: DISCONTINUED | OUTPATIENT
Start: 2021-01-01 | End: 2021-01-01 | Stop reason: HOSPADM

## 2021-01-01 RX ORDER — FACIAL-BODY WIPES
10 EACH TOPICAL DAILY PRN
Status: DISCONTINUED | OUTPATIENT
Start: 2021-01-01 | End: 2021-06-11 | Stop reason: HOSPADM

## 2021-01-01 RX ORDER — CEFDINIR 300 MG/1
300 CAPSULE ORAL EVERY 12 HOURS
Status: DISCONTINUED | OUTPATIENT
Start: 2021-01-01 | End: 2021-01-01 | Stop reason: HOSPADM

## 2021-01-01 RX ORDER — OXYCODONE HYDROCHLORIDE 5 MG/1
5 TABLET ORAL
Status: DISCONTINUED | OUTPATIENT
Start: 2021-01-01 | End: 2021-06-11 | Stop reason: HOSPADM

## 2021-01-01 RX ORDER — ACETAMINOPHEN 325 MG/1
650 TABLET ORAL EVERY 4 HOURS
Status: DISCONTINUED | OUTPATIENT
Start: 2021-01-01 | End: 2021-01-01

## 2021-01-01 RX ORDER — ONDANSETRON 2 MG/ML
4 INJECTION INTRAMUSCULAR; INTRAVENOUS AS NEEDED
Status: DISCONTINUED | OUTPATIENT
Start: 2021-01-01 | End: 2021-01-01 | Stop reason: HOSPADM

## 2021-01-01 RX ORDER — NOREPINEPHRINE BITARTRATE/D5W 8 MG/250ML
PLASTIC BAG, INJECTION (ML) INTRAVENOUS
Status: COMPLETED | OUTPATIENT
Start: 2021-01-01 | End: 2021-01-01

## 2021-01-01 RX ORDER — BUMETANIDE 0.25 MG/ML
2 INJECTION INTRAMUSCULAR; INTRAVENOUS ONCE
Status: DISCONTINUED | OUTPATIENT
Start: 2021-01-01 | End: 2021-01-01

## 2021-01-01 RX ORDER — ALBUMIN HUMAN 50 G/1000ML
SOLUTION INTRAVENOUS
Status: DISCONTINUED
Start: 2021-01-01 | End: 2021-06-11 | Stop reason: HOSPADM

## 2021-01-01 RX ORDER — PHENYLEPHRINE HCL IN 0.9% NACL 100MG/250
10-100 PLASTIC BAG, INJECTION (ML) INTRAVENOUS
Status: DISCONTINUED | OUTPATIENT
Start: 2021-01-01 | End: 2021-06-11 | Stop reason: HOSPADM

## 2021-01-01 RX ORDER — SODIUM CHLORIDE, SODIUM LACTATE, POTASSIUM CHLORIDE, CALCIUM CHLORIDE 600; 310; 30; 20 MG/100ML; MG/100ML; MG/100ML; MG/100ML
100 INJECTION, SOLUTION INTRAVENOUS CONTINUOUS
Status: DISCONTINUED | OUTPATIENT
Start: 2021-01-01 | End: 2021-01-01

## 2021-01-01 RX ORDER — ATORVASTATIN CALCIUM 40 MG/1
40 TABLET, FILM COATED ORAL
Status: DISCONTINUED | OUTPATIENT
Start: 2021-01-01 | End: 2021-01-01 | Stop reason: HOSPADM

## 2021-01-01 RX ORDER — LEVOTHYROXINE SODIUM 112 UG/1
112 TABLET ORAL
Status: DISCONTINUED | OUTPATIENT
Start: 2021-01-01 | End: 2021-01-01 | Stop reason: HOSPADM

## 2021-01-01 RX ORDER — SODIUM CHLORIDE 0.9 % (FLUSH) 0.9 %
5-40 SYRINGE (ML) INJECTION EVERY 8 HOURS
Status: DISCONTINUED | OUTPATIENT
Start: 2021-01-01 | End: 2021-01-01

## 2021-01-01 RX ORDER — EPINEPHRINE 0.1 MG/ML
INJECTION INTRACARDIAC; INTRAVENOUS AS NEEDED
Status: DISCONTINUED | OUTPATIENT
Start: 2021-01-01 | End: 2021-01-01 | Stop reason: HOSPADM

## 2021-01-01 RX ORDER — VALSARTAN AND HYDROCHLOROTHIAZIDE 80; 12.5 MG/1; MG/1
TABLET, FILM COATED ORAL
Qty: 90 TABLET | Refills: 3 | Status: SHIPPED | OUTPATIENT
Start: 2021-01-01 | End: 2021-01-01

## 2021-01-01 RX ORDER — MIDAZOLAM HYDROCHLORIDE 1 MG/ML
INJECTION, SOLUTION INTRAMUSCULAR; INTRAVENOUS AS NEEDED
Status: DISCONTINUED | OUTPATIENT
Start: 2021-01-01 | End: 2021-01-01 | Stop reason: HOSPADM

## 2021-01-01 RX ORDER — SODIUM BICARBONATE 1 MEQ/ML
50 SYRINGE (ML) INTRAVENOUS ONCE
Status: DISCONTINUED | OUTPATIENT
Start: 2021-01-01 | End: 2021-06-11 | Stop reason: HOSPADM

## 2021-01-01 RX ORDER — EPINEPHRINE 0.1 MG/ML
1 INJECTION INTRACARDIAC; INTRAVENOUS ONCE
Status: COMPLETED | OUTPATIENT
Start: 2021-01-01 | End: 2021-01-01

## 2021-01-01 RX ORDER — ATORVASTATIN CALCIUM 40 MG/1
40 TABLET, FILM COATED ORAL
Status: DISCONTINUED | OUTPATIENT
Start: 2021-01-01 | End: 2021-06-11 | Stop reason: HOSPADM

## 2021-01-01 RX ORDER — ALBUMIN HUMAN 50 G/1000ML
SOLUTION INTRAVENOUS
Status: COMPLETED
Start: 2021-01-01 | End: 2021-01-01

## 2021-01-01 RX ORDER — SODIUM CHLORIDE 450 MG/100ML
INJECTION, SOLUTION INTRAVENOUS
Status: DISCONTINUED | OUTPATIENT
Start: 2021-01-01 | End: 2021-01-01 | Stop reason: HOSPADM

## 2021-01-01 RX ORDER — MIDAZOLAM HYDROCHLORIDE 1 MG/ML
1 INJECTION, SOLUTION INTRAMUSCULAR; INTRAVENOUS
Status: DISCONTINUED | OUTPATIENT
Start: 2021-01-01 | End: 2021-06-11 | Stop reason: HOSPADM

## 2021-01-01 RX ORDER — SODIUM BICARBONATE 1 MEQ/ML
SYRINGE (ML) INTRAVENOUS
Status: DISPENSED
Start: 2021-01-01 | End: 2021-01-01

## 2021-01-01 RX ORDER — SERTRALINE HYDROCHLORIDE 50 MG/1
TABLET, FILM COATED ORAL
Qty: 90 TAB | Refills: 1 | Status: SHIPPED | OUTPATIENT
Start: 2021-01-01 | End: 2021-01-01 | Stop reason: SDUPTHER

## 2021-01-01 RX ORDER — CEFDINIR 300 MG/1
300 CAPSULE ORAL
Status: COMPLETED | OUTPATIENT
Start: 2021-01-01 | End: 2021-01-01

## 2021-01-01 RX ORDER — GLIMEPIRIDE 2 MG/1
TABLET ORAL
Qty: 90 TAB | Refills: 1 | Status: SHIPPED | OUTPATIENT
Start: 2021-01-01 | End: 2021-01-01 | Stop reason: SDUPTHER

## 2021-01-01 RX ORDER — FENTANYL CITRATE 50 UG/ML
25 INJECTION, SOLUTION INTRAMUSCULAR; INTRAVENOUS
Status: DISCONTINUED | OUTPATIENT
Start: 2021-01-01 | End: 2021-01-01 | Stop reason: HOSPADM

## 2021-01-01 RX ORDER — FUROSEMIDE 40 MG/1
TABLET ORAL
Qty: 30 TABLET | Refills: 11 | Status: SHIPPED | OUTPATIENT
Start: 2021-01-01

## 2021-01-01 RX ORDER — SODIUM BICARBONATE 1 MEQ/ML
SYRINGE (ML) INTRAVENOUS AS NEEDED
Status: DISCONTINUED | OUTPATIENT
Start: 2021-01-01 | End: 2021-01-01 | Stop reason: HOSPADM

## 2021-01-01 RX ORDER — OXYCODONE HYDROCHLORIDE 5 MG/1
10 TABLET ORAL
Status: DISCONTINUED | OUTPATIENT
Start: 2021-01-01 | End: 2021-06-11 | Stop reason: HOSPADM

## 2021-01-01 RX ORDER — PAPAVERINE HYDROCHLORIDE 30 MG/ML
INJECTION INTRAMUSCULAR; INTRAVENOUS AS NEEDED
Status: DISCONTINUED | OUTPATIENT
Start: 2021-01-01 | End: 2021-06-11 | Stop reason: HOSPADM

## 2021-01-01 RX ORDER — MUPIROCIN 20 MG/G
OINTMENT TOPICAL 2 TIMES DAILY
Qty: 22 G | Refills: 0 | Status: SHIPPED | OUTPATIENT
Start: 2021-01-01

## 2021-01-01 RX ORDER — PROPOFOL 10 MG/ML
INJECTION, EMULSION INTRAVENOUS
Status: DISPENSED
Start: 2021-01-01 | End: 2021-01-01

## 2021-01-01 RX ORDER — MIDAZOLAM HYDROCHLORIDE 1 MG/ML
.5-2 INJECTION, SOLUTION INTRAMUSCULAR; INTRAVENOUS
Status: DISCONTINUED | OUTPATIENT
Start: 2021-01-01 | End: 2021-01-01

## 2021-01-01 RX ORDER — LIDOCAINE HYDROCHLORIDE 10 MG/ML
INJECTION, SOLUTION EPIDURAL; INFILTRATION; INTRACAUDAL; PERINEURAL AS NEEDED
Status: DISCONTINUED | OUTPATIENT
Start: 2021-01-01 | End: 2021-01-01 | Stop reason: HOSPADM

## 2021-01-01 RX ORDER — NOREPINEPHRINE BITARTRATE/D5W 8 MG/250ML
.5-3 PLASTIC BAG, INJECTION (ML) INTRAVENOUS
Status: DISCONTINUED | OUTPATIENT
Start: 2021-01-01 | End: 2021-06-11 | Stop reason: HOSPADM

## 2021-01-01 RX ORDER — GUAIFENESIN 100 MG/5ML
81 LIQUID (ML) ORAL DAILY
Status: DISCONTINUED | OUTPATIENT
Start: 2021-01-01 | End: 2021-06-11 | Stop reason: HOSPADM

## 2021-01-01 RX ORDER — DIPHENHYDRAMINE HYDROCHLORIDE 50 MG/ML
12.5 INJECTION, SOLUTION INTRAMUSCULAR; INTRAVENOUS AS NEEDED
Status: DISCONTINUED | OUTPATIENT
Start: 2021-01-01 | End: 2021-01-01 | Stop reason: HOSPADM

## 2021-01-01 RX ORDER — SERTRALINE HYDROCHLORIDE 50 MG/1
TABLET, FILM COATED ORAL
Qty: 90 TABLET | Refills: 3 | Status: SHIPPED | OUTPATIENT
Start: 2021-01-01

## 2021-01-01 RX ORDER — SODIUM CHLORIDE 0.9 % (FLUSH) 0.9 %
5-40 SYRINGE (ML) INJECTION AS NEEDED
Status: DISCONTINUED | OUTPATIENT
Start: 2021-01-01 | End: 2021-01-01

## 2021-01-01 RX ORDER — POTASSIUM CHLORIDE 750 MG/1
40 TABLET, FILM COATED, EXTENDED RELEASE ORAL 2 TIMES DAILY
Status: DISCONTINUED | OUTPATIENT
Start: 2021-01-01 | End: 2021-01-01

## 2021-01-01 RX ORDER — LIDOCAINE HYDROCHLORIDE 10 MG/ML
INJECTION INFILTRATION; PERINEURAL AS NEEDED
Status: DISCONTINUED | OUTPATIENT
Start: 2021-01-01 | End: 2021-01-01 | Stop reason: HOSPADM

## 2021-01-01 RX ORDER — ONDANSETRON 2 MG/ML
4 INJECTION INTRAMUSCULAR; INTRAVENOUS
Status: DISCONTINUED | OUTPATIENT
Start: 2021-01-01 | End: 2021-06-11 | Stop reason: HOSPADM

## 2021-01-01 RX ORDER — SODIUM CHLORIDE 450 MG/100ML
10 INJECTION, SOLUTION INTRAVENOUS CONTINUOUS
Status: DISCONTINUED | OUTPATIENT
Start: 2021-01-01 | End: 2021-06-11 | Stop reason: HOSPADM

## 2021-01-01 RX ORDER — DOBUTAMINE HYDROCHLORIDE 400 MG/100ML
0-10 INJECTION INTRAVENOUS
Status: DISCONTINUED | OUTPATIENT
Start: 2021-01-01 | End: 2021-06-11 | Stop reason: HOSPADM

## 2021-01-01 RX ORDER — BUDESONIDE 0.25 MG/2ML
500 INHALANT ORAL
Status: DISCONTINUED | OUTPATIENT
Start: 2021-01-01 | End: 2021-06-11 | Stop reason: HOSPADM

## 2021-01-01 RX ORDER — SODIUM BICARBONATE 1 MEQ/ML
SYRINGE (ML) INTRAVENOUS
Status: DISCONTINUED
Start: 2021-01-01 | End: 2021-06-11 | Stop reason: HOSPADM

## 2021-01-01 RX ADMIN — Medication 10 ML: at 16:00

## 2021-01-01 RX ADMIN — MIDAZOLAM HYDROCHLORIDE 1 MG: 1 INJECTION, SOLUTION INTRAMUSCULAR; INTRAVENOUS at 16:12

## 2021-01-01 RX ADMIN — ALBUMIN (HUMAN) 12.5 G: 12.5 INJECTION, SOLUTION INTRAVENOUS at 15:38

## 2021-01-01 RX ADMIN — LIDOCAINE HYDROCHLORIDE 15 ML: 20 SOLUTION ORAL at 14:27

## 2021-01-01 RX ADMIN — INSULIN LISPRO 2 UNITS: 100 INJECTION, SOLUTION INTRAVENOUS; SUBCUTANEOUS at 11:54

## 2021-01-01 RX ADMIN — PHENYLEPHRINE HYDROCHLORIDE 90 MCG/MIN: 10 INJECTION INTRAVENOUS at 16:36

## 2021-01-01 RX ADMIN — SODIUM CHLORIDE 14.9 UNITS/HR: 9 INJECTION, SOLUTION INTRAVENOUS at 17:58

## 2021-01-01 RX ADMIN — FUROSEMIDE 20 MG: 10 INJECTION, SOLUTION INTRAMUSCULAR; INTRAVENOUS at 08:48

## 2021-01-01 RX ADMIN — FUROSEMIDE 40 MG: 40 TABLET ORAL at 09:04

## 2021-01-01 RX ADMIN — ENOXAPARIN SODIUM 100 MG: 100 INJECTION SUBCUTANEOUS at 08:02

## 2021-01-01 RX ADMIN — MUPIROCIN: 20 OINTMENT TOPICAL at 08:11

## 2021-01-01 RX ADMIN — PHENYLEPHRINE HYDROCHLORIDE 90 MCG/MIN: 10 INJECTION INTRAVENOUS at 15:21

## 2021-01-01 RX ADMIN — POTASSIUM CHLORIDE 20 MEQ: 400 INJECTION, SOLUTION INTRAVENOUS at 18:14

## 2021-01-01 RX ADMIN — ENOXAPARIN SODIUM 100 MG: 100 INJECTION SUBCUTANEOUS at 22:01

## 2021-01-01 RX ADMIN — MIDAZOLAM 1 MG: 1 INJECTION INTRAMUSCULAR; INTRAVENOUS at 07:15

## 2021-01-01 RX ADMIN — WATER 2 G: 1 INJECTION INTRAMUSCULAR; INTRAVENOUS; SUBCUTANEOUS at 20:36

## 2021-01-01 RX ADMIN — POTASSIUM CHLORIDE 20 MEQ: 400 INJECTION, SOLUTION INTRAVENOUS at 16:33

## 2021-01-01 RX ADMIN — PANTOPRAZOLE SODIUM 40 MG: 40 TABLET, DELAYED RELEASE ORAL at 05:21

## 2021-01-01 RX ADMIN — SODIUM CHLORIDE 200 MCG: 900 INJECTION, SOLUTION INTRAVENOUS at 08:10

## 2021-01-01 RX ADMIN — Medication 10 ML: at 05:21

## 2021-01-01 RX ADMIN — SUCCINYLCHOLINE CHLORIDE 140 MG: 20 INJECTION, SOLUTION INTRAMUSCULAR; INTRAVENOUS at 08:10

## 2021-01-01 RX ADMIN — SODIUM CHLORIDE 2.3 UNITS/HR: 9 INJECTION, SOLUTION INTRAVENOUS at 09:25

## 2021-01-01 RX ADMIN — ENOXAPARIN SODIUM 100 MG: 100 INJECTION SUBCUTANEOUS at 07:40

## 2021-01-01 RX ADMIN — SODIUM CHLORIDE 80 MCG/MIN: 900 INJECTION, SOLUTION INTRAVENOUS at 08:30

## 2021-01-01 RX ADMIN — SODIUM CHLORIDE 0.25 MCG/KG/MIN: 9 INJECTION, SOLUTION INTRAVENOUS at 06:45

## 2021-01-01 RX ADMIN — SODIUM BICARBONATE 50 MEQ: 84 INJECTION, SOLUTION INTRAVENOUS at 23:01

## 2021-01-01 RX ADMIN — POTASSIUM CHLORIDE 20 MEQ: 750 TABLET, EXTENDED RELEASE ORAL at 09:04

## 2021-01-01 RX ADMIN — LEVOTHYROXINE SODIUM 112 MCG: 0.11 TABLET ORAL at 08:10

## 2021-01-01 RX ADMIN — BUDESONIDE 500 MCG: 0.25 INHALANT RESPIRATORY (INHALATION) at 11:15

## 2021-01-01 RX ADMIN — METOPROLOL TARTRATE 12.5 MG: 25 TABLET ORAL at 08:54

## 2021-01-01 RX ADMIN — SERTRALINE 50 MG: 50 TABLET, FILM COATED ORAL at 08:54

## 2021-01-01 RX ADMIN — SODIUM CHLORIDE 9 ML/HR: 9 INJECTION, SOLUTION INTRAVENOUS at 16:00

## 2021-01-01 RX ADMIN — HEPARIN SODIUM 40000 UNITS: 1000 INJECTION, SOLUTION INTRAVENOUS; SUBCUTANEOUS at 09:13

## 2021-01-01 RX ADMIN — ONDANSETRON 4 MG: 2 INJECTION INTRAMUSCULAR; INTRAVENOUS at 15:31

## 2021-01-01 RX ADMIN — ACETAMINOPHEN 650 MG: 325 TABLET ORAL at 23:23

## 2021-01-01 RX ADMIN — INSULIN LISPRO 2 UNITS: 100 INJECTION, SOLUTION INTRAVENOUS; SUBCUTANEOUS at 11:40

## 2021-01-01 RX ADMIN — MAGNESIUM SULFATE HEPTAHYDRATE 2 G: 40 INJECTION, SOLUTION INTRAVENOUS at 13:38

## 2021-01-01 RX ADMIN — GLIMEPIRIDE 2 MG: 4 TABLET ORAL at 08:39

## 2021-01-01 RX ADMIN — ASPIRIN 81 MG CHEWABLE TABLET 81 MG: 81 TABLET CHEWABLE at 08:54

## 2021-01-01 RX ADMIN — Medication 10 ML: at 22:16

## 2021-01-01 RX ADMIN — AMIODARONE HYDROCHLORIDE 1 MG/MIN: 50 INJECTION, SOLUTION INTRAVENOUS at 09:16

## 2021-01-01 RX ADMIN — CEFAZOLIN 2 G: 1 INJECTION, POWDER, FOR SOLUTION INTRAMUSCULAR; INTRAVENOUS; PARENTERAL at 08:30

## 2021-01-01 RX ADMIN — DEXMEDETOMIDINE 0.8 MCG/KG/HR: 100 INJECTION, SOLUTION, CONCENTRATE INTRAVENOUS at 17:10

## 2021-01-01 RX ADMIN — GLIMEPIRIDE 2 MG: 4 TABLET ORAL at 10:26

## 2021-01-01 RX ADMIN — PHENYLEPHRINE HYDROCHLORIDE 70 MCG/MIN: 10 INJECTION INTRAVENOUS at 08:29

## 2021-01-01 RX ADMIN — Medication 400 MG: at 17:08

## 2021-01-01 RX ADMIN — Medication 10 ML: at 22:46

## 2021-01-01 RX ADMIN — METOPROLOL TARTRATE 12.5 MG: 25 TABLET ORAL at 17:41

## 2021-01-01 RX ADMIN — ASPIRIN 81 MG CHEWABLE TABLET 81 MG: 81 TABLET CHEWABLE at 08:01

## 2021-01-01 RX ADMIN — METOPROLOL TARTRATE 12.5 MG: 25 TABLET ORAL at 09:04

## 2021-01-01 RX ADMIN — ARFORMOTEROL TARTRATE 15 MCG: 15 SOLUTION RESPIRATORY (INHALATION) at 11:15

## 2021-01-01 RX ADMIN — Medication 10 ML: at 14:22

## 2021-01-01 RX ADMIN — ALBUMIN (HUMAN) 12.5 G: 12.5 INJECTION, SOLUTION INTRAVENOUS at 15:46

## 2021-01-01 RX ADMIN — Medication 10 ML: at 17:32

## 2021-01-01 RX ADMIN — SERTRALINE HYDROCHLORIDE 50 MG: 50 TABLET ORAL at 08:10

## 2021-01-01 RX ADMIN — WATER 2 G: 1 INJECTION INTRAMUSCULAR; INTRAVENOUS; SUBCUTANEOUS at 08:00

## 2021-01-01 RX ADMIN — CEFDINIR 300 MG: 300 CAPSULE ORAL at 21:27

## 2021-01-01 RX ADMIN — FUROSEMIDE 40 MG: 10 INJECTION, SOLUTION INTRAMUSCULAR; INTRAVENOUS at 08:02

## 2021-01-01 RX ADMIN — ENOXAPARIN SODIUM 100 MG: 100 INJECTION SUBCUTANEOUS at 21:14

## 2021-01-01 RX ADMIN — FUROSEMIDE 40 MG: 10 INJECTION, SOLUTION INTRAMUSCULAR; INTRAVENOUS at 10:45

## 2021-01-01 RX ADMIN — MIDAZOLAM 2 MG: 1 INJECTION INTRAMUSCULAR; INTRAVENOUS at 12:01

## 2021-01-01 RX ADMIN — 0.12% CHLORHEXIDINE GLUCONATE 10 ML: 1.2 RINSE ORAL at 08:11

## 2021-01-01 RX ADMIN — MORPHINE SULFATE 4 MG: 4 INJECTION INTRAVENOUS at 17:25

## 2021-01-01 RX ADMIN — Medication 10 ML: at 05:22

## 2021-01-01 RX ADMIN — METOPROLOL TARTRATE 12.5 MG: 25 TABLET ORAL at 10:12

## 2021-01-01 RX ADMIN — CALCIUM CHLORIDE 1 G: 100 INJECTION, SOLUTION INTRAVENOUS at 22:37

## 2021-01-01 RX ADMIN — Medication 10 ML: at 22:42

## 2021-01-01 RX ADMIN — SODIUM CHLORIDE: 9 INJECTION, SOLUTION INTRAVENOUS at 08:02

## 2021-01-01 RX ADMIN — EPINEPHRINE 1 MG: 0.1 INJECTION, SOLUTION ENDOTRACHEAL; INTRACARDIAC; INTRAVENOUS at 23:02

## 2021-01-01 RX ADMIN — MIDAZOLAM 2 MG: 1 INJECTION INTRAMUSCULAR; INTRAVENOUS at 07:10

## 2021-01-01 RX ADMIN — GLIMEPIRIDE 2 MG: 4 TABLET ORAL at 09:00

## 2021-01-01 RX ADMIN — METOPROLOL TARTRATE 12.5 MG: 25 TABLET ORAL at 08:09

## 2021-01-01 RX ADMIN — DOBUTAMINE HYDROCHLORIDE 4 MCG/KG/MIN: 200 INJECTION INTRAVENOUS at 11:18

## 2021-01-01 RX ADMIN — LEVOTHYROXINE SODIUM 112 MCG: 0.11 TABLET ORAL at 08:02

## 2021-01-01 RX ADMIN — Medication 10 ML: at 03:45

## 2021-01-01 RX ADMIN — ALBUMIN (HUMAN) 12.5 G: 12.5 INJECTION, SOLUTION INTRAVENOUS at 17:15

## 2021-01-01 RX ADMIN — POTASSIUM CHLORIDE 40 MEQ: 750 TABLET, EXTENDED RELEASE ORAL at 17:17

## 2021-01-01 RX ADMIN — LEVOTHYROXINE SODIUM 112 MCG: 0.11 TABLET ORAL at 08:39

## 2021-01-01 RX ADMIN — Medication 10 ML: at 21:05

## 2021-01-01 RX ADMIN — DEXMEDETOMIDINE 0.8 MCG/KG/HR: 100 INJECTION, SOLUTION, CONCENTRATE INTRAVENOUS at 11:19

## 2021-01-01 RX ADMIN — SODIUM CHLORIDE 0.12 MCG/KG/MIN: 9 INJECTION, SOLUTION INTRAVENOUS at 05:02

## 2021-01-01 RX ADMIN — Medication 10 ML: at 18:56

## 2021-01-01 RX ADMIN — SODIUM CHLORIDE 10 ML/HR: 4.5 INJECTION, SOLUTION INTRAVENOUS at 16:00

## 2021-01-01 RX ADMIN — GLIMEPIRIDE 2 MG: 4 TABLET ORAL at 08:57

## 2021-01-01 RX ADMIN — EPINEPHRINE 1 MG: 0.1 INJECTION, SOLUTION ENDOTRACHEAL; INTRACARDIAC; INTRAVENOUS at 22:37

## 2021-01-01 RX ADMIN — SERTRALINE 50 MG: 50 TABLET, FILM COATED ORAL at 08:40

## 2021-01-01 RX ADMIN — VASOPRESSIN 0.04 UNITS/MIN: 20 INJECTION INTRAVENOUS at 16:40

## 2021-01-01 RX ADMIN — SODIUM BICARBONATE 50 MEQ: 84 INJECTION INTRAVENOUS at 18:47

## 2021-01-01 RX ADMIN — ENOXAPARIN SODIUM 100 MG: 100 INJECTION SUBCUTANEOUS at 21:22

## 2021-01-01 RX ADMIN — MUPIROCIN: 20 OINTMENT TOPICAL at 20:35

## 2021-01-01 RX ADMIN — POTASSIUM CHLORIDE 20 MEQ: 750 TABLET, EXTENDED RELEASE ORAL at 08:39

## 2021-01-01 RX ADMIN — DESMOPRESSIN ACETATE 27 MCG: 4 INJECTION INTRAVENOUS at 13:52

## 2021-01-01 RX ADMIN — EPINEPHRINE 2 MCG/MIN: 1 INJECTION INTRAMUSCULAR; INTRAVENOUS; SUBCUTANEOUS at 16:35

## 2021-01-01 RX ADMIN — SERTRALINE 50 MG: 50 TABLET, FILM COATED ORAL at 10:12

## 2021-01-01 RX ADMIN — ROCURONIUM BROMIDE 70 MG: 10 INJECTION INTRAVENOUS at 08:10

## 2021-01-01 RX ADMIN — PANTOPRAZOLE SODIUM 40 MG: 40 TABLET, DELAYED RELEASE ORAL at 08:02

## 2021-01-01 RX ADMIN — PHENYLEPHRINE HYDROCHLORIDE 75 MCG/MIN: 10 INJECTION INTRAVENOUS at 02:26

## 2021-01-01 RX ADMIN — DOCUSATE SODIUM - SENNOSIDES 1 TABLET: 50; 8.6 TABLET, FILM COATED ORAL at 08:10

## 2021-01-01 RX ADMIN — FENTANYL CITRATE 25 MCG: 50 INJECTION, SOLUTION INTRAMUSCULAR; INTRAVENOUS at 14:32

## 2021-01-01 RX ADMIN — ACETAMINOPHEN 1000 MG: 500 TABLET ORAL at 17:08

## 2021-01-01 RX ADMIN — Medication 50 MCG/HR: at 21:43

## 2021-01-01 RX ADMIN — ALBUTEROL SULFATE 2.5 MG: 2.5 SOLUTION RESPIRATORY (INHALATION) at 11:15

## 2021-01-01 RX ADMIN — Medication 10 ML: at 14:19

## 2021-01-01 RX ADMIN — LEVOTHYROXINE SODIUM 112 MCG: 0.11 TABLET ORAL at 07:40

## 2021-01-01 RX ADMIN — AMINOCAPROIC ACID 1 G/HR: 250 INJECTION, SOLUTION INTRAVENOUS at 13:35

## 2021-01-01 RX ADMIN — ENOXAPARIN SODIUM 100 MG: 100 INJECTION SUBCUTANEOUS at 21:04

## 2021-01-01 RX ADMIN — PROPOFOL 45 MCG/KG/MIN: 10 INJECTION, EMULSION INTRAVENOUS at 09:24

## 2021-01-01 RX ADMIN — ACETAMINOPHEN 650 MG: 325 TABLET ORAL at 11:20

## 2021-01-01 RX ADMIN — METOPROLOL TARTRATE 12.5 MG: 25 TABLET ORAL at 18:22

## 2021-01-01 RX ADMIN — CEFDINIR 300 MG: 300 CAPSULE ORAL at 09:09

## 2021-01-01 RX ADMIN — METOPROLOL TARTRATE 12.5 MG: 25 TABLET ORAL at 17:30

## 2021-01-01 RX ADMIN — SODIUM BICARBONATE 50 MEQ: 84 INJECTION, SOLUTION INTRAVENOUS at 14:35

## 2021-01-01 RX ADMIN — POLYETHYLENE GLYCOL 3350 17 G: 17 POWDER, FOR SOLUTION ORAL at 08:10

## 2021-01-01 RX ADMIN — ENOXAPARIN SODIUM 100 MG: 100 INJECTION SUBCUTANEOUS at 08:55

## 2021-01-01 RX ADMIN — ENOXAPARIN SODIUM 100 MG: 100 INJECTION SUBCUTANEOUS at 22:42

## 2021-01-01 RX ADMIN — ASPIRIN 81 MG CHEWABLE TABLET 81 MG: 81 TABLET CHEWABLE at 10:12

## 2021-01-01 RX ADMIN — FUROSEMIDE 40 MG: 10 INJECTION, SOLUTION INTRAMUSCULAR; INTRAVENOUS at 08:55

## 2021-01-01 RX ADMIN — PANTOPRAZOLE SODIUM 40 MG: 40 TABLET, DELAYED RELEASE ORAL at 08:54

## 2021-01-01 RX ADMIN — LIDOCAINE HYDROCHLORIDE 100 MG: 20 INJECTION, SOLUTION INTRAVENOUS at 08:07

## 2021-01-01 RX ADMIN — Medication 10 ML: at 08:55

## 2021-01-01 RX ADMIN — GLIMEPIRIDE 2 MG: 4 TABLET ORAL at 08:01

## 2021-01-01 RX ADMIN — ACETAMINOPHEN 650 MG: 325 TABLET ORAL at 08:10

## 2021-01-01 RX ADMIN — Medication 3 AMPULE: at 07:00

## 2021-01-01 RX ADMIN — ENOXAPARIN SODIUM 100 MG: 100 INJECTION SUBCUTANEOUS at 07:59

## 2021-01-01 RX ADMIN — PANTOPRAZOLE SODIUM 40 MG: 40 TABLET, DELAYED RELEASE ORAL at 08:40

## 2021-01-01 RX ADMIN — CEFAZOLIN 2 G: 1 INJECTION, POWDER, FOR SOLUTION INTRAMUSCULAR; INTRAVENOUS; PARENTERAL at 11:30

## 2021-01-01 RX ADMIN — SODIUM CHLORIDE 6.1 UNITS/HR: 9 INJECTION, SOLUTION INTRAVENOUS at 19:04

## 2021-01-01 RX ADMIN — PANTOPRAZOLE SODIUM 40 MG: 40 TABLET, DELAYED RELEASE ORAL at 08:00

## 2021-01-01 RX ADMIN — FUROSEMIDE 40 MG: 10 INJECTION, SOLUTION INTRAMUSCULAR; INTRAVENOUS at 08:41

## 2021-01-01 RX ADMIN — FAMOTIDINE 20 MG: 20 TABLET ORAL at 08:10

## 2021-01-01 RX ADMIN — WATER 2 G: 1 INJECTION INTRAMUSCULAR; INTRAVENOUS; SUBCUTANEOUS at 19:29

## 2021-01-01 RX ADMIN — PROPOFOL 50 MCG/KG/MIN: 10 INJECTION, EMULSION INTRAVENOUS at 18:31

## 2021-01-01 RX ADMIN — VASOPRESSIN 0.02 UNITS/MIN: 20 INJECTION INTRAVENOUS at 13:36

## 2021-01-01 RX ADMIN — ACETAMINOPHEN 650 MG: 325 TABLET ORAL at 07:05

## 2021-01-01 RX ADMIN — ASPIRIN 81 MG CHEWABLE TABLET 81 MG: 81 TABLET CHEWABLE at 08:10

## 2021-01-01 RX ADMIN — WATER 2 G: 1 INJECTION INTRAMUSCULAR; INTRAVENOUS; SUBCUTANEOUS at 01:11

## 2021-01-01 RX ADMIN — PROPOFOL 30 MCG/KG/MIN: 10 INJECTION, EMULSION INTRAVENOUS at 14:18

## 2021-01-01 RX ADMIN — AMINOCAPROIC ACID 10 G/HR: 250 INJECTION, SOLUTION INTRAVENOUS at 08:15

## 2021-01-01 RX ADMIN — SERTRALINE 50 MG: 50 TABLET, FILM COATED ORAL at 08:01

## 2021-01-01 RX ADMIN — ONDANSETRON HYDROCHLORIDE 4 MG: 2 INJECTION, SOLUTION INTRAMUSCULAR; INTRAVENOUS at 14:07

## 2021-01-01 RX ADMIN — SODIUM CHLORIDE 4.8 UNITS/HR: 9 INJECTION, SOLUTION INTRAVENOUS at 21:09

## 2021-01-01 RX ADMIN — Medication 75 MCG/HR: at 18:15

## 2021-01-01 RX ADMIN — POTASSIUM CHLORIDE 20 MEQ: 750 TABLET, EXTENDED RELEASE ORAL at 08:54

## 2021-01-01 RX ADMIN — Medication 10 ML: at 13:55

## 2021-01-01 RX ADMIN — SODIUM CHLORIDE: 9 INJECTION, SOLUTION INTRAVENOUS at 14:15

## 2021-01-01 RX ADMIN — DOCUSATE SODIUM - SENNOSIDES 1 TABLET: 50; 8.6 TABLET, FILM COATED ORAL at 17:08

## 2021-01-01 RX ADMIN — METOPROLOL TARTRATE 12.5 MG: 25 TABLET ORAL at 17:17

## 2021-01-01 RX ADMIN — SUFENTANIL CITRATE 0.3 MCG/KG/HR: 50 INJECTION EPIDURAL; INTRAVENOUS at 08:15

## 2021-01-01 RX ADMIN — MORPHINE SULFATE 4 MG: 4 INJECTION INTRAVENOUS at 15:39

## 2021-01-01 RX ADMIN — Medication 10 ML: at 03:42

## 2021-01-01 RX ADMIN — SERTRALINE 50 MG: 50 TABLET, FILM COATED ORAL at 09:04

## 2021-01-01 RX ADMIN — DOBUTAMINE HYDROCHLORIDE 4 MCG/KG/MIN: 400 INJECTION INTRAVENOUS at 17:07

## 2021-01-01 RX ADMIN — MIDAZOLAM HYDROCHLORIDE 1 MG: 1 INJECTION, SOLUTION INTRAMUSCULAR; INTRAVENOUS at 14:32

## 2021-01-01 RX ADMIN — FENTANYL CITRATE 200 MCG: 50 INJECTION INTRAMUSCULAR; INTRAVENOUS at 08:46

## 2021-01-01 RX ADMIN — MORPHINE SULFATE 4 MG: 4 INJECTION INTRAVENOUS at 20:57

## 2021-01-01 RX ADMIN — FUROSEMIDE 40 MG: 10 INJECTION, SOLUTION INTRAMUSCULAR; INTRAVENOUS at 14:20

## 2021-01-01 RX ADMIN — SODIUM CHLORIDE 5.4 UNITS/HR: 9 INJECTION, SOLUTION INTRAVENOUS at 16:56

## 2021-01-01 RX ADMIN — MIDAZOLAM HYDROCHLORIDE 1 MG: 1 INJECTION, SOLUTION INTRAMUSCULAR; INTRAVENOUS at 14:33

## 2021-01-01 RX ADMIN — CEFDINIR 300 MG: 300 CAPSULE ORAL at 12:30

## 2021-01-01 RX ADMIN — ALBUMIN (HUMAN) 250 ML: 2.5 SOLUTION INTRAVENOUS at 14:13

## 2021-01-01 RX ADMIN — PROPOFOL 80 MG: 10 INJECTION, EMULSION INTRAVENOUS at 09:24

## 2021-01-01 RX ADMIN — PHENYLEPHRINE HYDROCHLORIDE 180 MCG/MIN: 10 INJECTION INTRAVENOUS at 19:30

## 2021-01-01 RX ADMIN — CALCIUM CHLORIDE 1 G: 100 INJECTION, SOLUTION INTRAVENOUS at 16:00

## 2021-01-01 RX ADMIN — Medication 10 ML: at 17:37

## 2021-01-01 RX ADMIN — DEXMEDETOMIDINE 0.6 MCG/KG/HR: 100 INJECTION, SOLUTION, CONCENTRATE INTRAVENOUS at 02:28

## 2021-01-01 RX ADMIN — CEFDINIR 300 MG: 300 CAPSULE ORAL at 23:55

## 2021-01-01 RX ADMIN — LEVOTHYROXINE SODIUM 112 MCG: 0.11 TABLET ORAL at 07:59

## 2021-01-01 RX ADMIN — FUROSEMIDE 40 MG: 10 INJECTION, SOLUTION INTRAMUSCULAR; INTRAVENOUS at 10:13

## 2021-01-01 RX ADMIN — FAMOTIDINE 20 MG: 10 INJECTION, SOLUTION INTRAVENOUS at 15:39

## 2021-01-01 RX ADMIN — Medication 10 ML: at 06:25

## 2021-01-01 RX ADMIN — ACETAMINOPHEN 650 MG: 325 TABLET ORAL at 04:40

## 2021-01-01 RX ADMIN — Medication 20 ML: at 06:00

## 2021-01-01 RX ADMIN — METOPROLOL TARTRATE 12.5 MG: 25 TABLET ORAL at 17:08

## 2021-01-01 RX ADMIN — ACETAMINOPHEN 650 MG: 325 TABLET ORAL at 15:24

## 2021-01-01 RX ADMIN — POTASSIUM CHLORIDE 20 MEQ: 750 TABLET, EXTENDED RELEASE ORAL at 08:01

## 2021-01-01 RX ADMIN — GLIMEPIRIDE 2 MG: 4 TABLET ORAL at 09:08

## 2021-01-01 RX ADMIN — ALBUMIN (HUMAN) 250 ML: 2.5 SOLUTION INTRAVENOUS at 14:25

## 2021-01-01 RX ADMIN — SUFENTANIL CITRATE 30 MCG: 50 INJECTION EPIDURAL; INTRAVENOUS at 09:17

## 2021-01-01 RX ADMIN — VASOPRESSIN 0.03 UNITS/MIN: 20 INJECTION INTRAVENOUS at 09:18

## 2021-01-01 RX ADMIN — 0.12% CHLORHEXIDINE GLUCONATE 10 ML: 1.2 RINSE ORAL at 20:35

## 2021-01-01 RX ADMIN — DEXMEDETOMIDINE 0.4 MCG/KG/HR: 100 INJECTION, SOLUTION, CONCENTRATE INTRAVENOUS at 21:48

## 2021-01-01 RX ADMIN — LEVOTHYROXINE SODIUM 112 MCG: 0.11 TABLET ORAL at 08:54

## 2021-01-01 RX ADMIN — PROPOFOL 20 MCG/KG/MIN: 10 INJECTION, EMULSION INTRAVENOUS at 18:15

## 2021-01-01 RX ADMIN — OXYCODONE HYDROCHLORIDE 5 MG: 5 TABLET ORAL at 11:20

## 2021-01-01 RX ADMIN — FENTANYL CITRATE 50 MCG: 50 INJECTION INTRAMUSCULAR; INTRAVENOUS at 07:15

## 2021-01-01 RX ADMIN — DEXAMETHASONE SODIUM PHOSPHATE 10 MG: 4 INJECTION, SOLUTION INTRAMUSCULAR; INTRAVENOUS at 23:56

## 2021-01-01 RX ADMIN — ALBUMIN (HUMAN) 12.5 G: 12.5 INJECTION, SOLUTION INTRAVENOUS at 17:40

## 2021-01-01 RX ADMIN — METOPROLOL TARTRATE 12.5 MG: 25 TABLET ORAL at 17:35

## 2021-01-01 RX ADMIN — ENOXAPARIN SODIUM 100 MG: 100 INJECTION SUBCUTANEOUS at 08:38

## 2021-01-01 RX ADMIN — WATER 2 G: 1 INJECTION INTRAMUSCULAR; INTRAVENOUS; SUBCUTANEOUS at 14:19

## 2021-01-01 RX ADMIN — CALCIUM CHLORIDE 1 G: 100 INJECTION, SOLUTION INTRAVENOUS at 23:06

## 2021-01-01 RX ADMIN — PHENYLEPHRINE HYDROCHLORIDE 70 MCG/MIN: 10 INJECTION INTRAVENOUS at 14:10

## 2021-01-01 RX ADMIN — Medication 20 ML: at 22:00

## 2021-01-01 RX ADMIN — PROPOFOL 50 MCG/KG/MIN: 10 INJECTION, EMULSION INTRAVENOUS at 05:03

## 2021-01-01 RX ADMIN — DEXMEDETOMIDINE 0.3 MCG/KG/HR: 100 INJECTION, SOLUTION, CONCENTRATE INTRAVENOUS at 12:01

## 2021-01-01 RX ADMIN — SODIUM CHLORIDE, POTASSIUM CHLORIDE, SODIUM LACTATE AND CALCIUM CHLORIDE 100 ML/HR: 600; 310; 30; 20 INJECTION, SOLUTION INTRAVENOUS at 06:50

## 2021-01-01 RX ADMIN — AMIODARONE HYDROCHLORIDE 400 MG: 200 TABLET ORAL at 17:08

## 2021-01-01 RX ADMIN — PROTAMINE SULFATE 160 MG: 10 INJECTION, SOLUTION INTRAVENOUS at 13:38

## 2021-01-01 RX ADMIN — ROCURONIUM BROMIDE 30 MG: 10 INJECTION INTRAVENOUS at 12:01

## 2021-01-01 RX ADMIN — LEVOTHYROXINE SODIUM 112 MCG: 0.11 TABLET ORAL at 05:21

## 2021-01-01 RX ADMIN — DOBUTAMINE HYDROCHLORIDE 5 MCG/KG/MIN: 200 INJECTION INTRAVENOUS at 12:48

## 2021-01-01 RX ADMIN — PANTOPRAZOLE SODIUM 40 MG: 40 TABLET, DELAYED RELEASE ORAL at 07:40

## 2021-01-01 RX ADMIN — POTASSIUM CHLORIDE 40 MEQ: 750 TABLET, EXTENDED RELEASE ORAL at 08:54

## 2021-01-01 RX ADMIN — EPINEPHRINE 1 MG: 0.1 INJECTION, SOLUTION ENDOTRACHEAL; INTRACARDIAC; INTRAVENOUS at 23:05

## 2021-01-01 RX ADMIN — CALCIUM CHLORIDE 1 G: 100 INJECTION, SOLUTION INTRAVENOUS at 05:54

## 2021-01-01 RX ADMIN — SODIUM BICARBONATE 50 MEQ: 84 INJECTION, SOLUTION INTRAVENOUS at 16:02

## 2021-01-01 RX ADMIN — PHENYLEPHRINE HYDROCHLORIDE 200 MCG/MIN: 10 INJECTION INTRAVENOUS at 16:35

## 2021-01-01 RX ADMIN — ASPIRIN 81 MG CHEWABLE TABLET 81 MG: 81 TABLET CHEWABLE at 08:39

## 2021-01-01 RX ADMIN — PROPOFOL 120 MG: 10 INJECTION, EMULSION INTRAVENOUS at 08:10

## 2021-01-01 RX ADMIN — FUROSEMIDE 20 MG: 10 INJECTION, SOLUTION INTRAMUSCULAR; INTRAVENOUS at 17:08

## 2021-01-01 RX ADMIN — PROPOFOL 30 MCG/KG/MIN: 10 INJECTION, EMULSION INTRAVENOUS at 21:48

## 2021-01-01 RX ADMIN — ASPIRIN 81 MG CHEWABLE TABLET 81 MG: 81 TABLET CHEWABLE at 09:04

## 2021-01-01 RX ADMIN — EPINEPHRINE 1 MG: 0.1 INJECTION, SOLUTION ENDOTRACHEAL; INTRACARDIAC; INTRAVENOUS at 23:08

## 2021-01-01 RX ADMIN — METOPROLOL TARTRATE 12.5 MG: 25 TABLET ORAL at 08:40

## 2021-01-01 RX ADMIN — CEFAZOLIN 2 G: 1 INJECTION, POWDER, FOR SOLUTION INTRAMUSCULAR; INTRAVENOUS; PARENTERAL at 14:26

## 2021-01-01 RX ADMIN — EPINEPHRINE 3 MCG/MIN: 1 INJECTION INTRAMUSCULAR; INTRAVENOUS; SUBCUTANEOUS at 12:48

## 2021-01-01 RX ADMIN — PROPOFOL 50 MCG/KG/MIN: 10 INJECTION, EMULSION INTRAVENOUS at 22:47

## 2021-01-01 RX ADMIN — ACETAMINOPHEN 650 MG: 325 TABLET ORAL at 20:37

## 2021-01-01 RX ADMIN — FAMOTIDINE 20 MG: 10 INJECTION, SOLUTION INTRAVENOUS at 20:36

## 2021-02-24 NOTE — ED PROVIDER NOTES
EMERGENCY DEPARTMENT HISTORY AND PHYSICAL EXAM  
 
------------------------------------------------------------------------------------------------------ Please note that this dictation was completed with [employer], the [employer] voice recognition software. Quite often unanticipated grammatical, syntax, homophones, and other interpretive errors are inadvertently transcribed by the computer software. Please disregard these errors. Please excuse any errors that have escaped final proofreading. 
----------------------------------------------------------------------------------------------------------------- 
 
Date: 2/23/2021 Patient Name: Cely Ahuja History of Presenting Illness Chief Complaint Patient presents with  Positive For Covid-19  Cough Patient was [facility] from [facility]   
 
 
History Provided By: Patient HPI: Cely Ahuja is a 76 y.o. female, with significant pmhx of COVID-19 infection, recent new diagnosis of atrial fibrillation currently on Eliquis, diabetes, hypertension who presents via private vehicle to the ED with c/o fatigue and shortness of breath over the last several days. [facility] today was diagnosed with COVID-19 after rapid testing. Patient reports she was referred to the emergency department for further evaluation by patient care as they were concerned about her chest x-ray findings. Patient reports having increased fatigue with minimal activity. No fever today. Reports that her son purchased a pulse oximeter for her to use at home earlier today. Pt also specifically denies any recent fevers, chills, CP, nausea, vomiting, diarrhea, abd pain, changes in BM, urinary sxs, or headache. PCP: Janette Trevino MD 
 
Social Hx: denies tobacco, denies EtOH, denies Illicit Drugs There are no other complaints, changes, or physical findings at this time. Allergies Allergen Reactions  Latex Rash  Antihistamine [Diphenhydramine Hcl] Unknown (comments)  Iodine Rash  Novocain [Procaine] Palpitations Current Facility-Administered Medications Medication Dose Route Frequency Provider Last Rate Last Admin  dexamethasone (DECADRON) 4 mg/mL injection 10 mg  10 mg IntraVENous NOW Dianne Gomez MD      
 cefdinir (OMNICEF) capsule 300 mg  300 mg Oral Q12H Dianne Gomez MD      
 
Current Outpatient Medications Medication Sig Dispense Refill  predniSONE (STERAPRED DS) 10 mg dose pack Take as directed on packaging 48 Tab 0  
 albuterol (PROVENTIL HFA, VENTOLIN HFA, PROAIR HFA) 90 mcg/actuation inhaler Take 2 Puffs by inhalation every four (4) hours as needed for Wheezing. 1 Inhaler 0  
 cefdinir (OMNICEF) 300 mg capsule Take 1 Cap by mouth two (2) times a day for 10 days. 20 Cap 0  
 metoprolol tartrate (LOPRESSOR) 25 mg tablet Take 0.5 Tabs by mouth two (2) times a day. 30 Tab 11  
 metFORMIN (GLUCOPHAGE) 500 mg tablet TAKE 1 TAB BY MOUTH TWO (2) TIMES DAILY (WITH MEALS). 180 Tab 1  valsartan-hydroCHLOROthiazide (DIOVAN-HCT) 80-12.5 mg per tablet TAKE 1 TABLET EVERY DAY 90 Tab 1  
 sertraline (ZOLOFT) 50 mg tablet TAKE 1 TAB BY MOUTH DAILY. 90 Tab 1  
 levothyroxine (SYNTHROID) 112 mcg tablet TAKE 1 TABLET BY MOUTH EVERY DAY BEFORE BREAKFAST 90 Tab 1  
 glimepiride (AMARYL) 2 mg tablet TAKE 1 TABLET BY MOUTH EVERY DAY IN THE MORNING 90 Tab 1  
 flecainide (TAMBOCOR) 50 mg tablet Take 1 Tab by mouth NON-INV CARD ONCE. 60 Tab 11  
 apixaban (Eliquis) 5 mg tablet Take 5 mg by mouth two (2) times a day.  cholecalciferol, VITAMIN D3, (VITAMIN D3) 5,000 unit tab tablet Take  by mouth daily.  letrozole (FEMARA) 2.5 mg tablet   11  
 Omeprazole delayed release (PRILOSEC D/R) 20 mg tablet Take 20 mg by mouth daily. Past History Past Medical History: 
Past Medical History:  
Diagnosis Date Renata Flores [facility]) September 9th 2020  Abscess of chest wall 8/10/2017  Acquired hypothyroidism 2018  Acute asthmatic bronchitis 8/10/2017  Breast cancer ([facility] 2015 Rt mastectomy  Cancer ([facility] right breast  
 Cigarette nicotine dependence in remission 2017  Diabetes ([facility]  Dyspepsia and other specified disorders of function of stomach  Family history of colon cancer in mother 2018  GERD (gastroesophageal reflux disease)  GERD without esophagitis 2017  Hyperglycemia 8/10/2017  Hypertension  Impacted cerumen of right ear 8/10/2017  Mood swings 8/10/2017  Nausea & vomiting  Obesity 8/10/2017  Urticaria 8/10/2017  Vitamin D deficiency 2018 Past Surgical History: 
Past Surgical History:  
Procedure Laterality Date [address]  HX BREAST BIOPSY Right 2016 RIGHT BREAST DEBRIDEMENT OF MASTECTOMY SITE performed by Johnny Peterson MD at [facility]  
 tonsillectomy  HX MASTECTOMY Right 12/3/2015 RIGHT BREAST SIMPLE MASTECTOMY WITH RIGHT SENTINEL NODE BIOPSY performed by Johnny Peterson MD at [facility] AMBULATORY OR Family History: 
Family History Problem Relation Age of Onset  Cancer Father   
     lung  Osteoporosis Mother  Arthritis-osteo Sister  Cancer Paternal Aunt   
     breast cancer, late 60's-early 66's Social History: 
Social History Tobacco Use  Smoking status: Former Smoker Packs/day: 1.00 Years: 30.00 Pack years: 30.00 Types: Cigarettes Quit date: 12/3/2015 Years since quittin.2  Smokeless tobacco: Never Used  Tobacco comment: vaped for one year after quitting smoking Substance Use Topics  Alcohol use: No  
  Alcohol/week: 0.0 standard drinks  Drug use: No  
 
 
Allergies: Allergies Allergen Reactions  Latex Rash  Antihistamine [Diphenhydramine Hcl] Unknown (comments)  Iodine Rash  Novocain [Procaine] Palpitations Review of Systems Review of Systems Constitutional: Positive for fatigue and fever. Eyes: Negative. Respiratory: Positive for shortness of breath. Cardiovascular: Negative for chest pain. Gastrointestinal: Negative for abdominal pain, nausea and vomiting. Endocrine: Negative. Genitourinary: Negative. Negative for difficulty urinating, dysuria and hematuria. Musculoskeletal: Negative. Skin: Negative. Neurological: Negative. Psychiatric/Behavioral: Negative for suicidal ideas. All other systems reviewed and are negative. Physical Exam  
Physical Exam 
Vitals signs and nursing note reviewed. Constitutional:   
   General: She is not in acute distress. Appearance: She is well-developed. She is not diaphoretic. HENT:  
   Head: Normocephalic and atraumatic. Nose: Nose normal.  
Eyes:  
   General: No scleral icterus. Conjunctiva/sclera: Conjunctivae normal.  
Neck: Musculoskeletal: Normal range of motion. Trachea: No tracheal deviation. Cardiovascular:  
   Rate and Rhythm: Normal rate and regular rhythm. Heart sounds: Normal heart sounds. No murmur. No friction rub. Pulmonary:  
   Effort: Pulmonary effort is normal. No respiratory distress. Breath sounds: Normal breath sounds. No stridor. No wheezing or rales. Abdominal:  
   General: Bowel sounds are normal. There is no distension. Palpations: Abdomen is soft. Tenderness: There is no abdominal tenderness. There is no rebound. Musculoskeletal: Normal range of motion. General: No tenderness. Skin: 
   General: Skin is warm and dry. Findings: No rash. Neurological:  
   Mental Status: She is alert and oriented to person, place, and time. Cranial Nerves: No cranial nerve deficit. Psychiatric:     
   Speech: Speech normal.     
   Behavior: Behavior normal.     
   Thought Content: Thought content normal.     
   Judgment: Judgment normal.  
 
 
 
 
Diagnostic Study Results Labs - Recent Results (from the past 12 hour(s)) CBC WITH AUTOMATED DIFF Collection Time: 02/23/21  6:02 PM  
Result Value Ref Range WBC 6.0 3.6 - 11.0 K/uL  
 RBC 4.75 3.80 - 5.20 M/uL  
 HGB 14.6 11.5 - 16.0 g/dL HCT 44.3 35.0 - 47.0 % MCV 93.3 80.0 - 99.0 FL  
 MCH 30.7 26.0 - 34.0 PG  
 MCHC 33.0 30.0 - 36.5 g/dL  
 RDW 12.3 11.5 - 14.5 % PLATELET 883 568 - 081 K/uL MPV 10.3 8.9 - 12.9 FL  
 NRBC 0.0 0  WBC ABSOLUTE NRBC 0.00 0.00 - 0.01 K/uL NEUTROPHILS 74 32 - 75 % LYMPHOCYTES 15 12 - 49 % MONOCYTES 10 5 - 13 % EOSINOPHILS 0 0 - 7 % BASOPHILS 0 0 - 1 % IMMATURE GRANULOCYTES 1 (H) 0.0 - 0.5 % ABS. NEUTROPHILS 4.4 1.8 - 8.0 K/UL  
 ABS. LYMPHOCYTES 0.9 0.8 - 3.5 K/UL  
 ABS. MONOCYTES 0.6 0.0 - 1.0 K/UL  
 ABS. EOSINOPHILS 0.0 0.0 - 0.4 K/UL  
 ABS. BASOPHILS 0.0 0.0 - 0.1 K/UL  
 ABS. IMM. GRANS. 0.0 0.00 - 0.04 K/UL  
 DF AUTOMATED METABOLIC PANEL, COMPREHENSIVE Collection Time: 02/23/21  6:02 PM  
Result Value Ref Range Sodium 133 (L) 136 - 145 mmol/L Potassium 4.1 3.5 - 5.1 mmol/L Chloride 101 97 - 108 mmol/L  
 CO2 22 21 - 32 mmol/L Anion gap 10 5 - 15 mmol/L Glucose 129 (H) 65 - 100 mg/dL BUN 27 (H) 6 - 20 MG/DL Creatinine 1.82 (H) 0.55 - 1.02 MG/DL  
 BUN/Creatinine ratio 15 12 - 20 GFR est AA 34 (L) >60 ml/min/1.73m2 GFR est non-AA 28 (L) >60 ml/min/1.73m2 Calcium 9.0 8.5 - 10.1 MG/DL Bilirubin, total 0.5 0.2 - 1.0 MG/DL  
 ALT (SGPT) 31 12 - 78 U/L  
 AST (SGOT) 27 15 - 37 U/L Alk. phosphatase 55 45 - 117 U/L Protein, total 8.0 6.4 - 8.2 g/dL Albumin 3.5 3.5 - 5.0 g/dL Globulin 4.5 (H) 2.0 - 4.0 g/dL A-G Ratio 0.8 (L) 1.1 - 2.2    
TROPONIN I Collection Time: 02/23/21  6:02 PM  
Result Value Ref Range Troponin-I, Qt. <0.05 <0.05 ng/mL EKG, 12 LEAD, INITIAL Collection Time: 02/23/21  6:04 PM  
Result Value Ref Range  Ventricular Rate 101 BPM  
 Atrial Rate 150 BPM  
 QRS Duration 150 ms  
 Q-T Interval 396 ms QTC Calculation (Bezet) 513 ms Calculated R Axis 78 degrees Calculated T Axis 37 degrees Diagnosis Atrial fibrillation with rapid ventricular response Right bundle branch block When compared with ECG of 29-OCT-2020 13:30, Atrial fibrillation has replaced Sinus rhythm Vent. rate has increased BY  46 BPM 
Right bundle branch block is now present Criteria for Septal infarct are no longer present Radiologic Studies -  
XR CHEST PORT Final Result Mild, diffuse bilateral increased interstitial markings. CT Results  (Last 48 hours) None CXR Results  (Last 48 hours) 02/23/21 1842  XR CHEST PORT Final result Impression:  Mild, diffuse bilateral increased interstitial markings. Narrative:  INDICATION: Cough, shortness of breath, Covid19 positive. Portable AP upright view of the chest.  
   
Direct comparison made to prior chest x-ray dated September 12, 2020. Cardiomediastinal silhouette is stable. There are mild diffuse bilateral  
increased interstitial markings. No pleural fluid is visualized. There is no  
pneumothorax. Medical Decision Making I am the first provider for this patient. I reviewed the vital signs, available nursing notes, past medical history, past surgical history, family history and social history. Vital Signs-Reviewed the patient's vital signs. Patient Vitals for the past 12 hrs: 
 Temp Pulse Resp BP SpO2  
02/23/21 2033 98.1 °F (36.7 °C) 92 20 (!) 100/57 95 % 02/23/21 1749 97.9 °F (36.6 °C) 69 20 102/60 94 % Pulse Oximetry Analysis - 95% on RA normal 
 
Records Reviewed/Interpretted: Nursing Notes from triage and Old Medical Records, noting recent admission for new onset atrial fibrillation Provider Notes (Medical Decision Making): DDX: 
COVID-19, pneumonia, hypoxia, electrolyte abnormality Plan: Labs, chest x-ray, pulse ox ambulation trial 
 
Impression: 
COVID-19 pneumonia ED Course:  
Initial assessment performed. The patients presenting problems have been discussed, and they are in agreement with the care plan formulated and outlined with them. I have encouraged them to ask questions as they arise throughout their visit. I reviewed our electronic medical record system for any past medical records that were available that may contribute to the patients current condition, the nursing notes and and vital signs from today's visit Nursing notes will be reviewed as they become available in realtime while the pt has been in the ED. Markus Kapoor MD 
 
 
I personally reviewed/interpreted pt's imaging. Agree with official read by radiology as noted above. Markus Kapoor MD 
 
 
11:03 PM 
Progress note: 
Pt noted to be feeling better, able to ambulate throughout the department without the satting below 95% ready for discharge. Discussed lab and imaging findings with pt, specifically noting chest x-ray findings consistent with Covid pneumonia. Pt will follow up with primary care or this emergency department as instructed. All questions have been answered, pt voiced understanding and agreement with plan. Specific return precautions provided in addition to instructions for pt to return to the ED immediately should sx worsen at any time. Markus Kapoor MD 
 
 
  
 
 
Critical Care Time:  
 
none Diagnosis Clinical Impression: 1. Pneumonia due to COVID-19 virus 2. SOB (shortness of breath) 3. Malaise and fatigue PLAN: 
1. Current Discharge Medication List  
  
START taking these medications Details  
predniSONE (STERAPRED DS) 10 mg dose pack Take as directed on packaging 
Qty: 48 Tab, Refills: 0  
  
albuterol (PROVENTIL HFA, VENTOLIN HFA, PROAIR HFA) 90 mcg/actuation inhaler Take 2 Puffs by inhalation every four (4) hours as needed for Wheezing. Qty: 1 Inhaler, Refills: 0  
  
cefdinir (OMNICEF) 300 mg capsule Take 1 Cap by mouth two (2) times a day for 10 days. Qty: 20 Cap, Refills: 0  
  
  
 
2. Follow-up Information Follow up With Specialties Details Why Contact Info Piter Trevino MD Family Medicine Schedule an appointment as soon as possible for a visit in 2 days  [address] [place] [address] [address] [place] [address] 
[phone] Return to ED if worse Disposition: 
 
11:04 PM  
The patient's results have been reviewed with family and/or caregiver. They verbally convey their understanding and agreement of the patient's signs, symptoms, diagnosis, treatment and prognosis and additionally agree to follow up as recommended in the discharge instructions or to return to the Emergency Room should the patient's condition change prior to their follow-up appointment. The family and/or caregiver verbally agrees with the care-plan and all of their questions have been answered. The discharge instructions have also been provided to the them with educational information regarding the patient's diagnosis as well a list of reasons why the patient would want to return to the ER prior to their follow-up appointment should their condition change.  
Juancho Shelby MD

## 2021-02-24 NOTE — ED NOTES
Ambulated patient with pulse ox. Patient tolerated well, said she felt only slightly SOB, O2 stayed above 96%. MD notified.

## 2021-02-24 NOTE — ED NOTES
Don Ortega MD reviewed discharge instructions with the patient. The patient verbalized understanding. All questions and concerns were addressed. The patient is discharged ambulatory with instructions and prescriptions in hand. Pt is alert and oriented x 4. Respirations are clear and unlabored.

## 2021-02-26 NOTE — PROGRESS NOTES
Ambulatory Care Management Note 
 
Date/Time:  2021 8:40 AM 
 
This patient was received as a referral from Daily Assignment Ambulatory Care Manager outreached to patient today to offer care management services. Introduction to self and role of care manager provided. Patient accepted care management services at this time. Follow up call scheduled at this time. Patient has Ambulatory Care Manager's contact number for for any questions or concerns. Patient contacted regarding [id number] diagnosis\". Discussed COVID-19 related testing which was available at this time. Test results were positive. Patient informed of results, if available? yes Care Transition Nurse/ Ambulatory Care Manager contacted the patient by telephone to perform post discharge assessment. Call within 2 business days of discharge: Yes Verified name and  with patient as identifiers. Provided introduction to self, and explanation of the CTN/ACM role, and reason for call due to risk factors for infection and/or exposure to COVID-19. Symptoms reviewed with patient who verbalized the following symptoms: shortness of breath Due to no new or worsening symptoms encounter was not routed to provider for escalation. Discussed follow-up appointments. If no appointment was previously scheduled, appointment scheduling offered:  yes [facility] follow up appointment(s): No future appointments. Non-[facility] follow up appointment(s): Patient [facility], and was diagnosed with a rapid test as being positive for Covid-19. Patient says she will call for a follow up visit. Advance Care Planning:  
Does patient have an Advance Directive: [address] on file . Patient has following risk factors of: asthma, diabetes. CTN/ACM reviewed discharge instructions, medical action plan and red flags such as increased shortness of breath, increasing fever and signs of decompensation with patient who verbalized understanding. Discussed exposure protocols and quarantine with CDC Guidelines What to do if you are sick with coronavirus disease 2019.  Patient was given an opportunity for questions and concerns. The patient agrees to contact the [facility] exposure line [phone], local [facility] department [facility]  ([phone] and PCP office for questions related to their healthcare. CTN/ACM provided contact information for future needs. Reviewed and educated patient on any new and changed medications related to discharge diagnosis Patient/family/caregiver given information for [employer] and agrees to enroll yes Patient's preferred e-mail:   
Patient's preferred phone number: [phone] Based on Loop alert triggers, patient will be contacted by nurse care manager for worsening symptoms. Pt will be further monitored by COVID Loop Team based on severity of symptoms and risk factors.

## 2021-03-15 NOTE — PROGRESS NOTES
Ambulatory Care Management Note 
 
Date/Time:  3/15/2021 7:00 PM 
 
This Ambulatory Care Manager (ACM) reviewed and updated the following screenings during this call; general assessment, disease specific assessment, self management assessment, ACP assessment and note, medication reconciliation. Patient's challenges to self management identified:   functional physical ability Medication Management:  good understanding Advance Care Planning:  
Does patient have an Advance Directive:  not on file; will address during future outreach [employer], Referrals, and Durable Medical Equipment:  
 
 
Health Maintenance Due Topic Date Due  Eye Exam Retinal or Dilated  Never done  COVID-19 Vaccine (1) Never done  Shingrix Vaccine Age 50> (1 of 2) Never done  Colorectal Cancer Screening Combo  Never done  Breast Cancer Screen Mammogram  10/25/2017  GLAUCOMA SCREENING Q2Y  Never done  Bone Densitometry (Dexa) Screening  Never done  Medicare Yearly Exam  Never done  Foot Exam Q1  02/24/2021  A1C test (Diabetic or Prediabetic)  02/25/2021  MICROALBUMIN Q1  02/27/2021  Lipid Screen  02/25/2021 Health Maintenance reviewed - yes. Patient was asked to consider health care goals that they would like to focus on with this ACM. ACM will follow up with patient to discuss goals and establish care plan in the next 7-14 days. PCP/Specialist follow up: No future appointments. Cardiology follow up scheduled for 3/23/21 Yes - the patient is able to be screened

## 2021-03-31 NOTE — PROGRESS NOTES
Ambulatory Care Management Note 
 
 
Date/Time:  3/31/2021 3:34 PM 
 
Top Challenges reviewed with the provider · After a positive Covid test, last result was Negative · For another Covid test before her Ablation, which was initially canceled due to positive Covid · Admit to not checking BS-123 on 2/23/21 · Last ED visit on 2/23/21 - [facility] ED 2/23/21 for Cough,Fever,Runny Nose Ambulatory  contacted patient for discussion and case management of A-Fib Summary of patients top problems:  
1. A-fib- Re-Scheduled for Ablation, had to cancel on 3/23/21 due to Covid 2. Anxiety- concern about spouse who is having a triple bypass in a few weeks. PCP/Specialist follow up: No future appointments. Goals Addressed This Visit's Progress  Patient/Family verbalizes understanding of self-management of chronic disease. 03/31/21 A-FIB  CCM assessment completed. Admits to feeling well. Says she has been able to manage well. Some weakness still due to having had Covid.  Denies palpitations  Covid education provided, aware of guidelines  Re-sheduled Ablation-due to positive Covid [facility] has rescheduled procedure  Admits to taking all medication as ordered  Discussed Red Flags for A-Fib · These may include: 
? Chest pain or pressure, or a strange feeling in the chest. 
? Sweating. ? Shortness of breath. ? Nausea or vomiting. ? Pain, pressure, or a strange feeling in the back, neck, jaw, or upper belly or in one or both shoulders or arms. ? Lightheadedness or sudden weakness. ? A fast or irregular heartbeat.  Encouraged to call doctor or nurse if Red flags.  ACM will follow up in 7-14 days. Contact information given. Pmk. Patient verbalized understanding of all information discussed. Patient has this Nurse Navigators contact information for any further questions, concerns, or needs.

## 2021-04-20 NOTE — PROGRESS NOTES
Goals Addressed This Visit's Progress  Patient verbalizes understanding of self -management goals of living with Diabetes. 04/20/21  Admits to not checking blood sugars recently due to 's surgery and not taking the time for herself  Will start back with blood sugar monitoring at least daily  Will keep log of results for provider evaluation.  ACM will follow up in 7-10 days. Contact information given. Pmk.  Patient/Family verbalizes understanding of self-management of chronic disease. 03/31/21 A-FIB  CCM assessment completed. Admits to feeling well. Says she has been able to manage well. Some weakness still due to having had Covid.  Denies palpitations  Covid education provided, aware of guidelines  Re-sheduled Ablation-due to positive Covid 24 [facility] Cardiology has rescheduled procedure  Admits to taking all medication as ordered  Discussed Red Flags for A-Fib · These may include: 
? Chest pain or pressure, or a strange feeling in the chest. 
? Sweating. ? Shortness of breath. ? Nausea or vomiting. ? Pain, pressure, or a strange feeling in the back, neck, jaw, or upper belly or in one or both shoulders or arms. ? Lightheadedness or sudden weakness. ? A fast or irregular heartbeat.  Encouraged to call doctor or nurse if Red flags.  ACM will follow up in 7-14 days. Contact information given. Pmk. 
 
04/20/21  Outreach to patient today for CCM self are assessment  Patient says she has been busy with  who had a triple bypass, and is now home from hospital. 
 She will attend follow up with Cardiology on 4/21/21 for evaluation for Ablation, which was canceled due to her 's surgery  Denies any Red Flags  Continue to take all medications as scheduled  Will schedule PCP follow up after seen by Cardiology  ACM will follow up in 7-10 days. Contact information given.  Pmk.

## 2021-05-20 NOTE — PROGRESS NOTES
Chief Complaint Patient presents with  Medication Evaluation Patient is evaluated via phone call only. Patient reports that she and her entire family had Covid in February. Patient has had lingering symptoms, has followed up with the pulmonologist.  Patient reports that her  has had cardiac symptoms. Pt is on oxygen full time, 4L 24/7 or as needed. Pt has also seen cardiology. Pt needs refills on all medication other than cardiac med, handled by specialist.  
 
Marshall Keating is a 76 y.o. female, evaluated via audio-only technology on 5/20/2021 for Medication Evaluation Luz Washington Assessment & Plan:  
Diagnoses and all orders for this visit: 1. Vitamin D deficiency 
-     VITAMIN D, 25 HYDROXY; Future 2. Bronchitis, mucopurulent recurrent ([facility] -Continue to follow-up with pulmonology as scheduled 3. Obesity, morbid ([facility] -Continue to work on diet and exercise 4. Malignant neoplasm of right female breast, unspecified estrogen receptor status, unspecified site of breast ([facility] -Followed by oncology 5. Type 2 diabetes mellitus without complication, without long-term current use of insulin ([facility] -     METABOLIC PANEL, COMPREHENSIVE; Future -     CBC W/O DIFF; Future -     LIPID PANEL; Future 
-     HEMOGLOBIN A1C WITH EAG; Future 
-     glimepiride (AMARYL) 2 mg tablet; Take 1 Tablet by mouth every morning. 
-     metFORMIN (GLUCOPHAGE) 500 mg tablet; TAKE 1 TAB BY MOUTH TWO (2) TIMES DAILY (WITH MEALS). -     MICROALBUMIN, UR, RAND W/ MICROALB/CREAT RATIO; Future 6. Acquired hypothyroidism 
-     TSH 3RD GENERATION; Future -     levothyroxine (SYNTHROID) 112 mcg tablet; TAKE 1 TABLET BY MOUTH EVERY DAY BEFORE BREAKFAST 7. Essential hypertension 
-     valsartan-hydroCHLOROthiazide (DIOVAN-HCT) 80-12.5 mg per tablet; TAKE 1 TABLET BY MOUTH EVERY DAY 
 
8. Mood swings 
-     sertraline (ZOLOFT) 50 mg tablet; TAKE 1 TABLET BY MOUTH EVERY DAY 
 
9. Urinary frequency - AMB POC URINALYSIS DIP STICK AUTO W/O MICRO; Future 12 Subjective:  
 
 
Prior to Admission medications Medication Sig Start Date End Date Taking? Authorizing Provider  
valsartan-hydroCHLOROthiazide (DIOVAN-HCT) 80-12.5 mg per tablet TAKE 1 TABLET BY MOUTH EVERY DAY 5/20/21  Yes Bartolo Trevino MD  
glimepiride (AMARYL) 2 mg tablet Take 1 Tablet by mouth every morning. 5/20/21  Yes Bartolo Trevino MD  
sertraline (ZOLOFT) 50 mg tablet TAKE 1 TABLET BY MOUTH EVERY DAY 5/20/21  Yes Bartolo Trevino MD  
metFORMIN (GLUCOPHAGE) 500 mg tablet TAKE 1 TAB BY MOUTH TWO (2) TIMES DAILY (WITH MEALS). 5/20/21  Yes Bartolo Trevino MD  
levothyroxine (SYNTHROID) 112 mcg tablet TAKE 1 TABLET BY MOUTH EVERY DAY BEFORE BREAKFAST 5/20/21  Yes Bartolo Trevino MD  
metoprolol tartrate (LOPRESSOR) 25 mg tablet Take 0.5 Tabs by mouth two (2) times a day. 10/29/20  Yes Gala Valerio III, DO  
flecainide (TAMBOCOR) 50 mg tablet Take 1 Tab by mouth NON-INV CARD ONCE. 9/15/20  Yes Gala Valerio III, DO  
apixaban (Eliquis) 5 mg tablet Take 5 mg by mouth two (2) times a day. Yes Other, MD Sabine  
cholecalciferol, VITAMIN D3, (VITAMIN D3) 5,000 unit tab tablet Take  by mouth daily. Yes Provider, Historical  
letrozole [facility]) 2.5 mg tablet  7/23/16  Yes Provider, Historical  
Omeprazole delayed release (PRILOSEC D/R) 20 mg tablet Take 20 mg by mouth daily. Yes Provider, Historical  
 
Allergies Allergen Reactions  Latex Rash  Antihistamine [Diphenhydramine Hcl] Unknown (comments)  Iodine Rash  Novocain [Procaine] Palpitations Review of Systems Constitutional: Negative for chills, fever and malaise/fatigue. HENT: Negative for congestion and sore throat. Respiratory: Positive for cough and shortness of breath. Cardiovascular: Negative for chest pain and palpitations.   
Gastrointestinal: Negative for abdominal pain, heartburn, nausea and vomiting. Genitourinary: Negative for dysuria and urgency. Musculoskeletal: Negative for joint pain and myalgias. Neurological: Negative for dizziness, tingling and headaches. All other systems reviewed and are negative. Patient-Reported Vitals 5/20/2021 Patient-Reported Systolic  462 Patient-Reported Diastolic 72 Moose Mack, who was evaluated through a patient-initiated, synchronous (real-time) audio only encounter, and/or her healthcare decision maker, is aware that it is a billable service, with coverage as determined by her insurance carrier. She provided verbal consent to proceed: Yes. She has not had a related appointment within my department in the past 7 days or scheduled within the next 24 hours. Total Time: minutes: 21-30 minutes Tatiana Rainey MD

## 2021-05-20 NOTE — PROGRESS NOTES
1. Have you been to the ER, urgent care clinic since your last visit? Hospitalized since your last visit? Yes, [facility] in [place] for Amor. 2. Have you seen or consulted any other health care providers outside of the [address] [place] [address] since your last visit? Include any pap smears or colon screening. No 
 
 
Health Maintenance Due Topic Date Due  Eye Exam Retinal or Dilated  Never done  COVID-19 Vaccine (1) Never done  Shingrix Vaccine Age 50> (1 of 2) Never done  Colorectal Cancer Screening Combo  Never done  Breast Cancer Screen Mammogram  10/25/2017  Bone Densitometry (Dexa) Screening  Never done  Medicare Yearly Exam  Never done  Foot Exam Q1  02/24/2021  A1C test (Diabetic or Prediabetic)  02/25/2021  Lipid Screen  02/25/2021  MICROALBUMIN Q1  02/27/2021 Chief Complaint Patient presents with  Medication Evaluation

## 2021-05-21 NOTE — PROGRESS NOTES
Goals Addressed This Visit's Progress  Patient verbalizes understanding of self -management goals of living with Diabetes. 04/20/21  Admits to not checking blood sugars recently due to 's surgery and not taking the time for herself  Will start back with blood sugar monitoring at least daily  Will keep log of results for provider evaluation.  ACM will follow up in 7-10 days. Contact information given. Pmk. 
 
05/05/21  Says her BS is doing well  Not checking daily, says she was told she did not have to check daily  Today's result was 131  Will continue with sensible diet  Aware of community resources if needed  Will notify provider if symptoms of hypo/hyperglycemia.pmk 
 
05/21/21  Outreach completed, says she will be getting labs done next week  Not checking blood sugars, will see how HA1C looks after labs  Continue to eat sensible diet  Will attend follow up on 5/24/21. pmk  Patient/Family verbalizes understanding of self-management of chronic disease. 03/31/21 A-FIB  CCM assessment completed. Admits to feeling well. Says she has been able to manage well. Some weakness still due to having had Covid.  Denies palpitations  Covid education provided, aware of guidelines  Re-sheduled Ablation-due to positive Covid [employer] [facility] has rescheduled procedure  Admits to taking all medication as ordered  Discussed Red Flags for A-Fib · These may include: 
? Chest pain or pressure, or a strange feeling in the chest. 
? Sweating. ? Shortness of breath. ? Nausea or vomiting. ? Pain, pressure, or a strange feeling in the back, neck, jaw, or upper belly or in one or both shoulders or arms. ? Lightheadedness or sudden weakness. ? A fast or irregular heartbeat.  Encouraged to call doctor or nurse if Red flags.  ACM will follow up in 7-14 days. Contact information given. Pmk. 
 
04/20/21  Outreach to patient today for CCM self are assessment  Patient says she has been busy with  who had a triple bypass, and is now home from hospital. 
 She will attend follow up with Cardiology on 4/21/21 for evaluation for Ablation, which was canceled due to her 's surgery  Denies any Red Flags  Continue to take all medications as scheduled  Will schedule PCP follow up after seen by Cardiology  ACM will follow up in 7-10 days. Contact information given. Pmk. 
 
05/05/21  Outreach completed  Continue to use O2 at home prn  Attended Cardiology follow up on 4/21/21  Will call for PCP and Cardiology follow up for next assessment. Pmk 
 
05/21/21  Had virtual PCP follow up on 5/20/21  Remain on O2 at 4L/M at home  Has followed up with Cardiology and [address] [place] [address] Knik her entire family is still recovering from [place],  doing much better  Will get Covid vaccines in couple weeks  Scheduled to follow up with PCP again on 5/24/21. pmk

## 2021-05-26 NOTE — PROGRESS NOTES
Thyroid remains underactive, recommend increasing Synthroid dose. Please advise if patient agrees. Blood sugar control significantly improved. All other labs are within normal limits. Please inform

## 2021-05-27 NOTE — PROGRESS NOTES
Pt states she was not taking Synthroid medication for about a month. She states she would like to continue on her regular dose to see if there's any improvement.

## 2021-05-28 PROBLEM — I25.119 CORONARY ARTERY DISEASE INVOLVING NATIVE CORONARY ARTERY OF NATIVE HEART WITH ANGINA PECTORIS (HCC): Status: ACTIVE | Noted: 2021-01-01

## 2021-05-28 PROBLEM — I05.0 MITRAL STENOSIS: Status: ACTIVE | Noted: 2021-01-01

## 2021-05-28 PROBLEM — I34.2 NONRHEUMATIC MITRAL VALVE STENOSIS: Status: ACTIVE | Noted: 2021-01-01

## 2021-05-28 NOTE — H&P
Admission NAME: Ruddy Burleson :  1955 MRN:  [id number] Date/Time:  2021 1:32 PM 
 
Patient PCP: Janette Vargas MD 
________________________________________________________________________ Assessment: 1. Severe mitral stenosis 2. CAD (LAD); normal LVEF 
 
:  Doing worse; had COVID  and now is profoundly short of breath with leg swelling. Says her sats will drop into the 50s at night. she is not on O2. She denies CP. Mild GWEN.   
: She remains in AF today. Rate stable in the 80s. No CP or worsening edema. Continues to report MARTINEZ. Has pulm follow up  to discuss NPSG. Would like to move ahead with DCCV and EP follow up. 
: Here for overdue follow up after 10/2020 DCCV. Maiden great for 4-5 days, then noted recurrent shortness of breath. Overall breathing is more comfortable, but just not 100%. Very mild BLE edema is new in past 3-4 weeks. No CP or syncope. No flutters or palps. 10/20:  Says she feels much better since CV despite going back into AFib. SOB is better. Taking meds as prescribed. :  Saw pulmonary for SOB and was found to be in AFib; PFTs were normal.  Chronically dyspneic but worse in the last several months and she feels her heart beating \"crazy\" from time to time. No LH, dizzy, syncope, CP, orthopnea, PND, GWEN. Problem List: 1. Persistent AFib w/ RVR. C2V = 4.  ANA MARIA/DCCV  to SR w/ EF 50-55% and mod MR/TR. Reverted back to AFib. MPI  w/ EF 38% and no i/i.  10/20 w/ CV to SR. 
2.  DM 
3. HTN 
4. Hypothyroid 5.  R breast CA s/p mastectomy and XRT 6. Former smoker; 3ppd; Q '15 , 2 kids, no e/t/d, ret'd homemaker. Plan:  
Echo w/ nml LVEF, RVSP 110, mod-sev MS, mild AS, mod-sev MR/TR 1. CTS consult 2. Stay in house for CABG/MVR 3. Diurese 4. Add ASA 5. Add lovenox; hold eliquis 6. Stop flecainide 7. Continue metoprolol 8.  Continue HTN meds  
 
 [x]        High complexity decision making was performed Subjective: CHIEF COMPLAINT: SOB HISTORY OF PRESENT ILLNESS:    
Richard Iyer is a 76 y.o.  female who is admitted after cath for CABG/MVR and diuresis. We were asked to admit for work up and evaluation of the above problems. Past Medical History:  
Diagnosis Date [facility]) 2020  Abscess of chest wall 8/10/2017  Acquired hypothyroidism 2018  Acute asthmatic bronchitis 8/10/2017  Breast cancer ([facility] 2015 Rt mastectomy  Cancer ([facility] right breast  
 Cigarette nicotine dependence in remission 2017  Diabetes ([facility]  Dyspepsia and other specified disorders of function of stomach  Family history of colon cancer in mother 2018  GERD (gastroesophageal reflux disease)  GERD without esophagitis 2017  Hyperglycemia 8/10/2017  Hypertension  Impacted cerumen of right ear 8/10/2017  Mood swings 8/10/2017  Nausea & vomiting  Obesity 8/10/2017  Urticaria 8/10/2017  Vitamin D deficiency 2018 Past Surgical History:  
Procedure Laterality Date [address]  HX BREAST BIOPSY Right 2016 RIGHT BREAST DEBRIDEMENT OF MASTECTOMY SITE performed by Eliseo Dixon MD at [place] [address]  
 tonsillectomy  HX MASTECTOMY Right 12/3/2015 RIGHT BREAST SIMPLE MASTECTOMY WITH RIGHT SENTINEL NODE BIOPSY performed by Eliseo Dixon MD at [facility] AMBULATORY OR Allergies Allergen Reactions  Latex Rash  Antihistamine [Diphenhydramine Hcl] Unknown (comments)  Iodine Rash  Novocain [Procaine] Palpitations Meds:  See below Social History Tobacco Use  Smoking status: Former Smoker Packs/day: 1.00 Years: 30.00 Pack years: 30.00 Types: Cigarettes Quit date: 12/3/2015 Years since quittin.4  Smokeless tobacco: Never Used  Tobacco comment: vaped for one year after quitting smoking Substance Use Topics  Alcohol use: No  
  Alcohol/week: 0.0 standard drinks Family History Problem Relation Age of Onset  Cancer Father   
     lung  Osteoporosis Mother  Arthritis-osteo Sister  Cancer Paternal Aunt   
     breast cancer, late 60's-early 66's REVIEW OF SYSTEMS:   
 []         Unable to obtain  ROS due to --- 
 [x]         Total of 12 systems reviewed as follows: 
 
Constitutional: negative fever, negative chills, negative weight loss Eyes:   negative visual changes ENT:   negative sore throat, tongue or lip swelling Respiratory:  negative cough, negative dyspnea Cards:  negative for chest pain, palpitations, lower extremity edema GI:   negative for nausea, vomiting, diarrhea, and abdominal pain Genitourinary: negative for frequency, dysuria Integument:  negative for rash Hematologic:  negative for easy bruising and gum/nose bleeding Musculoskel: negative for myalgias,  back pain Neurological:  negative for headaches, dizziness, vertigo, weakness Behavl/Psych: negative for feelings of anxiety, depression Pertinent Positives include : 
 
Objective:  
  
Physical Exam: 
 
Last 24hrs VS reviewed since prior progress note. Most recent are: 
 
Visit Vitals /60 Pulse (!) 58 Temp 97.4 °F (36.3 °C) Resp 14 Ht 5' 1\" (1.549 m) Wt 102.1 kg (225 lb) SpO2 92% BMI 42.51 kg/m² No intake or output data in the 24 hours ending 05/28/21 1332 General Appearance: Well developed, well nourished, alert & oriented x 3,  
 no acute distress. Ears/Nose/Mouth/Throat: Pupils equal and round, Hearing grossly normal. 
Neck: Supple. JVP within normal limits. Carotids good upstrokes, with no bruit. Chest: Lungs clear to auscultation bilaterally. Cardiovascular: Regular rate and rhythm, S1S2 normal, no murmur, rubs, gallops. Abdomen: Soft, non-tender, bowel sounds are active. No organomegaly. Extremities: No edema bilaterally. Femoral pulses +2, Distal Pulses +1. Skin: Warm and dry. Neuro: CN II-XII grossly intact, Strength and sensation grossly intact. []         Post-cath site without hematoma, bruit, tenderness, or thrill. Distal pulses intact. Data:  
  
Prior to Admission medications Medication Sig Start Date End Date Taking? Authorizing Provider  
valsartan-hydroCHLOROthiazide (DIOVAN-HCT) 80-12.5 mg per tablet TAKE 1 TABLET BY MOUTH EVERY DAY 5/20/21  Yes Radha Trevino MD  
glimepiride (AMARYL) 2 mg tablet Take 1 Tablet by mouth every morning. 5/20/21  Yes Radha Trevino MD  
sertraline (ZOLOFT) 50 mg tablet TAKE 1 TABLET BY MOUTH EVERY DAY 5/20/21  Yes Radha Trevino MD  
metoprolol tartrate (LOPRESSOR) 25 mg tablet Take 0.5 Tabs by mouth two (2) times a day. 10/29/20  Yes Violette Valerio III,   
cholecalciferol, VITAMIN D3, (VITAMIN D3) 5,000 unit tab tablet Take  by mouth daily. Yes Provider, Historical  
Omeprazole delayed release (PRILOSEC D/R) 20 mg tablet Take 20 mg by mouth daily. Yes Provider, Historical  
metFORMIN (GLUCOPHAGE) 500 mg tablet TAKE 1 TAB BY MOUTH TWO (2) TIMES DAILY (WITH MEALS). 5/20/21   Radha Trevino MD  
levothyroxine (SYNTHROID) 112 mcg tablet TAKE 1 TABLET BY MOUTH EVERY DAY BEFORE BREAKFAST 5/20/21   Radha Trevino MD  
apixaban (Eliquis) 5 mg tablet Take 5 mg by mouth two (2) times a day. Other, MD Sabine  
letrozole [facility]) 2.5 mg tablet  7/23/16   Provider, Historical  
 
 
Recent Results (from the past 24 hour(s)) GLUCOSE, POC Collection Time: 05/28/21  8:41 AM  
Result Value Ref Range Glucose (POC) 151 (H) 65 - 117 mg/dL Performed by Dorys Rees POC ACTIVATED CLOTTING TIME Collection Time: 05/28/21 11:26 AM  
Result Value Ref Range Activated Clotting Time (POC) 450 (H) 79 - 138 SECS  
GLUCOSE, POC Collection Time: 05/28/21 11:55 AM  
Result Value Ref Range Glucose (POC) 136 (H) 65 - 117 mg/dL  Performed by Tita Rivera RN PROTHROMBIN TIME + INR Collection Time: 05/28/21 12:37 PM  
Result Value Ref Range INR 1.3 (H) 0.9 - 1.1 Prothrombin time 13.2 (H) 9.0 - 11.1 sec PTT Collection Time: 05/28/21 12:37 PM  
Result Value Ref Range aPTT 51.5 (H) 22.1 - 31.0 sec  
 aPTT, therapeutic range     58.0 - 77.0 SECS  
URINALYSIS W/ REFLEX CULTURE Collection Time: 05/28/21 12:42 PM  
 Specimen: Urine Result Value Ref Range Color YELLOW/STRAW Appearance CLEAR CLEAR Specific gravity 1.015 1.003 - 1.030    
 pH (UA) 5.0 5.0 - 8.0 Protein Negative NEG mg/dL Glucose Negative NEG mg/dL Ketone Negative NEG mg/dL Bilirubin Negative NEG Blood LARGE (A) NEG Urobilinogen 0.2 0.2 - 1.0 EU/dL Nitrites Positive (A) NEG Leukocyte Esterase TRACE (A) NEG    
 WBC PENDING /hpf  
 RBC PENDING /hpf Epithelial cells PENDING /lpf Bacteria PENDING /hpf  
 UA:UC IF INDICATED PENDING Song Simpson III, DO

## 2021-05-28 NOTE — PROGRESS NOTES
1145: Pt arrived from [address] [place] [address]. Sheath in R groin, HOB flat, no hematoma, CDI. 1415: Pt sheath removed by FAYE Woodson. CDI, no hematoma. 1530: Pt HOB elevated to 30 degrees. R groin CDI, no hematoma.

## 2021-05-28 NOTE — Clinical Note
TRANSFER - OUT REPORT:     Verbal report given to: Refugio Glass RN. Report consisted of patient's Situation, Background, Assessment and   Recommendations(SBAR). Opportunity for questions and clarification was provided. Patient transported with a Registered Nurse and [address] [place] [address] / [facility]. Patient transported to: IVCU.

## 2021-05-28 NOTE — PROGRESS NOTES
Brief Procedure Note Patient: Christy Sierra MRN: [id number]  SSN: [id number] YOB: 1955  Age: 76 y.o. Sex: female Date of Procedure: 5/28/2021 Pre-procedure Diagnosis: MS, PHTN Post-procedure Diagnosis: Severe MS, 1v CAD, elevated L/R heart pressures, severe PHTN, mildly reduced CO/CI Procedure: Ultrasound-guided vascular access, LHC, RHC, MV study, LVg Performed By: Kalina Ramirez III, DO Anesthesia: Moderate Sedation Estimated Blood Loss: Less than 10 mL Specimens:  None Findings: as above Complications: None Implants: None Recommendations: CTS consult Signed By: Kalina Ramirez III, DO May 28, 2021

## 2021-05-28 NOTE — PROGRESS NOTES
SHEATH PULL NOTE: 
 
Patient informed of procedure with questions answered with review. Sheath site prepped with Chloraprep swab. 6 fr sheath (arterial sheath) in right groin pulled by MIRA [place] . 7 fr sheath in right groin (venous sheath) removed 4 minutes after removing arterial 6fr sheath. Hand hold and quick clot, with manual compression to site. No bleeding, no hematoma, no pain at site. Hemostasis obtained with hand hold/manual compression at site. Patient tolerated well. No change in status. Handhold for 24 minutes. No change at site. Quick clot and tegaderm dressing applied to site. No bleeding, no hematoma, no pain/discomfort at site. Groin instructions provided with review. Continue to monitor procedure site and patient status. *Advised patient to keep head flat and extremity flat to decrease risk of bleeding. *Recommended that patient not drink for ONE HOUR post sheath pull completion. *Recommended that patient not eat for TWO HOURS post sheath pull completion. *Instructed patient on rationale for delay of PO products to decrease risk for aspiration and if additional treatment to procedure site is required. Patient verbalized understanding of instructions with review.

## 2021-05-28 NOTE — CONSULTS
CSS Consult Note Subjective:  
  
Champ Ferrer is a 76 y.o. female who was referred for cardiac evaluation of severe mitral stenosis, 1V coronary artery disease and severe pulmonary hypertension, referred by Dr. Ana Maria Villanueva. Pt's PMH includes recent COVID19 pneumonia, chronic hypoxic respiratory disease on home oxygen 4L, Atrial fib s/p DCCV x 2, hypothyroidism, R breast cancer s/p mastectomy 2015 & XRT, DM2, GERD, HTN, obesity, former tobacco use. Pt presented to [facility] today for elective R & L heart cath s/t chief complaint of shortness of breath. Pt reports her breathing acutely changed for the worse in last 3-4 weeks. She is unable to walk minimal distances without getting sigificantly SOB. She denies CP, syncope. She admits to dizziness if she pushes herself during activity, has had LE edema x few months although improved with recent diuretics, + palpitations related to A fib and 2 pillow orthopnea. She notes she had COVID-19 pneumonia in 2021 and while she was SOB for awhile, it had improved after few months to the point she could do all her own ADLs and get around house. She recently started using a walker d/t her SOB. She is former tobacco smoker (quit >5 years ago, 40 year history). She denies alcohol or drug use. Denies family history of heart disease. She is  and lives with her  (who recently had heart surgery about 2 months ago). Cardiac Testing R & L Cardiac catheterization 21:  Severe MS, 1v CAD, elevated L/R heart pressures, severe PHTN, mildly reduced CO/CI Hemodynamics: Ao: 123/54/83 LV: 123/25 
  
RA: 18 
RV: 75/20 PA: 80/20/40 PCWP: 29 TP 
PVR: 2.6 (TD); 3.0 (F) 
CO: 4.2 (TD); 3.7 (F) CI: 2.1 (TD); 1.8 (F) Ao Sat: 92% PA Sat: 62% 
  
MV STUDY:  Mean gradient 16mmHg. MVA 0.8. 
  
Cors:  
  
Dominance: [x]? Right  []? Left  []?  Mixed 
  
LM: Large caliber vessel without significant stenosis 
  
LAD: Large caliber vessel that wraps around the apex with a proximal 60% stenosis D1: Moderate caliber vessel without significant stenosis.   
  
LCX: Moderate caliber vessel without significant stenosis. OM1: Small caliber vessel without significant stenosis. OM2: Small caliber vessel without significant stenosis.  
  
RCA: Large caliber dominant vessel without significant stenosis. PDA: Small caliber vessel without significant stenosis. PLB: Small caliber vessel without significant stenosis.   
  
LV angiography:  
EF: 55% Wall motion: no WMA 
MR: mod-sev 
  
Indication for IFR:  Indeterminate stenosis of the LAD. 
  
Technique: 
  
Lesion #1:  LAD 
  
Anticoagulation:  Bivalirudin was used for anticoagulation. Guiding Catheter:  A 6Fr EBU 3.5 guiding catheter was used to engage the Left Main. Guidewire: A Pressure was used to cross the lesion without difficulty. 
  
The pressure wire was zeroed outside of the body. The pressure wire was then introduced into the guiding catheter and into the vessel. The pressure wire was then equalized and then advanced across the indeterminate stenosis. The final flow rate was 0.79 indicating a hemodynamically significant stenosis. 
  
Post-IFR Angiogram showed PETE 3 flow without dissection or perforation. The patient tolerated the procedure well without any hemodynamic instability. ANA MARIA/DCCV 9/15/2020:  
· LV: Estimated LVEF is 50 - 55%. Normal cavity size, wall thickness and systolic function (ejection fraction normal). · LA: Mildly dilated left atrium. No spontaneous echo contrast Left atrial appendage velocity is normal (greater than 40 cm/sec). · MV: Mitral valve thickening. Mitral valve leaflet calcification. Moderate mitral valve regurgitation is present. · TV: Moderate tricuspid valve regurgitation is present. Cardioversion Findings Cardioversion Consent: Written consent obtained. Risks and benefits: risks, benefits and alternatives were discussed Consent given by: patient Patient understanding: patient states understanding of procedure being performed Patient consent: patient's understanding of procedure matches consent Procedure consent: procedure consent matches procedure scheduled Relevant documents: relevant documents present and verified Test results: test results available and properly labeled Patient identity confirmed: verbally with patient and arm band Time out: Immediately prior to procedure a \"time out\" was called to verify the correct patient, procedure, equipment, support staff and site/side marked as required. Sedation: 
Patient sedated: yes Sedation type: moderate (conscious) sedation Sedatives: fentanyl and versed Sedation start date/time: 9/15/2020 12:48 PM 
Sedation end date/time: 9/15/2020 1:06 PM 
Vitals: Vital signs were monitored during sedation. Cardioversion basis: elective Pre-procedure rhythm: atrial fibrillation Patient position: patient was placed in a supine position Chest area: chest area exposed Electrodes: pads Location of electrodes: left lateral and right posterior. Number of attempts: 1 Attempt 1 mode: synchronous Attempt 1 shock (in Joules): 360 Attempt 1 outcome: conversion to normal sinus rhythm Post-procedure rhythm: normal sinus rhythm Complications: hypotension Patient tolerance: patient tolerated the procedure well with no immediate complications Echo Findings Left Ventricle Normal cavity size, wall thickness and systolic function (ejection fraction normal). The estimated EF is 50 - 55%. Left Atrium Mildly dilated left atrium. Left atrial appendage is normal. Left atrial appendage size is moderate. There is no thrombus within the left atrial appendage. There is no mass within the left atrial appendage. No spontaneous echo contrast Appendage velocity is normal (greater than 40 cm/sec). Right Ventricle Normal cavity size and global systolic function. Right Atrium Normal cavity size.   
Interatrial Septum Agitated saline contrast study was performed. No atrial septal defect present. No closure device present. Aortic Valve Normal valve structure and no stenosis. Trace aortic valve regurgitation. Mitral Valve No stenosis. Mitral valve thickening. Leaflet calcification. There is anterior leaflet calcification. There is posterior leaflet calcification. Moderate regurgitation. Tricuspid Valve Normal valve structure and no stenosis. Moderate regurgitation. Pulmonic Valve Pulmonic valve not well visualized. Aorta Normal aortic root, ascending aortic, and aortic arch. Pulmonary Artery Pulmonary arteries not assessed. IVC/Hepatic Veins Inferior vena cava not assessed. Pericardium Insignificant pericardial effusion or fat. Past Medical History:  
Diagnosis Date [facility]) September 9th 2020  Abscess of chest wall 8/10/2017  Acquired hypothyroidism 2/21/2018  Acute asthmatic bronchitis 8/10/2017  Breast cancer ([address] 2015 Rt mastectomy  Cancer ([facility] [address] right breast  
 Cigarette nicotine dependence in remission 8/22/2017  Diabetes ([facility] [address]  Dyspepsia and other specified disorders of function of stomach  Family history of colon cancer in mother 8/22/2018  GERD (gastroesophageal reflux disease)  GERD without esophagitis 8/22/2017  Hyperglycemia 8/10/2017  Hypertension  Impacted cerumen of right ear 8/10/2017  Mood swings 8/10/2017  Nausea & vomiting  Obesity 8/10/2017  Urticaria 8/10/2017  Vitamin D deficiency 8/22/2018 Past Surgical History:  
Procedure Laterality Date [address]  HX BREAST BIOPSY Right 1/19/2016 RIGHT BREAST DEBRIDEMENT OF MASTECTOMY SITE performed by Bruce Baldwin MD at [facility]  
 tonsillectomy  HX MASTECTOMY Right 12/3/2015 RIGHT BREAST SIMPLE MASTECTOMY WITH RIGHT SENTINEL NODE BIOPSY performed by Bruce Baldwin MD at [facility] AMBULATORY OR Social History Tobacco Use  
 Smoking status: Former Smoker Packs/day: 1.00 Years: 30.00 Pack years: 30.00 Types: Cigarettes Quit date: 12/3/2015 Years since quittin.4  Smokeless tobacco: Never Used  Tobacco comment: vaped for one year after quitting smoking Substance Use Topics  Alcohol use: No  
  Alcohol/week: 0.0 standard drinks Family History Problem Relation Age of Onset  Cancer Father   
     lung  Osteoporosis Mother  Arthritis-osteo Sister  Cancer Paternal Aunt   
     breast cancer, late 60's-early 66's Prior to Admission medications Medication Sig Start Date End Date Taking? Authorizing Provider  
valsartan-hydroCHLOROthiazide (DIOVAN-HCT) 80-12.5 mg per tablet TAKE 1 TABLET BY MOUTH EVERY DAY 21  Yes Santosh Trevino MD  
glimepiride (AMARYL) 2 mg tablet Take 1 Tablet by mouth every morning. 21  Yes Santosh Trevino MD  
sertraline (ZOLOFT) 50 mg tablet TAKE 1 TABLET BY MOUTH EVERY DAY 21  Yes Santosh Trevino MD  
metoprolol tartrate (LOPRESSOR) 25 mg tablet Take 0.5 Tabs by mouth two (2) times a day. 10/29/20  Yes Henrik Valerio III, DO  
flecainide (TAMBOCOR) 50 mg tablet Take 1 Tab by mouth NON-INV CARD ONCE. 9/15/20  Yes Henrik Valerio III, DO  
cholecalciferol, VITAMIN D3, (VITAMIN D3) 5,000 unit tab tablet Take  by mouth daily. Yes Provider, Historical  
Omeprazole delayed release (PRILOSEC D/R) 20 mg tablet Take 20 mg by mouth daily. Yes Provider, Historical  
metFORMIN (GLUCOPHAGE) 500 mg tablet TAKE 1 TAB BY MOUTH TWO (2) TIMES DAILY (WITH MEALS). 21   Santosh Trevino MD  
levothyroxine (SYNTHROID) 112 mcg tablet TAKE 1 TABLET BY MOUTH EVERY DAY BEFORE BREAKFAST 21   Santosh Trevino MD  
apixaban (Eliquis) 5 mg tablet Take 5 mg by mouth two (2) times a day. Other, MD Sabine  
letrozole [facility]) 2.5 mg tablet  16   Provider, Historical  
   
Allergies Allergen Reactions  Latex Rash  Antihistamine [Diphenhydramine Hcl] Unknown (comments)  Iodine Rash  Novocain [Procaine] Palpitations Review of Systems:  Pertinent positives in HPI Consititutional: Denies fever or chills. Eyes:  Denies use of glasses or vision problems(cataracts). ENT:  Denies hearing or swallowing difficulty. CV: Denies CP, claudication, HTN. Resp: Denies dyspnea, productive cough. : Denies dialysis or kidney problems. GI: Denies ulcers, esophageal strictures, liver problems. M/S: Denies joint or bone problems, or implanted artificial hardware. Skin: Denies varicose veins, edema. Neuro: Denies strokes, or TIAs. Psych: Denies anxiety or depression. Endocrine: Denies thyroid problems or diabetes. Heme/Lymphatic: Denies easy bruising or lymphedema. Objective:  
 
VS:  
Visit Vitals /64 (BP 1 Location: Left upper arm, BP Patient Position: At rest) Pulse 62 Temp 97.4 °F (36.3 °C) Resp 21 Ht 5' 1\" (1.549 m) Wt 225 lb (102.1 kg) SpO2 96% BMI 42.51 kg/m² Physical Exam:   
General appearance: alert, cooperative, no distress Head: normocephalic, without obvious abnormality; atraumatic Eyes: conjunctivae/corneas clear; EOM's intact. Nose: nares normal; no drainage. Neck: no carotid bruit and no JVD Lungs: clear to auscultation bilaterally Heart: regular rate and rhythm; no murmur Abdomen: soft, non-tender; bowel sounds normal 
Extremities: moves all extremities; no weakness. Skin: Skin color normal; No varicose veins or edema. Neurologic: Grossly normal   
 
Labs:  
Recent Results (from the past 24 hour(s)) DUPLEX CAROTID BILATERAL Collection Time: 05/28/21  2:04 PM  
Result Value Ref Range Right subclavian sys 121.4 cm/s RIGHT SUBCLAVIAN ARTERY D 0.00 cm/s Right cca dist sys 54.8 cm/s Right CCA dist sy 15.0 cm/s Right CCA prox sys 65.0 cm/s Right CCA prox sy 18.0 cm/s Right ICA dist sys 68.8 cm/s  Right ICA dist sy 18.9 cm/s Right ICA mid sys 47.5 cm/s Right ICA mid sy 15.8 cm/s Right ICA prox sys 36.9 cm/s Right ICA prox sy 11.2 cm/s Right eca sys 49.8 cm/s RIGHT EXTERNAL CAROTID ARTERY D 9.39 cm/s Right vertebral sys 75.9 cm/s RIGHT VERTEBRAL ARTERY D 16.52 cm/s Right ICA/CCA sys 1.3 Left subclavian sys 79.5 cm/s LEFT SUBCLAVIAN ARTERY D 0.00 cm/s Left CCA dist sys 48.2 cm/s Left CCA dist sy 20.0 cm/s Left CCA prox sys 71.0 cm/s Left CCA prox sy 21.3 cm/s Left ICA dist sys 73.2 cm/s Left ICA dist sy 24.3 cm/s Left ICA mid sys 61.6 cm/s Left ICA mid sy 19.3 cm/s Left ICA prox sys 54.9 cm/s Left ICA prox sy 20.0 cm/s Left ECA sys 41.2 cm/s LEFT EXTERNAL CAROTID ARTERY D 5.72 cm/s Left vertebral sys 41.3 cm/s LEFT VERTEBRAL ARTERY D 14.11 cm/s Left ICA/CCA sys 1.52 GLUCOSE, POC Collection Time: 05/28/21  4:12 PM  
Result Value Ref Range Glucose (POC) 129 (H) 65 - 117 mg/dL Performed by Shanel Gonsalez RN   
GLUCOSE, POC Collection Time: 05/28/21  8:47 PM  
Result Value Ref Range Glucose (POC) 166 (H) 65 - 117 mg/dL Performed by Maxime Ocasio HEMOGLOBIN A1C WITH EAG Collection Time: 05/29/21  3:34 AM  
Result Value Ref Range Hemoglobin A1c 5.7 (H) 4.0 - 5.6 % Est. average glucose 117 mg/dL CBC WITH AUTOMATED DIFF Collection Time: 05/29/21  3:34 AM  
Result Value Ref Range WBC 11.1 (H) 3.6 - 11.0 K/uL  
 RBC 3.91 3.80 - 5.20 M/uL  
 HGB 12.2 11.5 - 16.0 g/dL HCT 37.7 35.0 - 47.0 % MCV 96.4 80.0 - 99.0 FL  
 MCH 31.2 26.0 - 34.0 PG  
 MCHC 32.4 30.0 - 36.5 g/dL  
 RDW 13.5 11.5 - 14.5 % PLATELET 698 674 - 462 K/uL MPV 10.2 8.9 - 12.9 FL  
 NRBC 0.0 0  WBC ABSOLUTE NRBC 0.00 0.00 - 0.01 K/uL NEUTROPHILS 71 32 - 75 % LYMPHOCYTES 17 12 - 49 % MONOCYTES 9 5 - 13 % EOSINOPHILS 2 0 - 7 % BASOPHILS 0 0 - 1 % IMMATURE GRANULOCYTES 1 (H) 0.0 - 0.5 % ABS. NEUTROPHILS 7.9 1.8 - 8.0 K/UL  
 ABS. LYMPHOCYTES 1.9 0.8 - 3.5 K/UL  
 ABS. MONOCYTES 1.0 0.0 - 1.0 K/UL  
 ABS. EOSINOPHILS 0.2 0.0 - 0.4 K/UL  
 ABS. BASOPHILS 0.0 0.0 - 0.1 K/UL  
 ABS. IMM. GRANS. 0.1 (H) 0.00 - 0.04 K/UL  
 DF AUTOMATED METABOLIC PANEL, COMPREHENSIVE Collection Time: 05/29/21  3:34 AM  
Result Value Ref Range Sodium 139 136 - 145 mmol/L Potassium 3.4 (L) 3.5 - 5.1 mmol/L Chloride 105 97 - 108 mmol/L  
 CO2 29 21 - 32 mmol/L Anion gap 5 5 - 15 mmol/L Glucose 95 65 - 100 mg/dL BUN 12 6 - 20 MG/DL Creatinine 0.86 0.55 - 1.02 MG/DL  
 BUN/Creatinine ratio 14 12 - 20 GFR est AA >60 >60 ml/min/1.73m2 GFR est non-AA >60 >60 ml/min/1.73m2 Calcium 9.1 8.5 - 10.1 MG/DL Bilirubin, total 0.8 0.2 - 1.0 MG/DL  
 ALT (SGPT) 25 12 - 78 U/L  
 AST (SGOT) 26 15 - 37 U/L Alk. phosphatase 53 45 - 117 U/L Protein, total 7.7 6.4 - 8.2 g/dL Albumin 3.6 3.5 - 5.0 g/dL Globulin 4.1 (H) 2.0 - 4.0 g/dL A-G Ratio 0.9 (L) 1.1 - 2.2 GLUCOSE, POC Collection Time: 05/29/21  6:29 AM  
Result Value Ref Range Glucose (POC) 113 65 - 117 mg/dL Performed by Bernard Cabrera GLUCOSE, POC Collection Time: 05/29/21 11:35 AM  
Result Value Ref Range Glucose (POC) 130 (H) 65 - 117 mg/dL Performed by [employer]:  
PA and lateral:  No acute disease Carotid doppler:  No significant disease PFTS-FEV1: n/a 
 
EKG: not completed Assessment:  
 
Active Problems: 
  Nonrheumatic mitral valve stenosis (5/28/2021) Coronary artery disease involving native coronary artery of native heart with angina pectoris ([facility] [address] (5/28/2021) Plan: STS Risk Calculator V2.81 - Discussed by surgeon with patient. Treatment Plan: 1. Severe mitral stenosis/Mod to severe MR: Surgical plans per team.  Will follow along. Discussed w/ Dr. Annette Castillo. On home 02 4L. 2. 1v CAD:  Surgical plans per team.  
3. Severe pulm HTN: 80/40s.  Cont diuretics. 4. Chronic A fib (hx of two DCCV): on eliquis, flecinide, BB PTA. 5. DM2: on amaryl. Hold metformin post cath. BS ACHS, SSI Per orders. 6. Hypothyroid: on synthroid 7. Former tobacco smoker: Seen by Pulmonary in 2020, PFTs did not reveal COPD. 8. Breast cancer s/p R mastectomy and XRT: on femura 9. GERD:  On prilosec PTA. Signed By: Easton Geiger NP May 28, 2021 Saw patient. History and films reviewed. Risks and benefits explained. Agrees with surgery. Patient seen by PA/NP and agree with above. Findings/Conditions: CAD/MR/MS Plan of Care: CABG / MVR Risk of morbidity and mortality - high Medical decision making - high complexity

## 2021-05-28 NOTE — Clinical Note
Sheath #2: Sheath: left in place. Site secured by Tegaderm and suture. Sheath connected to heparin pressure bag.

## 2021-05-28 NOTE — PROGRESS NOTES
Cardiac Cath Lab Recovery Arrival Note: 
 
 
Jacobo Nguyen arrived to Cardiac Cath Lab, Recovery Area. Staff introduced to patient. Patient identifiers verified with NAME and DATE OF BIRTH. Procedure verified with patient. Consent forms reviewed and signed by patient or authorized representative and verified. Allergies verified. Patient and family oriented to department. Patient and family informed of procedure and plan of care. Questions answered with review. Patient prepped for procedure, per orders from physician, prior to arrival. 
 
Patient on cardiac monitor, non-invasive blood pressure, SPO2 monitor. On room air. Patient is A&Ox 3. Patient reports no c/o. Patient in stretcher, in low position, with side rails up, call bell within reach, patient instructed to call if assistance as needed. Patient prep in: [address], [place]. Patient family has pager # none Family in: CCL waiting area.   
Prep by: Valdemar Vargas RN

## 2021-05-29 NOTE — PROGRESS NOTES
Bedside shift change report given to Virgie Rodríguez RN (oncoming nurse) by Roberto Jerry RN (offgoing nurse). Report included the following information SBAR, Kardex, Procedure Summary, Intake/Output, MAR, Accordion, Recent Results and Cardiac Rhythm NSR.

## 2021-05-29 NOTE — PROGRESS NOTES
Progress Note 5/29/2021 11:05 AM 
NAME: Roni Corrigan MRN:  [id number] Admit Diagnosis: Chest pain, unspecified type [R07.9] Mitral stenosis [I05.0] Assessment:   
  
1 Severe Mitral stenosis 2 CAD involving LAD  
LHC. RHC: Severe MS, 1v CAD, elevated L/R heart pressures, severe PHTN, mildly reduced CO/CI 3. Persistent AFib w/ RVR. C2V = 4.  ANA MARIA/DCCV 9/20 to SR w/ EF 50-55% and mod MR/TR. Reverted back to AFib. MPI 9/20 w/ EF 38% and no i/i.  10/20 w/ CV to SR. Echo w/ nml LVEF, RVSP 110, mod-sev MS, mild AS, mod-sev MR/TR 2.  DM 
3. HTN 
4. Hypothyroid 5.  R breast CA s/p mastectomy and XRT 6. Former smoker; 3ppd; Q '13 
  
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 
   
  
            Plan:    
 
  
1. CTS consult 2. Stay in house for CABG/MVR 3. Continue IV lasix 4. Add ASA 5. Add lovenox; hold eliquis 6. Stop flecainide 7. Continue metoprolol Changed losartan /HCTZ to lower dose of losartan Hypokalemia - replete electrolytes .  
  
 [x]? High complexity decision making was performed Subjective: HPI: 
No CP Has MARTINEZ Feels better with IV lasix  
diuresing well ROS:  
 
Objective:  
  
Physical Exam: 
 
Last 24hrs VS reviewed since prior progress note. Most recent are: 
 
Visit Vitals BP (!) 111/56 Pulse 70 Temp 98.4 °F (36.9 °C) Resp 18 Ht 5' 1\" (1.549 m) Wt 92.9 kg (204 lb 12.9 oz) SpO2 97% BMI 38.70 kg/m² Intake/Output Summary (Last 24 hours) at 5/29/2021 1105 Last data filed at 5/28/2021 1747 Gross per 24 hour Intake 200 ml Output 1800 ml Net -1600 ml General: Alert and oriented x3, no acute distress Neck: Supple Respiratory: No respiratory distress, clear lung sound Cardiovascular: Regular rate rhythm, S1S2, DM Abdomen: soft, non tender, non distended Neuro: moves all extremities, oriented x3 Skin: warm and dry Extremity: trace edema, warm to touch Cath access site unremarkable Data Review Telemetry: Afib Recent Labs  
  05/29/21 
0334 WBC 11.1* HGB 12.2 HCT 37.7  Recent Labs  
  05/28/21 
1237 INR 1.3* PTP 13.2* APTT 51.5* Recent Labs  
  05/29/21 
0334 05/28/21 
1237  136  
K 3.4* 3.7  106 CO2 29 25 BUN 12 11 CREA 0.86 0.66 GLU 95 129* CA 9.1 8.7 No results for input(s): CPK, CKNDX, TROIQ in the last 72 hours. No lab exists for component: CPKMB Lab Results Component Value Date/Time Cholesterol, total 129 05/24/2021 10:29 AM  
 HDL Cholesterol 41 05/24/2021 10:29 AM  
 LDL, calculated 68.4 05/24/2021 10:29 AM  
 Triglyceride 98 05/24/2021 10:29 AM  
 CHOL/HDL Ratio 3.1 05/24/2021 10:29 AM  
 
 
Recent Labs  
  05/29/21 
0334 05/28/21 
1237 AP 53 53 TP 7.7 7.1 ALB 3.6 3.3*  
GLOB 4.1* 3.8 No results for input(s): PH, PCO2, PO2 in the last 72 hours. Medications Personally Reviewed: 
 
Current Facility-Administered Medications Medication Dose Route Frequency  losartan (COZAAR) tablet 25 mg  25 mg Oral DAILY  sodium chloride (NS) flush 5-40 mL  5-40 mL IntraVENous Q8H  
 sodium chloride (NS) flush 5-40 mL  5-40 mL IntraVENous PRN  
 acetaminophen (TYLENOL) tablet 650 mg  650 mg Oral Q6H PRN Or  
 acetaminophen (TYLENOL) suppository 650 mg  650 mg Rectal Q6H PRN  polyethylene glycol (MIRALAX) packet 17 g  17 g Oral DAILY PRN  promethazine (PHENERGAN) tablet 12.5 mg  12.5 mg Oral Q6H PRN Or  
 ondansetron (ZOFRAN) injection 4 mg  4 mg IntraVENous Q6H PRN  
 glimepiride (AMARYL) tablet 2 mg  2 mg Oral DAILY  levothyroxine (SYNTHROID) tablet 112 mcg  112 mcg Oral ACB  metoprolol tartrate (LOPRESSOR) tablet 12.5 mg  12.5 mg Oral BID  pantoprazole (PROTONIX) tablet 40 mg  40 mg Oral ACB  sertraline (ZOLOFT) tablet 50 mg  50 mg Oral DAILY  glucose chewable tablet 16 g  4 Tablet Oral PRN  
 dextrose (D50W) injection syrg 12.5-25 g  25-50 mL IntraVENous PRN  
 glucagon (GLUCAGEN) injection 1 mg  1 mg IntraMUSCular PRN  
 insulin lispro (HUMALOG) injection   SubCUTAneous AC&HS  furosemide (LASIX) injection 40 mg  40 mg IntraVENous DAILY  enoxaparin (LOVENOX) injection 100 mg  1 mg/kg SubCUTAneous Q12H  aspirin chewable tablet 81 mg  81 mg Oral DAILY Kenji Cooper MD

## 2021-05-29 NOTE — PROGRESS NOTES
[facility] FLOOR Progress Note Admit Date: 2021 POD: 1 Day Post-Op Procedure:  Procedure(s): LEFT AND RIGHT HEART CATH / CORONARY ANGIOGRAPHY Ultrasound Guided Vascular Access Fractional Flow [employer] Left Ventriculography Subjective:  
 
Mild dyspnea and fatigue; denies chest pain; likely surgery next week Objective:  
 
Blood pressure (!) 116/59, pulse 62, temperature 98 °F (36.7 °C), resp. rate 18, height 5' 1\" (1.549 m), weight 204 lb 12.9 oz (92.9 kg), SpO2 96 %. Temp (24hrs), Av.1 °F (36.7 °C), Min:97.9 °F (36.6 °C), Max:98.5 °F (36.9 °C) Oxygen: CXR Medications reviewed Admission Weight: Last Weight Weight: 225 lb (102.1 kg) Weight: 204 lb 12.9 oz (92.9 kg) Intake / Output / Drain: 
Current Shift: No intake/output data recorded. Last 24 hrs.:  1901 -  0700 In: 200 [P.O.:200] Out: 1800 [Urine:1800] EXAM: 
Visit Vitals BP (!) 116/59 Pulse 62 Temp 98 °F (36.7 °C) Resp 18 Ht 5' 1\" (1.549 m) Wt 204 lb 12.9 oz (92.9 kg) SpO2 96% BMI 38.70 kg/m² Labs: 
Recent Results (from the past 24 hour(s)) GLUCOSE, POC Collection Time: 21  8:47 PM  
Result Value Ref Range Glucose (POC) 166 (H) 65 - 117 mg/dL Performed by Benjamin Andino HEMOGLOBIN A1C WITH EAG Collection Time: 21  3:34 AM  
Result Value Ref Range Hemoglobin A1c 5.7 (H) 4.0 - 5.6 % Est. average glucose 117 mg/dL CBC WITH AUTOMATED DIFF Collection Time: 21  3:34 AM  
Result Value Ref Range WBC 11.1 (H) 3.6 - 11.0 K/uL  
 RBC 3.91 3.80 - 5.20 M/uL  
 HGB 12.2 11.5 - 16.0 g/dL HCT 37.7 35.0 - 47.0 % MCV 96.4 80.0 - 99.0 FL  
 MCH 31.2 26.0 - 34.0 PG  
 MCHC 32.4 30.0 - 36.5 g/dL  
 RDW 13.5 11.5 - 14.5 % PLATELET 956 950 - 943 K/uL MPV 10.2 8.9 - 12.9 FL  
 NRBC 0.0 0  WBC ABSOLUTE NRBC 0.00 0.00 - 0.01 K/uL NEUTROPHILS 71 32 - 75 % LYMPHOCYTES 17 12 - 49 % MONOCYTES 9 5 - 13 % EOSINOPHILS 2 0 - 7 % BASOPHILS 0 0 - 1 % IMMATURE GRANULOCYTES 1 (H) 0.0 - 0.5 % ABS. NEUTROPHILS 7.9 1.8 - 8.0 K/UL  
 ABS. LYMPHOCYTES 1.9 0.8 - 3.5 K/UL  
 ABS. MONOCYTES 1.0 0.0 - 1.0 K/UL  
 ABS. EOSINOPHILS 0.2 0.0 - 0.4 K/UL  
 ABS. BASOPHILS 0.0 0.0 - 0.1 K/UL  
 ABS. IMM. GRANS. 0.1 (H) 0.00 - 0.04 K/UL  
 DF AUTOMATED METABOLIC PANEL, COMPREHENSIVE Collection Time: 05/29/21  3:34 AM  
Result Value Ref Range Sodium 139 136 - 145 mmol/L Potassium 3.4 (L) 3.5 - 5.1 mmol/L Chloride 105 97 - 108 mmol/L  
 CO2 29 21 - 32 mmol/L Anion gap 5 5 - 15 mmol/L Glucose 95 65 - 100 mg/dL BUN 12 6 - 20 MG/DL Creatinine 0.86 0.55 - 1.02 MG/DL  
 BUN/Creatinine ratio 14 12 - 20 GFR est AA >60 >60 ml/min/1.73m2 GFR est non-AA >60 >60 ml/min/1.73m2 Calcium 9.1 8.5 - 10.1 MG/DL Bilirubin, total 0.8 0.2 - 1.0 MG/DL  
 ALT (SGPT) 25 12 - 78 U/L  
 AST (SGOT) 26 15 - 37 U/L Alk. phosphatase 53 45 - 117 U/L Protein, total 7.7 6.4 - 8.2 g/dL Albumin 3.6 3.5 - 5.0 g/dL Globulin 4.1 (H) 2.0 - 4.0 g/dL A-G Ratio 0.9 (L) 1.1 - 2.2 GLUCOSE, POC Collection Time: 05/29/21  6:29 AM  
Result Value Ref Range Glucose (POC) 113 65 - 117 mg/dL Performed by Ambar Smith GLUCOSE, POC Collection Time: 05/29/21 11:35 AM  
Result Value Ref Range Glucose (POC) 130 (H) 65 - 117 mg/dL Performed by Kathryn Wild GLUCOSE, POC Collection Time: 05/29/21  3:52 PM  
Result Value Ref Range Glucose (POC) 83 65 - 117 mg/dL Performed by Kathryn Wild A/P 
 
1. Severe mitral stenosis/Mod to severe MR: Surgical plans per team.  Will follow along. Discussed w/ Dr. Tristan Ruvalcaba. On home 02 4L. 2. 1v CAD:  Surgical plans per team.  
3. Severe pulm HTN: 80/40s. Cont diuretics. 4. Chronic A fib (hx of two DCCV): on eliquis, flecinide, BB PTA. 5. DM2: on amaryl. Hold metformin post cath. BS ACHS, SSI Per orders. 6. Hypothyroid: on synthroid 7.  Former tobacco smoker: Seen by Pulmonary in 2020, PFTs did not reveal COPD. 8. Breast cancer s/p R mastectomy and XRT: on femura 9.  GERD:  On prilosec PTA. 
  
Signed By: Sarahi Fox MD

## 2021-05-29 NOTE — PROGRESS NOTES
Saw patient. History and films reviewed. Risks and benefits explained. Agrees with surgery. Patient seen by PA/NP and agree with above. Findings/Conditions: CAD/MR/MS Plan of Care: CABG / MVR See H and P for detailed assessment Risk of morbidity and mortality - high Medical decision making - high complexity

## 2021-05-30 NOTE — PROGRESS NOTES
Progress Note 5/30/2021 11:05 AM 
NAME: Shani Pizano MRN:  [id number] Admit Diagnosis: Chest pain, unspecified type [R07.9] Mitral stenosis [I05.0] Assessment:   
  
1 Severe Mitral stenosis 2 CAD involving LAD  
LHC. RHC: Severe MS, 1v CAD, elevated L/R heart pressures, severe PHTN, mildly reduced CO/CI 3. Persistent AFib w/ RVR. C2V = 4.  ANA MARIA/DCCV 9/20 to SR w/ EF 50-55% and mod MR/TR. Reverted back to AFib. MPI 9/20 w/ EF 38% and no i/i.  10/20 w/ CV to SR. Echo w/ nml LVEF, RVSP 110, mod-sev MS, mild AS, mod-sev MR/TR 2.  DM 
3. HTN 
4. Hypothyroid 5.  R breast CA s/p mastectomy and XRT 6. Former smoker; 3ppd; Q '13 
  
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 
   
  
            Plan:    
 
  
1. CTS consult 2. Stay in house for CABG/MVR 3. Continue IV lasix 4. Add ASA 5. Add lovenox; hold eliquis 6. Stop flecainide 7. Continue metoprolol 8. BP lower side, will hold Losartan 9. Hypokalemia - replete electrolytes .  
  
 [x]? High complexity decision making was performed Subjective: HPI: 
No CP Has MARTINEZ - improved Feels better with IV lasix - pedal edema is decreasing ROS: no abd pain / n/v, syncope Objective:  
  
Physical Exam: 
 
Last 24hrs VS reviewed since prior progress note. Most recent are: 
 
Visit Vitals BP (!) 100/45 (BP 1 Location: Left upper arm, BP Patient Position: At rest) Pulse 65 Temp 98.1 °F (36.7 °C) Resp 18 Ht 5' 1\" (1.549 m) Wt 92.1 kg (203 lb 0.7 oz) SpO2 96% BMI 38.36 kg/m² No intake or output data in the 24 hours ending 05/30/21 0820 General: Alert and oriented x3, no acute distress Neck: Supple Respiratory: No respiratory distress, clear lung sound Cardiovascular: Regular rate rhythm, S1S2, DM Abdomen: soft, non tender, non distended Neuro: moves all extremities, oriented x3 Skin: warm and dry Extremity: trace edema, warm to touch Cath access site unremarkable Data Review Telemetry: Afib Recent Labs 05/30/21 
4480 05/29/21 
2851 WBC 9.4 11.1* HGB 11.2* 12.2 HCT 35.8 37.7  232 Recent Labs  
  05/28/21 
1237 INR 1.3* PTP 13.2* APTT 51.5* Recent Labs 05/30/21 
4534 05/29/21 
7880 05/28/21 
1237  139 136  
K 3.2* 3.4* 3.7  105 106 CO2 28 29 25 BUN 15 12 11 CREA 0.67 0.86 0.66 * 95 129* CA 9.2 9.1 8.7 MG 2.1  --   -- No results for input(s): CPK, CKNDX, TROIQ in the last 72 hours. No lab exists for component: CPKMB Lab Results Component Value Date/Time Cholesterol, total 129 05/24/2021 10:29 AM  
 HDL Cholesterol 41 05/24/2021 10:29 AM  
 LDL, calculated 68.4 05/24/2021 10:29 AM  
 Triglyceride 98 05/24/2021 10:29 AM  
 CHOL/HDL Ratio 3.1 05/24/2021 10:29 AM  
 
 
Recent Labs 05/30/21 
1326 05/29/21 
2984 05/28/21 
1237 AP 53 53 53 TP 7.1 7.7 7.1 ALB 3.3* 3.6 3.3*  
GLOB 3.8 4.1* 3.8 No results for input(s): PH, PCO2, PO2 in the last 72 hours. Medications Personally Reviewed: 
 
Current Facility-Administered Medications Medication Dose Route Frequency  losartan (COZAAR) tablet 25 mg  25 mg Oral DAILY  sodium chloride (NS) flush 5-40 mL  5-40 mL IntraVENous Q8H  
 sodium chloride (NS) flush 5-40 mL  5-40 mL IntraVENous PRN  
 acetaminophen (TYLENOL) tablet 650 mg  650 mg Oral Q6H PRN Or  
 acetaminophen (TYLENOL) suppository 650 mg  650 mg Rectal Q6H PRN  polyethylene glycol (MIRALAX) packet 17 g  17 g Oral DAILY PRN  promethazine (PHENERGAN) tablet 12.5 mg  12.5 mg Oral Q6H PRN Or  
 ondansetron (ZOFRAN) injection 4 mg  4 mg IntraVENous Q6H PRN  
 glimepiride (AMARYL) tablet 2 mg  2 mg Oral DAILY  levothyroxine (SYNTHROID) tablet 112 mcg  112 mcg Oral ACB  metoprolol tartrate (LOPRESSOR) tablet 12.5 mg  12.5 mg Oral BID  pantoprazole (PROTONIX) tablet 40 mg  40 mg Oral ACB  sertraline (ZOLOFT) tablet 50 mg  50 mg Oral DAILY  glucose chewable tablet 16 g  4 Tablet Oral PRN  
 dextrose (D50W) injection syrg 12.5-25 g  25-50 mL IntraVENous PRN  
 glucagon (GLUCAGEN) injection 1 mg  1 mg IntraMUSCular PRN  
 insulin lispro (HUMALOG) injection   SubCUTAneous AC&HS  furosemide (LASIX) injection 40 mg  40 mg IntraVENous DAILY  enoxaparin (LOVENOX) injection 100 mg  1 mg/kg SubCUTAneous Q12H  aspirin chewable tablet 81 mg  81 mg Oral DAILY Damian Lala MD

## 2021-05-30 NOTE — PROGRESS NOTES
Problem: Falls - Risk of 
Goal: *Absence of Falls Description: Document Dinesh Boone Fall Risk and appropriate interventions in the flowsheet. Outcome: Progressing Towards Goal 
Note: Fall Risk Interventions: 
  
 
  
 
Medication Interventions: Teach patient to arise slowly

## 2021-05-30 NOTE — PROGRESS NOTES
Reason for Admission: SOB  
               
RUR Score: 16% Plan for utilizing home health: Unknown at this time, the patient may need [facility] health facility services after her surgery. She would like to use [facility] as her  recently had services with [facility] and they were very pleased with the care he received. PCP: First and Last name:  Cristi Valdez MD 
 Name of Practice: [facility] Are you a current patient: Yes/No: Yes Approximate date of last visit: 5/20/21 Can you participate in a virtual visit with your PCP: Yes Current Advanced Directive/Advance Care Plan: Full Code Healthcare Decision Maker: Tegan Bogoie, , Phone: [phone] Click here to complete [place] including selection of the Healthcare Decision Maker Relationship (ie \"Primary\") Transition of Care Plan:          
CM met with the patient and her  at bedside to discuss dispo plan. The patient lives in a split-level home with 7 steps to enter with her . She has 2 sons that both live in [place], [place]. She has 2 grandsons and 2 granddaughters. She has 2 sisters that also live locally that are supportive. The patient's family is very supportive. The patient is independent in her ADLs and IADLs. She uses a rollator when ambulating. She has a shower chair that she uses as well. She is on 4L of O2 at home and her O2 provider is [facility]. She is able to drive and her  will assist with d/c transportation. She is retired and she uses the [address] on [address] in [place], [place] for her medications. She has never been to an IPR/SNF in the past. The patient's  has used [facility]  in the past and if [facility] mental health facility is recommended for her then she would like to use [facility] . CM will continue to assist with dispo planning. Care Management Interventions PCP Verified by CM: Yes Last Visit to PCP: 05/20/21 Mode of Transport at Discharge: Other (see comment) (The patient's  will assist with d/c transportation) Transition of Care Consult (CM Consult): Discharge Planning MyChart Signup: No 
Discharge Durable Medical Equipment: No 
Physical Therapy Consult: No 
Occupational Therapy Consult: No 
Speech Therapy Consult: No 
Current Support Network: Lives with Spouse Confirm Follow Up Transport: Family The Patient and/or Patient Representative was Provided with a Choice of Provider and Agrees with the Discharge Plan?: Yes Name of the Patient Representative Who was Provided with a Choice of Provider and Agrees with the Discharge Plan: Kyle Ayala [employer] Information Provided?: No 
 
Hannah Lo LCSW

## 2021-05-30 NOTE — PROGRESS NOTES
Problem: Falls - Risk of 
Goal: *Absence of Falls Description: Document Dmitry Espitia Fall Risk and appropriate interventions in the flowsheet. Outcome: Progressing Towards Goal 
Note: Fall Risk Interventions: 
  
 
  
 
Medication Interventions: Teach patient to arise slowly Problem: Patient Education: Go to Patient Education Activity Goal: Patient/Family Education Outcome: Progressing Towards Goal

## 2021-05-31 NOTE — PROGRESS NOTES
[facility] FLOOR Progress Note Admit Date: 2021 POD: 3 Days Post-Op Procedure:  Procedure(s): LEFT AND RIGHT HEART CATH / CORONARY ANGIOGRAPHY Ultrasound Guided Vascular Access Fractional Flow [employer] Left Ventriculography Subjective:  
 
Feels fine this am; surgery likely next week Objective:  
 
Blood pressure (!) 93/52, pulse 65, temperature 98.1 °F (36.7 °C), resp. rate 18, height 5' 1\" (1.549 m), weight 203 lb 14.8 oz (92.5 kg), SpO2 98 %. Temp (24hrs), Av °F (36.7 °C), Min:97.9 °F (36.6 °C), Max:98.1 °F (36.7 °C) Oxygen: CXR Medications reviewed Admission Weight: Last Weight Weight: 225 lb (102.1 kg) Weight: 203 lb 14.8 oz (92.5 kg) Intake / Output / Drain: 
Current Shift: No intake/output data recorded. Last 24 hrs.:  190 -  0700 In: 400 [P.O.:400] Out: - EXAM: 
Visit Vitals BP (!) 93/52 Pulse 65 Temp 98.1 °F (36.7 °C) Resp 18 Ht 5' 1\" (1.549 m) Wt 203 lb 14.8 oz (92.5 kg) SpO2 98% BMI 38.53 kg/m² Labs: 
Recent Results (from the past 24 hour(s)) GLUCOSE, POC Collection Time: 21 11:16 AM  
Result Value Ref Range Glucose (POC) 122 (H) 65 - 117 mg/dL Performed by Jose Stubbs RN   
GLUCOSE, POC Collection Time: 21  3:52 PM  
Result Value Ref Range Glucose (POC) 96 65 - 117 mg/dL Performed by Jose Stubbs RN   
GLUCOSE, POC Collection Time: 21  9:59 PM  
Result Value Ref Range Glucose (POC) 112 65 - 117 mg/dL Performed by Horacio Brentwood METABOLIC PANEL, COMPREHENSIVE Collection Time: 21  3:40 AM  
Result Value Ref Range Sodium 136 136 - 145 mmol/L Potassium 4.2 3.5 - 5.1 mmol/L Chloride 104 97 - 108 mmol/L  
 CO2 28 21 - 32 mmol/L Anion gap 4 (L) 5 - 15 mmol/L Glucose 109 (H) 65 - 100 mg/dL BUN 18 6 - 20 MG/DL Creatinine 0.75 0.55 - 1.02 MG/DL  
 BUN/Creatinine ratio 24 (H) 12 - 20 GFR est AA >60 >60 ml/min/1.73m2  GFR est non-AA >60 >60 ml/min/1.73m2 Calcium 9.5 8.5 - 10.1 MG/DL Bilirubin, total 0.6 0.2 - 1.0 MG/DL  
 ALT (SGPT) 22 12 - 78 U/L  
 AST (SGOT) 19 15 - 37 U/L Alk. phosphatase 54 45 - 117 U/L Protein, total 7.4 6.4 - 8.2 g/dL Albumin 3.4 (L) 3.5 - 5.0 g/dL Globulin 4.0 2.0 - 4.0 g/dL A-G Ratio 0.9 (L) 1.1 - 2.2 GLUCOSE, POC Collection Time: 05/31/21  6:49 AM  
Result Value Ref Range Glucose (POC) 129 (H) 65 - 117 mg/dL Performed by Horacio Polanco   
 
 
  
A/P 
  
1. Severe mitral stenosis/Mod to severe MR: Surgical plans per team.  Will follow along. [facility] 2. 1v CAD:  Surgical plans per team.  
3. Severe pulm HTN: 80/40s. Cont diuretics.   
4. Chronic A fib (hx of two DCCV): on eliquis, flecinide, BB PTA. 5. DM2: on amaryl. Hold metformin post cath.  BS ACHS, SSI Per orders. 6. Hypothyroid: on synthroid 7. Former tobacco smoker: Seen by Pulmonary in 2020, PFTs did not reveal COPD. 8. Breast cancer s/p R mastectomy and XRT: on femura 9.  GERD:  On prilosec PTA. 
  
 
Signed By: Jessica Rubio MD

## 2021-05-31 NOTE — PROGRESS NOTES
[facility] End of Shift Note Tele: 
Significant tele event? none Time/details of tele event: N/A 
 
I&O/ Daily Weight: 
Strict I&O ordered? YES Fluid restriction ordered: NO 
PO intake since midnight: N/A Daily weight ordered: YES Today's weight: N/A    Yesterday's weight: N/A Significant weight gain reported to MD? N/A Procedure: NPO order for upcoming test/procedure present? N/A Procedure/test: N/A Date of procedure: N/A Discharge: 
Times ambulated in hallways past shift: 0        Times OOB to chair past shift: 3 Plan/Discharge barriers identified?:  
Has this patient received a stent this admission? NO If yes, verify patient has aspirin, antiplatelet, statin, BB, ACE/ARB (for EF < 40%) ordered. N/A If not ordered, which medication is missing? N/A N/A Other concerns: 
Any nurse/patient concerns to be addressed by MD? N/A Bedside and Verbal shift change report given to salud (oncoming nurse) by Yousif Willams RN (offgoing nurse). Report included the following information SBAR, Kardex, ED Summary, Procedure Summary, Intake/Output and MAR.   
 
Yousif Willams RN

## 2021-05-31 NOTE — PROGRESS NOTES
Problem: Falls - Risk of 
Goal: *Absence of Falls Description: Document Guillermo Joseph Fall Risk and appropriate interventions in the flowsheet. 5/31/2021 0401 by Paul Ontiveros RN Outcome: Progressing Towards Goal 
Note: Fall Risk Interventions: 
  
 
  
 
Medication Interventions: Teach patient to arise slowly 5/30/2021 2013 by Paul Ontiveros RN Outcome: Progressing Towards Goal 
Note: Fall Risk Interventions: 
  
 
  
 
Medication Interventions: Teach patient to arise slowly 5/30/2021 1950 by Paul Ontiveros RN Outcome: Progressing Towards Goal 
Note: Fall Risk Interventions: 
  
 
  
 
Medication Interventions: Teach patient to arise slowly

## 2021-05-31 NOTE — PROGRESS NOTES
[facility] FLOOR Progress Note Admit Date: 2021 POD: 3 Days Post-Op Procedure:  Procedure(s): LEFT AND RIGHT HEART CATH / CORONARY ANGIOGRAPHY Ultrasound Guided Vascular Access Fractional Flow [employer] Left Ventriculography Subjective: No events overnight; surgery likely next week Objective:  
 
Blood pressure (!) 93/52, pulse 65, temperature 98.1 °F (36.7 °C), resp. rate 18, height 5' 1\" (1.549 m), weight 203 lb 14.8 oz (92.5 kg), SpO2 98 %. Temp (24hrs), Av °F (36.7 °C), Min:97.9 °F (36.6 °C), Max:98.1 °F (36.7 °C) Oxygen: CXR Medications reviewed Admission Weight: Last Weight Weight: 225 lb (102.1 kg) Weight: 203 lb 14.8 oz (92.5 kg) Intake / Output / Drain: 
Current Shift: No intake/output data recorded. Last 24 hrs.:  1901 -  0700 In: 400 [P.O.:400] Out: - EXAM: 
Visit Vitals BP (!) 93/52 Pulse 65 Temp 98.1 °F (36.7 °C) Resp 18 Ht 5' 1\" (1.549 m) Wt 203 lb 14.8 oz (92.5 kg) SpO2 98% BMI 38.53 kg/m² Labs: 
Recent Results (from the past 24 hour(s)) GLUCOSE, POC Collection Time: 21 11:16 AM  
Result Value Ref Range Glucose (POC) 122 (H) 65 - 117 mg/dL Performed by Pete eBst RN   
GLUCOSE, POC Collection Time: 21  3:52 PM  
Result Value Ref Range Glucose (POC) 96 65 - 117 mg/dL Performed by Pete Best RN   
GLUCOSE, POC Collection Time: 21  9:59 PM  
Result Value Ref Range Glucose (POC) 112 65 - 117 mg/dL Performed by Darrell Falk METABOLIC PANEL, COMPREHENSIVE Collection Time: 21  3:40 AM  
Result Value Ref Range Sodium 136 136 - 145 mmol/L Potassium 4.2 3.5 - 5.1 mmol/L Chloride 104 97 - 108 mmol/L  
 CO2 28 21 - 32 mmol/L Anion gap 4 (L) 5 - 15 mmol/L Glucose 109 (H) 65 - 100 mg/dL BUN 18 6 - 20 MG/DL Creatinine 0.75 0.55 - 1.02 MG/DL  
 BUN/Creatinine ratio 24 (H) 12 - 20 GFR est AA >60 >60 ml/min/1.73m2  GFR est non-AA >60 >60 ml/min/1.73m2 Calcium 9.5 8.5 - 10.1 MG/DL Bilirubin, total 0.6 0.2 - 1.0 MG/DL  
 ALT (SGPT) 22 12 - 78 U/L  
 AST (SGOT) 19 15 - 37 U/L Alk. phosphatase 54 45 - 117 U/L Protein, total 7.4 6.4 - 8.2 g/dL Albumin 3.4 (L) 3.5 - 5.0 g/dL Globulin 4.0 2.0 - 4.0 g/dL A-G Ratio 0.9 (L) 1.1 - 2.2 GLUCOSE, POC Collection Time: 05/31/21  6:49 AM  
Result Value Ref Range Glucose (POC) 129 (H) 65 - 117 mg/dL Performed by Sage Haddad   
 
 
  
A/P 
  
1. Severe mitral stenosis/Mod to severe MR: Surgical plans per team.  Will follow along. Discussed w/ Dr. Angel Early home 02 4L. 2. 1v CAD:  Surgical plans per team.  
3. Severe pulm HTN: 80/40s. Cont diuretics.   
4. Chronic A fib (hx of two DCCV): on eliquis, flecinide, BB PTA. 5. DM2: on amaryl. Hold metformin post cath.  BS ACHS, SSI Per orders. 6. Hypothyroid: on synthroid 7. Former tobacco smoker: Seen by Pulmonary in 2020, PFTs did not reveal COPD. 8. Breast cancer s/p R mastectomy and XRT: on femura 9.  GERD:  On prilosec PTA. 
  
Signed By: Roque Guzmán MD

## 2021-05-31 NOTE — PROGRESS NOTES
Problem: Falls - Risk of 
Goal: *Absence of Falls Description: Document Matt Quintero Fall Risk and appropriate interventions in the flowsheet. 5/30/2021 2013 by Shannan Cuellar RN Outcome: Progressing Towards Goal 
Note: Fall Risk Interventions: 
  
 
  
 
Medication Interventions: Teach patient to arise slowly 5/30/2021 1950 by Shannan Cuellar RN Outcome: Progressing Towards Goal 
Note: Fall Risk Interventions: 
  
 
  
 
Medication Interventions: Teach patient to arise slowly

## 2021-05-31 NOTE — PROGRESS NOTES
Progress Note 5/31/2021 11:05 AM 
NAME: Seferino Hood MRN:  [id number] Admit Diagnosis: Chest pain, unspecified type [R07.9] Mitral stenosis [I05.0] Assessment:   
  
1 Severe Mitral stenosis 2 CAD involving LAD  
LHC. RHC: Severe MS, 1v CAD, elevated L/R heart pressures, severe PHTN, mildly reduced CO/CI 3. Persistent AFib w/ RVR. C2V = 4.  ANA MARIA/DCCV 9/20 to SR w/ EF 50-55% and mod MR/TR. Reverted back to AFib. MPI 9/20 w/ EF 38% and no i/i.  10/20 w/ CV to SR. Echo w/ nml LVEF, RVSP 110, mod-sev MS, mild AS, mod-sev MR/TR 2.  DM 
3. HTN 
4. Hypothyroid 5.  R breast CA s/p mastectomy and XRT 6. Former smoker; 3ppd; Q '13 
  
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 
   
  
            Plan:    
 
  
1. CTS consult 2. Stay in house for CABG/MVR 3. Continue IV lasix 4. Add ASA 5. Add lovenox; hold eliquis 6. Stop flecainide 7. Continue metoprolol 8. BP lower side, will hold Losartan 9. Hypokalemia - resolved, adjust KCL dose .  
  
 [x]? High complexity decision making was performed Subjective: HPI: 
No CP Pedal edema/ MARTINEZ improving ROS: no abd pain / n/v, syncope Objective:  
  
Physical Exam: 
 
Last 24hrs VS reviewed since prior progress note. Most recent are: 
 
Visit Vitals BP (!) 93/52 Pulse 65 Temp 98.1 °F (36.7 °C) Resp 18 Ht 5' 1\" (1.549 m) Wt 92.5 kg (203 lb 14.8 oz) SpO2 98% BMI 38.53 kg/m² Intake/Output Summary (Last 24 hours) at 5/31/2021 0805 Last data filed at 5/30/2021 1120 Gross per 24 hour Intake 200 ml Output  Net 200 ml General: Alert and oriented x3, no acute distress Neck: Supple Respiratory: No respiratory distress, clear lung sound Cardiovascular: Regular rate rhythm, S1S2, DM Abdomen: soft, non tender, non distended Neuro: moves all extremities, oriented x3 Skin: warm and dry Extremity: trace edema, warm to touch Cath access site unremarkable Data Review Telemetry: Afib Recent Labs 05/30/21 
6516 05/29/21 
2969 WBC 9.4 11.1* HGB 11.2* 12.2 HCT 35.8 37.7  232 Recent Labs  
  05/28/21 
1237 INR 1.3* PTP 13.2* APTT 51.5* Recent Labs 05/31/21 
4140 05/30/21 
7144 05/29/21 
5418  138 139  
K 4.2 3.2* 3.4*  
 103 105 CO2 28 28 29 BUN 18 15 12 CREA 0.75 0.67 0.86 * 103* 95  
CA 9.5 9.2 9.1 MG  --  2.1  -- No results for input(s): CPK, CKNDX, TROIQ in the last 72 hours. No lab exists for component: CPKMB Lab Results Component Value Date/Time Cholesterol, total 129 05/24/2021 10:29 AM  
 HDL Cholesterol 41 05/24/2021 10:29 AM  
 LDL, calculated 68.4 05/24/2021 10:29 AM  
 Triglyceride 98 05/24/2021 10:29 AM  
 CHOL/HDL Ratio 3.1 05/24/2021 10:29 AM  
 
 
Recent Labs 05/31/21 
7549 05/30/21 
1612 05/29/21 
7181 AP 54 53 53 TP 7.4 7.1 7.7 ALB 3.4* 3.3* 3.6 GLOB 4.0 3.8 4.1* No results for input(s): PH, PCO2, PO2 in the last 72 hours. Medications Personally Reviewed: 
 
Current Facility-Administered Medications Medication Dose Route Frequency  potassium chloride SR (KLOR-CON 10) tablet 40 mEq  40 mEq Oral BID  potassium chloride SR (KLOR-CON 10) tablet 40 mEq  40 mEq Oral DAILY  [Held by provider] losartan (COZAAR) tablet 25 mg  25 mg Oral DAILY  sodium chloride (NS) flush 5-40 mL  5-40 mL IntraVENous Q8H  
 sodium chloride (NS) flush 5-40 mL  5-40 mL IntraVENous PRN  
 acetaminophen (TYLENOL) tablet 650 mg  650 mg Oral Q6H PRN Or  
 acetaminophen (TYLENOL) suppository 650 mg  650 mg Rectal Q6H PRN  polyethylene glycol (MIRALAX) packet 17 g  17 g Oral DAILY PRN  promethazine (PHENERGAN) tablet 12.5 mg  12.5 mg Oral Q6H PRN Or  
 ondansetron (ZOFRAN) injection 4 mg  4 mg IntraVENous Q6H PRN  
 glimepiride (AMARYL) tablet 2 mg  2 mg Oral DAILY  levothyroxine (SYNTHROID) tablet 112 mcg  112 mcg Oral ACB  metoprolol tartrate (LOPRESSOR) tablet 12.5 mg  12.5 mg Oral BID  pantoprazole (PROTONIX) tablet 40 mg  40 mg Oral ACB  sertraline (ZOLOFT) tablet 50 mg  50 mg Oral DAILY  glucose chewable tablet 16 g  4 Tablet Oral PRN  
 dextrose (D50W) injection syrg 12.5-25 g  25-50 mL IntraVENous PRN  
 glucagon (GLUCAGEN) injection 1 mg  1 mg IntraMUSCular PRN  
 insulin lispro (HUMALOG) injection   SubCUTAneous AC&HS  furosemide (LASIX) injection 40 mg  40 mg IntraVENous DAILY  enoxaparin (LOVENOX) injection 100 mg  1 mg/kg SubCUTAneous Q12H  aspirin chewable tablet 81 mg  81 mg Oral DAILY Chen Meyer MD

## 2021-05-31 NOTE — PROGRESS NOTES
2205: Pt offered CHG bath and linen change advised since we had done it last night she did not feel she needed to do it gain tonight. IVCU End of Shift Note Tele: 
Significant tele event? none Time/details of tele event: N/A 
 
I&O/ Daily Weight: 
Strict I&O ordered? NO Fluid restriction ordered: NO 
PO intake since midnight: N/A Daily weight ordered: YES Today's weight: 92.5kg    Yesterday's weight: 92.1kg Significant weight gain reported to MD? NO Procedure: NPO order for upcoming test/procedure present? N/A Procedure/test: N/A Date of procedure: N/A Discharge: 
Times ambulated in hallways past shift: 0        Times OOB to chair past shift: 3 Plan/Discharge barriers identified?: none Has this patient received a stent this admission? NO If yes, verify patient has aspirin, antiplatelet, statin, BB, ACE/ARB (for EF < 40%) ordered. N/A If not ordered, which medication is missing? N/A N/A Other concerns: 
Any nurse/patient concerns to be addressed by MD? See notes above Bedside shift change report given to Chriss Almanza RN  (oncoming nurse) by Shilpi Brenner RN (offgoing nurse). Report included the following information SBAR.   
 
Shilpi Brenner RN

## 2021-06-01 NOTE — PROGRESS NOTES
IVCU End of Shift Note Tele: 
Significant tele event? none Time/details of tele event: N/A 
 
I&O/ Daily Weight: 
Strict I&O ordered? NO Fluid restriction ordered: NO 
PO intake since midnight: N/A Daily weight ordered: YES Today's weight: 92.4kg    Yesterday's weight: 92.5kg Significant weight gain reported to MD? NO Procedure: NPO order for upcoming test/procedure present? N/A Procedure/test: N/A Date of procedure: N/A Discharge: 
Times ambulated in hallways past shift: 0        Times OOB to chair past shift: 4 Plan/Discharge barriers identified?: none Has this patient received a stent this admission? NO If yes, verify patient has aspirin, antiplatelet, statin, BB, ACE/ARB (for EF < 40%) ordered. N/A If not ordered, which medication is missing? N/A N/A Other concerns: 
Any nurse/patient concerns to be addressed by MD? PT is supposed to have CABG but none of the standard testing has been ordered. Bedside shift change report given to Justin Brunner (oncoming nurse) by Malachi Fonseca RN (offgoing nurse). Report included the following information SBAR.   
 
Malachi Fonseca RN

## 2021-06-01 NOTE — CARDIO/PULMONARY
Cardiac Rehab Note: chart review/referral  
 
Pt is a 77 yo admitted with mitral stenosis, s/p cardiac cath with severe single vessel CAD, CTS consulted with plans for surgery in process. Smoking history assessed. Patient is a former smoker. Smoking Cessation Program link has not been added to the AVS. EF 55%  on 5/31/21 per cath. CABG and Valve educational folder with \"Cardiac Surgery Post-Op Instructions\" book, heart healthy eating, warning signs, heart facts, heart aware, resources, and CABG and Valve Surgery booklet to Bradford Lazo on 6/1/21. Educated using teach back method. Assessed patient's understanding of diagnosis and upcoming surgery. Reviewed general pre-op instructions including the need for thermometer and scale post-op, use of incentive spirometer, understanding of pain scale, and answered general post-surgery questions. Bradford Lazo was instructed on  proper use of incentive spirometer. She is familiar with device due to history of post-op mastectomy. Discussed risk factors for CAD to include the following: family history, elevated BMI, hyperlipidemia, hypertension, diabetes, and stress. Encouraged patient to consider participation in a Cardiac Rehab Program, after discharge, to assist with their risk modification and management. Pt concerned about generalized weakness that she already has at the present time. Advised that PT/OT will assist in her disposition at time of d/c and can make recommendations to IP Rehab if needed. Bradford Lazo shared that her  is a recent CABG pt at home recovering with [facility] services in place at the present time. She verbalized understanding with questions answered. CP Rehab will continue to follow and educate as indicated.

## 2021-06-01 NOTE — PROGRESS NOTES
Problem: Falls - Risk of 
Goal: *Absence of Falls Description: Document Torey Taylor Fall Risk and appropriate interventions in the flowsheet. 6/1/2021 0342 by Asa Salas RN Outcome: Progressing Towards Goal 
Note: Fall Risk Interventions: 
  
 
  
 
Medication Interventions: Evaluate medications/consider consulting pharmacy, Patient to call before getting OOB, Teach patient to arise slowly 5/31/2021 2017 by Asa Salas RN Outcome: Progressing Towards Goal 
Note: Fall Risk Interventions: 
  
 
  
 
Medication Interventions: Evaluate medications/consider consulting pharmacy, Patient to call before getting OOB, Teach patient to arise slowly

## 2021-06-01 NOTE — PROGRESS NOTES
Problem: Falls - Risk of 
Goal: *Absence of Falls Description: Document [place] Fall Risk and appropriate interventions in the flowsheet. Outcome: Progressing Towards Goal 
Note: Fall Risk Interventions: 
  
 
  
 
Medication Interventions: Evaluate medications/consider consulting pharmacy, Patient to call before getting OOB, Teach patient to arise slowly

## 2021-06-01 NOTE — PROGRESS NOTES
1447 - Pt sedated with 2mg Versed and 25mcg Fentanyl for ANA MARIA (monitored sedation from 1432 to 1447)

## 2021-06-01 NOTE — PROGRESS NOTES
Brief Procedure Note Patient: Hung Gunter MRN: [id number]  SSN: [id number] YOB: 1955  Age: 76 y.o. Sex: female Date of Procedure: 6/1/2021 Pre-procedure Diagnosis: MS 
 
Post-procedure Diagnosis: Nml LVEF, mod MS, mod TR w/ dilated TV annulus, no ANA CRISTINA clot, no PFO Procedure: ANA MARIA Performed By: Ayana Rubalcava III, DO Anesthesia: Moderate Sedation Estimated Blood Loss: Less than 10 mL Specimens:  None Findings: as above Complications: None Implants: None Recommendations: Continue medical therapy. Discuss with Dr. Kyle Wick. Signed By: Raiza Darling DO   
 June 1, 2021

## 2021-06-01 NOTE — PROGRESS NOTES
[facility] FLOOR Progress Note Admit Date: 2021 Subjective:  
Pt seen with Dr. Linwood Donnelly. Afebrile, on 4L NC. Up in chair. No complaints. Objective:  
 
Visit Vitals BP (!) 121/57 Pulse 73 Temp 97.9 °F (36.6 °C) Resp 18 Ht 5' 1\" (1.549 m) Wt 203 lb 11.3 oz (92.4 kg) SpO2 97% BMI 38.49 kg/m² Temp (24hrs), Av °F (36.7 °C), Min:97.6 °F (36.4 °C), Max:98.2 °F (36.8 °C) Last 24hr Input/Output: 
 
Intake/Output Summary (Last 24 hours) at 2021 3470 Last data filed at 2021 1733 Gross per 24 hour Intake 480 ml Output 360 ml Net 120 ml  
  
 
EKG: NSR Oxygen: 4L NC 
 
CXR:  
CXR Results  (Last 48 hours) None Admission Weight: Last Weight Weight: 225 lb (102.1 kg) Weight: 203 lb 11.3 oz (92.4 kg) EXAM: 
   
Lungs:   Clear to auscultation bilaterally. Heart:  Regular rate and rhythm, S1, S2 normal, no murmur, click, rub or gallop. Abdomen:   Soft, non-tender. Bowel sounds normal. No masses,  No organomegaly. Extremities:  No edema. PPP. Neurologic:  Gross motor and sensory apparatus intact. Activity: 
 
Diet:   
 
Lab Data Reviewed:  
Recent Labs  
  21 
0290 WBC 9.3 HGB 11.7 HCT 36.2  CREA 0.85 Cardiac Testing:  
 
 
Assessment:  
 
Active Problems: 
  Nonrheumatic mitral valve stenosis (2021) Coronary artery disease involving native coronary artery of native heart with angina pectoris ([address] (2021) Mitral stenosis (2021) Plan/Recommendations/Medical Decision Makin. Severe mitral stenosis/Mod to severe MR/Severe TR: Surgical plans per Dr. Linwood Donnelly. Get ANA MARIA preop -discussed with Dr. Cheri Carpenter. 2. 1v CAD:  Surgical plans per team.  
3. Severe pulm HTN: 80/40s. Cont diuretics. 4. Chronic A fib (hx of two DCCV): on eliquis, flecinide, BB PTA. Currently on lovenox for anticoagulation and metoprolol. In NSR this morning. Possible ANA CRISTINA clip/MAZE.  
5. DM2: on amaryl. Hold metformin. BS ACHS, SSI Per orders. 6. Hypothyroid: on synthroid 7. Former tobacco smoker: Seen by Pulmonary in 2020, PFTs did not reveal COPD. 8. Breast cancer s/p R mastectomy and XRT: on femura 9. GERD:  On prilosec PTA. 10. RODOLFO: Had sleep study in February 2021, showed RODOLFO, has not had fu appointment for CPAP yet. 11. Chronic hypoxic respiratory failure: On home O2, get PFT results from pulmonary. Has seen Dr. Malathi Augustin in the past.  
12. Dispo: preop workup in process, surgical plans per  [facility]. Signed By: Doretha Moscoso NP Addendum: Pt seen and examined. Images reviewed. Agree with NP Jacque Herrera note. She is a good candidate for an MVR and LIMA-LAD with ANA CRISTINA clip, possible TV repair. She is currently in sinus rhythm. Will consider a concomitant maze if she goes back in to afib. I would like a ANA MARIA to further eval her MR/MS and a dry CT to look at her aorta, lungs and presence of MAC. Discussed with Dr. Marian Asher, will follow daily.

## 2021-06-01 NOTE — PROGRESS NOTES
TRANSFER - OUT REPORT: 
 
Verbal report given dean Barros's IVCU RN, on Champ Riding  being transferred back to room for ordered procedure of ANA MARIA which pt tolerated well w/conscious sedation of 2 mg IV Verse and 25 mcg Fentanyl IV - pt's throat was numbed with viscous lidocaine prior to procedure, pt may start with sips of water at 1630 and progress diet as tolerated. Report consisted of patients Situation, Background, Assessment and Recommendations(SBAR). Information from the following report(s) Procedure Summary, MAR and Cardiac Rhythm Sinus was reviewed with the receiving nurse. Opportunity for questions and clarification was provided. Patient transported with pocket heart monitor and 3LNC. Pt denies any pain or discomfort.

## 2021-06-02 NOTE — PROGRESS NOTES
Pt was given a complete shower and all linens were changed. Pt experienced no sob or cp. Slept well and ambulated throughout the night.

## 2021-06-02 NOTE — PROGRESS NOTES
Spiritual Care Assessment/Progress Note [facility] 
 
 
NAME: Moose Mack      MRN: [id number] AGE: 76 y.o. SEX: female Sabianism Affiliation: Mandaen  
Language: English  
 
6/2/2021     Total Time (in minutes): 30 Spiritual Assessment begun in [facility] 2 INTRVNTNL CARDIO through conversation with: 
  
    [x]Patient        [] Family    [] Friend(s) Reason for Consult: Initial/Spiritual assessment, patient floor Spiritual beliefs: (Please include comment if needed) [x] Identifies with a raj tradition: Mandaen   
   [x] Supported by a raj community: Her Episcopal was splitted 3 years ago      
   [] Claims no spiritual orientation:       
   [] Seeking spiritual identity:            
   [] Adheres to an individual form of spirituality:       
   [] Not able to assess:                   
 
    
Identified resources for coping:  
   [x] Prayer                           
   [] Music                  [] Guided Imagery [x] Family/friends                 [] Pet visits [] Devotional reading                         [] Unknown 
   [] Other:                                          
 
 
Interventions offered during this visit: (See comments for more details) Patient Interventions: Prayer (actual), Life review/legacy, Iconic (affirming the presence of God/Higher Power), Coping skills reviewed/reinforced, Affirmation of emotions/emotional suffering, Affirmation of raj, Initial visit, Initial/Spiritual assessment, patient floor, Normalization of emotional/spiritual concerns, Catharsis/review of pertinent events in supportive environment Plan of Care: 
 
 [] Support spiritual and/or cultural needs  
 [] Support AMD and/or advance care planning process    
 [] Support grieving process 
 [] Coordinate Rites and/or Rituals  
 [] Coordination with community clergy  [] No spiritual needs identified at this time 
 [] Detailed Plan of Care below (See Comments)  [] Make referral to Music Therapy 
[] Make referral to Pet Therapy    
[] Make referral to Addiction services 
[] Make referral to [facility] 
[] Make referral to Spiritual Care Partner 
[] No future visits requested       
[x] Follow up upon further referrals Comments:  
 
Initial Spiritual Care Assessment visit for Ms. Idelia Boeck in 2163. Patient was alert, no family was present. Patient shared about her life journey and medical journey. She talked how much she loves her family, children, grand children and her dog. Her medical journey that her oncoming surgery and unexpected hospitalization. Her spiritual journey that her Zoroastrianism was splitted 3 years ago.  prayed for her successful surgery, she was thankful. Invited  again. Advised of  availability. [id number] M.S., Th.M. 
Spiritual Care Provider  Paging Service [phone])

## 2021-06-02 NOTE — PROGRESS NOTES
[facility] FLOOR Progress Note Admit Date: 2021 Subjective:  
Pt seen with Dr. Linwood Donnelly. Afebrile, on RA. Up in chair. No complaints. Objective:  
 
Visit Vitals /65 (BP 1 Location: Left upper arm, BP Patient Position: At rest) Pulse 87 Temp 98.3 °F (36.8 °C) Resp 18 Ht 5' 1\" (1.549 m) Wt 219 lb 9.3 oz (99.6 kg) SpO2 94% BMI 41.49 kg/m² Temp (24hrs), Av.2 °F (36.8 °C), Min:98 °F (36.7 °C), Max:98.3 °F (36.8 °C) Last 24hr Input/Output: 
 
Intake/Output Summary (Last 24 hours) at 2021 314 Last data filed at 2021 9173 Gross per 24 hour Intake  Output 650 ml Net -650 ml  
  
 
EKG: NSR Oxygen: RA 
 
CXR:  
CXR Results  (Last 48 hours) 21 1016  XR CHEST PORT Final result Impression:  No Acute Disease. Narrative:  EXAM: Portable CXR. 0940 hours. INDICATION: Preop CABG/MVR  
   
FINDINGS:  
The lungs appear clear. Heart is normal in size. There is no pulmonary edema. There is no evident pneumothorax or pleural effusion. Admission Weight: Last Weight Weight: 225 lb (102.1 kg) Weight: 219 lb 9.3 oz (99.6 kg) EXAM: 
   
Lungs:   Clear to auscultation bilaterally. Heart:  Regular rate and rhythm, S1, S2 normal, no murmur, click, rub or gallop. Abdomen:   Soft, non-tender. Bowel sounds normal. No masses,  No organomegaly. Extremities:  No edema. PPP. Neurologic:  Gross motor and sensory apparatus intact. Activity: up ad cristela Diet:  cardiac Lab Data Reviewed:  
Recent Labs  
  21 
6080 WBC 9.3 HGB 11.7 HCT 36.2  CREA 0.85 Cardiac Testing:  
 
 
Assessment:  
 
Active Problems: 
  Nonrheumatic mitral valve stenosis (2021) Coronary artery disease involving native coronary artery of native heart with angina pectoris ([address] (2021) Mitral stenosis (2021) Plan/Recommendations/Medical Decision Makin. Severe mitral stenosis/Mod to severe MR/Severe TR: Surgical plans per Dr. Daily Bruno. ANA MARIA completed 6/1 
2. 1v CAD:  Surgical plans per team.  On ASA, BB, no statin historically. 3. Severe pulm HTN: 80/40s. Cont diuretics. 4. Chronic A fib (hx of two DCCV): on eliquis, flecinide, BB PTA. Currently on lovenox for anticoagulation and metoprolol. In NSR this morning. Possible ANA CRISTINA clip/MAZE. 5. DM2: on amaryl. Hold metformin. BS ACHS, SSI Per orders. 6. Hypothyroid: on synthroid 7. Former tobacco smoker: Seen by Pulmonary in 2020, PFTs did not reveal COPD. 8. Breast cancer s/p R mastectomy and XRT: on femura PTA 9. GERD:  On protonix 10. RODOLFO: Had sleep study in February 2021, showed RODOLFO, has not had fu appointment for CPAP yet. 11. Chronic hypoxic respiratory failure: On home O2, get PFT results from pulmonary. Has seen Dr. Johanna Caicedo in the past. On RA this morning. 12. Possible UTI: Abx started by hospitalist. Will need to clear prior to surgery. 13. Dispo: preop workup in process, surgical plans per Dr. Daily Bruno. Likely CABG/MVR/TVr/ANA CRISTINA clip, possible MAZE.  
 
Signed By: Nicho Mckee NP

## 2021-06-02 NOTE — PROGRESS NOTES
Progress Note 6/2/2021 11:05 AM 
NAME: Becka Chester MRN:  [id number] Admit Diagnosis: Chest pain, unspecified type [R07.9] Mitral stenosis [I05.0] Assessment:   
  
1 Severe Mitral stenosis 2 CAD involving LAD  
LHC. RHC: Severe MS, 1v CAD, elevated L/R heart pressures, severe PHTN, mildly reduced CO/CI 3. Persistent AFib w/ RVR. C2V = 4.  ANA MARIA/DCCV 9/20 to SR w/ EF 50-55% and mod MR/TR. Reverted back to AFib. MPI 9/20 w/ EF 38% and no i/i.  10/20 w/ CV to SR. Echo w/ nml LVEF, RVSP 110, mod-sev MS, mild AS, mod-sev MR/TR 2.  DM 
3. HTN 
4. Hypothyroid 5.  R breast CA s/p mastectomy and XRT 6. Former smoker; 3ppd; Q '13 
  
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 
   
  
            Plan:    
ANA MARIA with nml LVEF, sev MS, dilated TV annulus, mod-sev MR, mod TR Off O2 at rest; some need with ambulation 
  
1. CTS consult 2. Stay in house for CABG/MVR/TV repair/ANA CRISTINA exclusion +/- MAZE 3. Continue IV lasix 4. Continue ASA 5. Continue lovenox; hold eliquis 6. Stopped flecainide, losartan 7. Continue metoprolol 8. Medicine consult to discern UTI 9. OK to transfer off IVCU if beds needed  
  
 [x]? High complexity decision making was performed Subjective: HPI: 
No CP Pedal edema/ MARTINEZ improving ROS: no abd pain / n/v, syncope Objective:  
  
Physical Exam: 
 
Last 24hrs VS reviewed since prior progress note. Most recent are: 
 
Visit Vitals /74 Pulse 86 Temp 98.2 °F (36.8 °C) Resp 18 Ht 5' 1\" (1.549 m) Wt 99.6 kg (219 lb 9.3 oz) SpO2 92% BMI 41.49 kg/m² Intake/Output Summary (Last 24 hours) at 6/2/2021 1269 Last data filed at 6/2/2021 8360 Gross per 24 hour Intake  Output 650 ml Net -650 ml General: Alert and oriented x3, no acute distress Neck: Supple Respiratory: No respiratory distress, clear lung sound Cardiovascular: Regular rate rhythm, S1S2, DM Abdomen: soft, non tender, non distended Neuro: moves all extremities, oriented x3 Skin: warm and dry Extremity: trace edema, warm to touch Cath access site unremarkable Data Review Telemetry: Afib Recent Labs  
  06/01/21 
7373 WBC 9.3 HGB 11.7 HCT 36.2  No results for input(s): INR, PTP, APTT, INREXT, INREXT in the last 72 hours. Recent Labs  
  06/01/21 
0332 05/31/21 
0340  136  
K 4.1 4.2  104 CO2 29 28 BUN 19 18 CREA 0.85 0.75 * 109* CA 9.3 9.5 No results for input(s): CPK, CKNDX, TROIQ in the last 72 hours. No lab exists for component: CPKMB Lab Results Component Value Date/Time Cholesterol, total 129 05/24/2021 10:29 AM  
 HDL Cholesterol 41 05/24/2021 10:29 AM  
 LDL, calculated 68.4 05/24/2021 10:29 AM  
 Triglyceride 98 05/24/2021 10:29 AM  
 CHOL/HDL Ratio 3.1 05/24/2021 10:29 AM  
 
 
Recent Labs 05/31/21 
0340 AP 54  
TP 7.4 ALB 3.4*  
GLOB 4.0 No results for input(s): PH, PCO2, PO2 in the last 72 hours. Medications Personally Reviewed: 
 
Current Facility-Administered Medications Medication Dose Route Frequency  potassium chloride SR (KLOR-CON 10) tablet 20 mEq  20 mEq Oral DAILY  [Held by provider] losartan (COZAAR) tablet 25 mg  25 mg Oral DAILY  sodium chloride (NS) flush 5-40 mL  5-40 mL IntraVENous Q8H  
 sodium chloride (NS) flush 5-40 mL  5-40 mL IntraVENous PRN  
 acetaminophen (TYLENOL) tablet 650 mg  650 mg Oral Q6H PRN Or  
 acetaminophen (TYLENOL) suppository 650 mg  650 mg Rectal Q6H PRN  polyethylene glycol (MIRALAX) packet 17 g  17 g Oral DAILY PRN  promethazine (PHENERGAN) tablet 12.5 mg  12.5 mg Oral Q6H PRN Or  
 ondansetron (ZOFRAN) injection 4 mg  4 mg IntraVENous Q6H PRN  
 glimepiride (AMARYL) tablet 2 mg  2 mg Oral DAILY  levothyroxine (SYNTHROID) tablet 112 mcg  112 mcg Oral ACB  metoprolol tartrate (LOPRESSOR) tablet 12.5 mg  12.5 mg Oral BID  pantoprazole (PROTONIX) tablet 40 mg  40 mg Oral ACB  sertraline (ZOLOFT) tablet 50 mg  50 mg Oral DAILY  glucose chewable tablet 16 g  4 Tablet Oral PRN  
 dextrose (D50W) injection syrg 12.5-25 g  25-50 mL IntraVENous PRN  
 glucagon (GLUCAGEN) injection 1 mg  1 mg IntraMUSCular PRN  
 insulin lispro (HUMALOG) injection   SubCUTAneous AC&HS  furosemide (LASIX) injection 40 mg  40 mg IntraVENous DAILY  enoxaparin (LOVENOX) injection 100 mg  1 mg/kg SubCUTAneous Q12H  aspirin chewable tablet 81 mg  81 mg Oral DAILY Mitzi Rinne III, DO

## 2021-06-02 NOTE — CONSULTS
Hospitalist Consultation Note NAME:  Shereen Cadet :   1955 MRN:   [id number] ATTENDING: Jose Dawkins DO 
PCP:  Inez Tompkins MD 
 
Date/Time:  2021 11:01 AM 
 
 
Recommendations/Plan: Active Problems: 
  Nonrheumatic mitral valve stenosis (2021) Coronary artery disease involving native coronary artery of native heart with angina pectoris ([address] (2021) Mitral stenosis (2021) Severe mitral stenosis/Mod to severe MR/Severe TR: Surgical plans per Dr. Kem Ivey. ANA MARIA completed   
 
.1v CAD:  Surgical plans per team.  On ASA, BB, no statin historically. Urinary tract infection: Patient reports having symptoms of dysuria for about a week, she also reports urgency and frequency which has been exacerbated by using IV diuretics UA on admission was positive for 3+ bacteria and leuk esterase Due to the presence of symptoms we will treat the urine tract infection with cefdinir 3 mg twice daily for 3 days Severe pulm HTN: 80/40s. Cont diuretics.    
 
Chronic A fib (hx of two DCCV): on eliquis, flecinide, BB PTA. Currently on lovenox for anticoagulation and metoprolol. In NSR this morning. Possible ANA CRISTINA clip/MAZE. DM2: on amaryl. Hold metformin.  BS ACHS, SSI Per orders. Hypothyroid: on synthroid Former tobacco smoker: Seen by Pulmonary in , PFTs did not reveal COPD. Breast cancer s/p R mastectomy and XRT: on femura PTA GERD:  On protonix RODOLFO: Had sleep study in 2021, showed RODOLFO, has not had fu appointment for CPAP yet. Chronic hypoxic respiratory failure: On home O2, get PFT results from pulmonary. Has seen Dr. Bibi Cooper in the past. On RA this morning. Code Status: Full code DVT Prophylaxis: Currently on Lovenox Subjective:  
REQUESTING PHYSICIAN: Dr. Gaye Sin REASON FOR CONSULT:    Evaluation and treatment of UTI Airam Self is a 76 y.o.    female with past medical history significant for breast cancer status post right mastectomy and radiation therapy, GERD, obstructive sleep apnea, diabetes and hypertension who is admitted and being evaluated for shortness of breath and left heart catheterization revealed single-vessel coronary artery disease with echocardiogram suggesting severe mitral valve and tricuspid valve regurgitation with moderate mitral valve stenosis. Plan is for CABG with single-vessel bypass and valvular replacement along with Maze procedure. Medical consultation is requested for urinary symptoms and positive urinalysis. Patient reports that she was having dysuria prior to admission and her PCP checked her as an outpatient with no evidence of UTI so she was not treated. Her UA on admission was positive for 3+ bacteria and leuk esterase. In the time of evaluation patient does not report any dysuria but reports urgency and frequency which has been exacerbated by the use of diuretics Due to the presence of dysuria prior to admission and presence of urgency and frequency it is warranted that patient should be treated with antibiocs She denies any fever, chills, cough, sputum production, nausea or vomiting Past Medical History:  
Diagnosis Date [facility] Patient [facility]) September 9th 2020  Abscess of chest wall 8/10/2017  Acquired hypothyroidism 2/21/2018  Acute asthmatic bronchitis 8/10/2017  Breast cancer ([address] 2015 Rt mastectomy  Cancer ([address] right breast  
 Cigarette nicotine dependence in remission 8/22/2017  Diabetes ([address]  Dyspepsia and other specified disorders of function of stomach  Family history of colon cancer in mother 8/22/2018  GERD (gastroesophageal reflux disease)  GERD without esophagitis 8/22/2017  Hyperglycemia 8/10/2017  Hypertension  Impacted cerumen of right ear 8/10/2017  Mood swings 8/10/2017  Nausea & vomiting  Obesity 8/10/2017  Urticaria 8/10/2017  Vitamin D deficiency 2018 Past Surgical History:  
Procedure Laterality Date [address]  HX BREAST BIOPSY Right 2016 RIGHT BREAST DEBRIDEMENT OF MASTECTOMY SITE performed by Ricci Toro MD at [facility]  
 tonsillectomy  HX MASTECTOMY Right 12/3/2015 RIGHT BREAST SIMPLE MASTECTOMY WITH RIGHT SENTINEL NODE BIOPSY performed by Ricci Toro MD at \[facility]\"" AMBULATORY OR Social History Tobacco Use  Smoking status: Former Smoker Packs/day: 1.00 Years: 30.00 Pack years: 30.00 Types: Cigarettes Quit date: 12/3/2015 Years since quittin.5  Smokeless tobacco: Never Used  Tobacco comment: vaped for one year after quitting smoking Substance Use Topics  Alcohol use: No  
  Alcohol/week: 0.0 standard drinks Family History Problem Relation Age of Onset  Cancer Father   
     lung  Osteoporosis Mother  Arthritis-osteo Sister  Cancer Paternal Aunt   
     breast cancer, late 60's-early 's Reviewed Allergies Allergen Reactions  Latex Rash  Antihistamine [Diphenhydramine Hcl] Unknown (comments)  Iodine Rash  Novocain [Procaine] Palpitations Prior to Admission medications Medication Sig Start Date End Date Taking? Authorizing Provider  
valsartan-hydroCHLOROthiazide (DIOVAN-HCT) 80-12.5 mg per tablet TAKE 1 TABLET BY MOUTH EVERY DAY 21  Yes Gifty Trevino MD  
glimepiride (AMARYL) 2 mg tablet Take 1 Tablet by mouth every morning. 21  Yes Gifty Trevino MD  
sertraline (ZOLOFT) 50 mg tablet TAKE 1 TABLET BY MOUTH EVERY DAY 21  Yes Gifty Trevino MD  
metoprolol tartrate (LOPRESSOR) 25 mg tablet Take 0.5 Tabs by mouth two (2) times a day. 10/29/20  Yes Arcelia Valerio III, DO  
cholecalciferol, VITAMIN D3, (VITAMIN D3) 5,000 unit tab tablet Take  by mouth daily.    Yes Provider, Historical  
Omeprazole delayed release (PRILOSEC D/R) 20 mg tablet Take 20 mg by mouth daily. Yes Provider, Historical  
metFORMIN (GLUCOPHAGE) 500 mg tablet TAKE 1 TAB BY MOUTH TWO (2) TIMES DAILY (WITH MEALS). 5/20/21   Risser, Ilah Leyden, MD  
levothyroxine (SYNTHROID) 112 mcg tablet TAKE 1 TABLET BY MOUTH EVERY DAY BEFORE BREAKFAST 5/20/21   Risser, Ilah Leyden, MD  
apixaban (Eliquis) 5 mg tablet Take 5 mg by mouth two (2) times a day. Other, MD Sabine  
letrozole [facility]) 2.5 mg tablet  7/23/16   Provider, Historical  
 
 
REVIEW OF SYSTEMS:    
Total of 12 systems reviewed as follows:  
I am not able to complete the review of systems because: The patient is intubated and sedated The patient has altered mental status due to his acute medical problems The patient has baseline aphasia from prior stroke(s) The patient has baseline dementia and is not reliable historian POSITIVE= underlined text  Negative = text not underlined General:  fever, chills, sweats, generalized weakness, weight loss/gain,  
   loss of appetite Eyes:    blurred vision, eye pain, loss of vision, double vision ENT:    rhinorrhea, pharyngitis Respiratory:   cough, sputum production, SOB, wheezing, MARTINEZ, pleuritic pain  
Cardiology:   chest pain, palpitations, orthopnea, PND, edema, syncope Gastrointestinal:  abdominal pain , N/V, dysphagia, diarrhea, constipation, bleeding Genitourinary:  frequency, urgency, dysuria, hematuria, incontinence Muskuloskeletal :  arthralgia, myalgia Hematology:  easy bruising, nose or gum bleeding, lymphadenopathy Dermatological: rash, ulceration, pruritis Endocrine:   hot flashes or polydipsia Neurological:  headache, dizziness, confusion, focal weakness, paresthesia, Speech difficulties, memory loss, gait disturbance Psychological: Feelings of anxiety, depression, agitation Objective: VITALS:   
Visit Vitals BP 98/86 Pulse 82 Temp 98.3 °F (36.8 °C) Resp 18 Ht 5' 1\" (1.549 m) Wt 99.6 kg (219 lb 9.3 oz)  
SpO2 94% BMI 41.49 kg/m² Temp (24hrs), Av.2 °F (36.8 °C), Min:98 °F (36.7 °C), Max:98.3 °F (36.8 °C) PHYSICAL EXAM:  
General:    Alert, cooperative, no distress, appears stated age. HEENT: Atraumatic, anicteric sclerae, pink conjunctivae No oral ulcers, mucosa moist, throat clear Neck:  Supple, symmetrical,  thyroid: non tender Lungs:   Clear to auscultation bilaterally. No Wheezing or Rhonchi. No rales. Chest wall:  No tenderness  No Accessory muscle use. Heart:   Regular  rhythm,  No  murmur   No edema Abdomen:   Soft, non-tender. Not distended. Bowel sounds normal 
Extremities: No cyanosis. No clubbing Skin:     Not pale. Not Jaundiced  No rashes Psych:  Good insight. Not depressed. Not anxious or agitated. Neurologic: EOMs intact. No facial asymmetry. No aphasia or slurred speech. Symmetrical strength, Alert and oriented X 4.  
 
_______________________________________________________________________ Care Plan discussed with: 
  Comments Patient x Family RN x Care Manager Consultant:     
____________________________________________________________________ 35 mins Comments  
 x Reviewed previous records  
>50% of visit spent in counseling and coordination of care x Discussion with patient and/or family and questions answered Critical Care Provided     Minutes non procedure based 
________________________________________________________________________ Signed: Vidal Grijalva MD 
 
 
Procedures: see electronic medical records for all procedures/Xrays and details which were not copied into this note but were reviewed prior to creation of Plan. LAB DATA REVIEWED:   
Recent Results (from the past 24 hour(s)) GLUCOSE, POC Collection Time: 21 11:41 AM  
Result Value Ref Range Glucose (POC) 197 (H) 65 - 117 mg/dL Performed by Mignon Medrano GLUCOSE, POC  Collection Time: 21  4:42 PM  
Result Value Ref Range Glucose (POC) 89 65 - 117 mg/dL Performed by Froilan Sanchez GLUCOSE, POC Collection Time: 06/01/21  9:13 PM  
Result Value Ref Range Glucose (POC) 120 (H) 65 - 117 mg/dL Performed by Amanda Gagnon GLUCOSE, POC Collection Time: 06/02/21  7:10 AM  
Result Value Ref Range Glucose (POC) 119 (H) 65 - 117 mg/dL Performed by Amanda Gagnon   
 
 
_____________________________ Hospitalist: Danish Berry MD

## 2021-06-02 NOTE — PROGRESS NOTES
Cardiac Surgery Care Coordinator-  Met with Armando Owen and . Introduced role of the Cardiac Surgery Care Coordinator. Reviewed plan of care and began pre-op education. Discussed day of surgery expectations for the pt and family. Instructed pt on the proper use of the incentive spirometer, and she is able to pull 1750ml. Reviewed material in the valve/CABG educational folder including Cardiac Surgery Pathway. Reinforced sternal precautions and keeping your move in the tube. Encouraged Armando Owen and  to verbalize and offered emotional support.  Pascual Chen RN

## 2021-06-02 NOTE — PROGRESS NOTES
11:00 - Bedside report rec'd from [facility]. [facility] End of Shift Note Tele: 
Significant tele event? none Time/details of tele event: N/A 
 
I&O/ Daily Weight: 
Strict I&O ordered? YES Fluid restriction ordered: NO 
PO intake since midnight: 640mL Daily weight ordered: YES Today's weight: 99.6kg    Yesterday's weight:   
Significant weight gain reported to MD? NO Procedure: NPO order for upcoming test/procedure present? N/A Procedure/test: N/A Date of procedure: N/A Discharge: 
Times ambulated in hallways past shift: 1        Times OOB to chair past shift: 3 Plan/Discharge barriers identified?: none Has this patient received a stent this admission? NO If yes, verify patient has aspirin, antiplatelet, statin, BB, ACE/ARB (for EF < 40%) ordered. N/A If not ordered, which medication is missing? N/A N/A Other concerns: 
Any nurse/patient concerns to be addressed by MD? none Bedside shift change report given to Caty Bass RN (oncoming nurse) by Jose Armando Parnell RN (offgoing nurse). Report included the following information SBAR, Kardex, Intake/Output, MAR, Recent Results and Cardiac Rhythm SR. Jose Armando Parnell RN

## 2021-06-02 NOTE — PROGRESS NOTES
Physician Progress Note PATIENTMinda Gibbons 
CSN #:                  [id number] :                       1955 ADMIT DATE:       2021 8:03 AM 
DISCH DATE: 
RESPONDING 
PROVIDER #:        Renetta BENDER DO 
 
 
 
 
QUERY TEXT: 
 
Pt admitted with severe MS. Pt noted to have ua: 3 bact, pos nit, trace le, few epith. If possible, please document in the progress notes and discharge summary if you are evaluating and/or treating any of the following: The medical record reflects the following: 
Risk Factors: DM Clinical Indicators: ua: 3 bact, pos nit, trace le, few epith Treatment: cx pending? Thanks, Martin Ng RN/CDI  Options provided: 
-- Urinary Tract Infection (UTI) 
-- Bacteriuria 
-- Other - I will add my own diagnosis -- Disagree - Not applicable / Not valid -- Disagree - Clinically unable to determine / Unknown 
-- Refer to Clinical Documentation Reviewer PROVIDER RESPONSE TEXT: 
 
Medicine consulted to evaluate Query created by: Kel Austin on 2021 4:14 PM 
 
 
Electronically signed by:  Sage Benoit DO 2021 8:28 AM

## 2021-06-02 NOTE — PROGRESS NOTES
Transition of Care Plan: 
 
RUR: 14% - low risk Disposition: TBD. Home vs Home with [facility] Follow up appointments: PCP, Cardiology, Cardiothoracic Surgery DME needed: TBD. Has rollator, shower chair, and home oxygen ([facility]) Transportation at Discharge:  Keys or means to access home:   yes  Medicare letter: 2nd  Medicare letter to be given prior to discharge. Caregiver Contact: Sundeep Ramírez, spouse, [phone] Discharge Caregiver contacted prior to discharge?  yes In review of chart, pt is not medically stable for discharge. She is currently receiving preop workup for surgery (CABG/MVR). CM at bedside and talked to pt and . Pt states that she will be having surgery on Friday. Also, when CM asked her about [facility] services, she said she will let CM know because it was not [facility]. CM left her phone number on white board for pt. She informed CM she will call her if she doesn't see her tomorrow. Pt in good spirits. Unit CM will continue to follow. Lenore Chavez, RN, BSN, [address] Care Manager [phone]

## 2021-06-03 NOTE — PROGRESS NOTES
Hospitalist Progress Note NAME: Ruddy Burleson :  1955 MRN:  [id number] Assessment / Plan: UTI 
UA suggestive of UTI. Pt with dysuria, urgency and incr frequency for ~1 week. No Ucx sent and pt was started on omnicef. Her urinary symptoms has improved since admission. Will cont' for 3 days. Severe mitral stenosis/Mod to severe MR/Severe TR: Surgical plans per Dr. Susy Prieto completed   
  
1v CAD:  Surgical plans per team.  On ASA, BB, no statin historically. Severe pulm HTN: 80/40s. Cont diuretics.    
  
Chronic A fib (hx of two DCCV): on eliquis, flecinide, BB PTA. Currently on lovenox for anticoagulation and metoprolol. Possible ANA CRISTINA clip/MAZE. 
  
DM2: on amaryl. Hold metformin.  SSI 
  
Hypothyroid: on synthroid 
  
Former tobacco smoker: Seen by Pulmonary in , PFTs did not reveal COPD.  
  
Breast cancer s/p R mastectomy and XRT: on femura PTA 
  
GERD:  On protonix 
  
RODOLFO: Had sleep study in 2021, showed RODOLFO, has not had fu appointment for CPAP yet.  
  
Chronic hypoxic respiratory failure: On home O2, currently on RA this AM.  Has seen Dr. Jason Tucker in the past.   
  
  
  
Code Status: Full code DVT Prophylaxis: Currently on Lovenox Subjective: Chief Complaint / Reason for Physician Visit Pt awake, up in chair. She has no complaints. Urinary symptoms has resolved. Discussed with RN events overnight. Review of Systems: 
Symptom Y/N Comments  Symptom Y/N Comments Fever/Chills n   Chest Pain n   
Poor Appetite    Edema Cough    Abdominal Pain Sputum    Joint Pain SOB/MARTINEZ n   Pruritis/Rash Nausea/vomit    Tolerating PT/OT Diarrhea    Tolerating Diet Constipation    Other Could NOT obtain due to:   
 
Objective: VITALS:  
Last 24hrs VS reviewed since prior progress note.  Most recent are: 
Patient Vitals for the past 24 hrs: 
 Temp Pulse Resp BP SpO2  
21 0748 98.9 °F (37.2 °C) 100 16 121/66 96 % 06/03/21 0244 98 °F (36.7 °C) 86 19 115/64 99 % 06/03/21 0000 98 °F (36.7 °C) 77 18 (!) 112/52 96 % 06/02/21 1900 98.2 °F (36.8 °C) 77 18 116/69 97 % 06/02/21 1735  (!) 104  (!) 115/51   
06/02/21 1535 98.4 °F (36.9 °C) (!) 101 20 111/69 96 % 06/02/21 1107 98.2 °F (36.8 °C) 85 18 (!) 109/91 91 % 06/02/21 1045  82  98/86   
06/02/21 0858  84  (!) 97/59  Intake/Output Summary (Last 24 hours) at 6/3/2021 9722 Last data filed at 6/3/2021 7219 Gross per 24 hour Intake 1370 ml Output 1185 ml Net 185 ml I had a face to face encounter and independently examined this patient on 6/3/2021, as outlined below: PHYSICAL EXAM: 
General: WD, WN. Alert, cooperative, no acute distress EENT:  EOMI. Anicteric sclerae. MMM Resp:  CTA bilaterally, no wheezing or rales. No accessory muscle use CV:  Regular  rhythm,  No edema GI:  Soft, Non distended, Non tender. +Bowel sounds Neurologic:  Alert and oriented X 3, normal speech, Psych:   Good insight. Not anxious nor agitated Skin:  No rashes. No jaundice Reviewed most current lab test results and cultures  YES Reviewed most current radiology test results   YES Review and summation of old records today    NO Reviewed patient's current orders and MAR    YES 
PMH/SH reviewed - no change compared to H&P 
________________________________________________________________________ Care Plan discussed with: 
  Comments Patient x Family RN x Care Manager Consultant Multidiciplinary team rounds were held today with , nursing, pharmacist and clinical coordinator. Patient's plan of care was discussed; medications were reviewed and discharge planning was addressed. ________________________________________________________________________ Total NON critical care TIME:  35   Minutes Total CRITICAL CARE TIME Spent:   Minutes non procedure based Comments >50% of visit spent in counseling and coordination of care    
________________________________________________________________________ Ramandeep Ferguson MD  
 
Procedures: see electronic medical records for all procedures/Xrays and details which were not copied into this note but were reviewed prior to creation of Plan. LABS: 
I reviewed today's most current labs and imaging studies. Pertinent labs include: 
Recent Labs  
  06/03/21 
0244 06/01/21 
2983 WBC 9.8 9.3 HGB 11.7 11.7 HCT 35.9 36.2  231 Recent Labs  
  06/03/21 
0244 06/01/21 
0469 * 136  
K 4.1 4.1  103 CO2 26 29 * 126* BUN 17 19 CREA 0.84 0.85 CA 9.4 9.3 Signed: Ramandeep Ferguson MD

## 2021-06-03 NOTE — PROGRESS NOTES
In a CM perspective, pt is ready for discharge. RN made aware. Transition of Care Plan: 
  
RUR: 14% - low risk Disposition: Home Follow up appointments: PCP, Cardiology, Cardiothoracic Surgery DME needed: No needs identified. Has rollator, shower chair, and home oxygen ([facility]) Transportation at Discharge:  Keys or means to access home:   yes  Medicare letter: 2nd  Medicare letter received and signed 6/3 Caregiver Contact: Zion Ngo, spouse, [phone] Discharge Caregiver contacted prior to discharge? yes CM at bedside to discuss discharge planning. Pt is going home and returning on 6/9 for CABG/MVR. Pt has already contacted her  and he is in the parking lot. Pt verbalized understanding on discharge planning. Pt has no questions or concerns for CM. States she will find out the [facility] agency and have the name when she returns. In a CM perspective, pt is ready for discharge. RN made aware. Care Management Interventions PCP Verified by CM: Yes Last Visit to PCP: 05/20/21 Mode of Transport at Discharge: Other (see comment) () Transition of Care Consult (CM Consult): Discharge Planning MyChart Signup: No 
Discharge Durable Medical Equipment: No 
Physical Therapy Consult: No 
Occupational Therapy Consult: No 
Speech Therapy Consult: No 
Current Support Network: Lives with Spouse Confirm Follow Up Transport: Family The Plan for Transition of Care is Related to the Following Treatment Goals : home The Patient and/or Patient Representative was Provided with a Choice of Provider and Agrees with the Discharge Plan?: Yes Name of the Patient Representative Who was Provided with a Choice of Provider and Agrees with the Discharge Plan: patient Freedom of Choice List was Provided with Basic Dialogue that Supports the Patient's Individualized Plan of Care/Goals, Treatment Preferences and Shares the Quality Data Associated with the Providers?: Yes [employer] Information Provided?: No 
Discharge Location Discharge Placement: Home Frankey Holland, RN, BSN, [address] [facility] Care Manager [phone]

## 2021-06-03 NOTE — PROGRESS NOTES
Progress Note 6/3/2021 11:05 AM 
NAME: Belvie Sever MRN:  [id number] Admit Diagnosis: Chest pain, unspecified type [R07.9] Mitral stenosis [I05.0] Assessment:   
  
1 Severe Mitral stenosis 2 CAD involving LAD  
LHC. RHC: Severe MS, 1v CAD, elevated L/R heart pressures, severe PHTN, mildly reduced CO/CI 3. Persistent AFib w/ RVR. C2V = 4.  ANA MARIA/DCCV 9/20 to SR w/ EF 50-55% and mod MR/TR. Reverted back to AFib. MPI 9/20 w/ EF 38% and no i/i.  10/20 w/ CV to SR. Echo w/ nml LVEF, RVSP 110, mod-sev MS, mild AS, mod-sev MR/TR 2.  DM 
3. HTN 
4. Hypothyroid 5.  R breast CA s/p mastectomy and XRT 6. Former smoker; 3ppd; Q '13 
  
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 
   
  
            Plan:    
ANA MARIA with nml LVEF, sev MS, dilated TV annulus, mod-sev MR, mod TR Off O2 at rest; some need with ambulation 
  
1. Home today 2. For CABG/MVR/TV repair/ANA CRISTINA exclusion +/- MAZE next WED 3. Stopped IV lasix; 40mg po daily at home 4. Continue ASA 5. Resume eliquis; last dose Sunday leticia 6. Stopped flecainide, losartan 7. Continue metoprolol 8. Added statin 9. Cefdinir through tomorrow  
  
 [x]? High complexity decision making was performed Subjective: HPI: 
No CP Pedal edema/ MARTINEZ improved ROS: no abd pain / n/v, syncope Objective:  
  
Physical Exam: 
 
Last 24hrs VS reviewed since prior progress note. Most recent are: 
 
Visit Vitals /66 (BP 1 Location: Left upper arm, BP Patient Position: Sitting) Pulse 100 Temp 98.9 °F (37.2 °C) Resp 16 Ht 5' 1\" (1.549 m) Wt 99.6 kg (219 lb 9.3 oz) SpO2 96% BMI 41.49 kg/m² Intake/Output Summary (Last 24 hours) at 6/3/2021 8637 Last data filed at 6/3/2021 4464 Gross per 24 hour Intake 1370 ml Output 1185 ml Net 185 ml General: Alert and oriented x3, no acute distress Neck: Supple Respiratory: No respiratory distress, clear lung sound Cardiovascular: Regular rate rhythm, S1S2, DM Abdomen: soft, non tender, non distended Neuro: moves all extremities, oriented x3 Skin: warm and dry Extremity: trace edema, warm to touch Cath access site unremarkable Data Review Telemetry: Afib Recent Labs  
  06/03/21 
0244 06/01/21 
1403 WBC 9.8 9.3 HGB 11.7 11.7 HCT 35.9 36.2  231 No results for input(s): INR, PTP, APTT, INREXT, INREXT in the last 72 hours. Recent Labs  
  06/03/21 
0244 06/01/21 
9747 * 136  
K 4.1 4.1  103 CO2 26 29 BUN 17 19 CREA 0.84 0.85 * 126* CA 9.4 9.3 No results for input(s): CPK, CKNDX, TROIQ in the last 72 hours. No lab exists for component: CPKMB Lab Results Component Value Date/Time Cholesterol, total 129 05/24/2021 10:29 AM  
 HDL Cholesterol 41 05/24/2021 10:29 AM  
 LDL, calculated 68.4 05/24/2021 10:29 AM  
 Triglyceride 98 05/24/2021 10:29 AM  
 CHOL/HDL Ratio 3.1 05/24/2021 10:29 AM  
 
 
No results for input(s): AP, TBIL, TP, ALB, GLOB, GGT, AML, LPSE in the last 72 hours. No lab exists for component: SGOT, GPT, AMYP, HLPSE No results for input(s): PH, PCO2, PO2 in the last 72 hours. Medications Personally Reviewed: 
 
Current Facility-Administered Medications Medication Dose Route Frequency  furosemide (LASIX) tablet 40 mg  40 mg Oral DAILY  cefdinir (OMNICEF) capsule 300 mg  300 mg Oral Q12H  potassium chloride SR (KLOR-CON 10) tablet 20 mEq  20 mEq Oral DAILY  [Held by provider] losartan (COZAAR) tablet 25 mg  25 mg Oral DAILY  sodium chloride (NS) flush 5-40 mL  5-40 mL IntraVENous Q8H  
 sodium chloride (NS) flush 5-40 mL  5-40 mL IntraVENous PRN  
 acetaminophen (TYLENOL) tablet 650 mg  650 mg Oral Q6H PRN Or  
 acetaminophen (TYLENOL) suppository 650 mg  650 mg Rectal Q6H PRN  polyethylene glycol (MIRALAX) packet 17 g  17 g Oral DAILY PRN  promethazine (PHENERGAN) tablet 12.5 mg  12.5 mg Oral Q6H PRN  Or  ondansetron (ZOFRAN) injection 4 mg  4 mg IntraVENous Q6H PRN  
 glimepiride (AMARYL) tablet 2 mg  2 mg Oral DAILY  levothyroxine (SYNTHROID) tablet 112 mcg  112 mcg Oral ACB  metoprolol tartrate (LOPRESSOR) tablet 12.5 mg  12.5 mg Oral BID  pantoprazole (PROTONIX) tablet 40 mg  40 mg Oral ACB  sertraline (ZOLOFT) tablet 50 mg  50 mg Oral DAILY  glucose chewable tablet 16 g  4 Tablet Oral PRN  
 dextrose (D50W) injection syrg 12.5-25 g  25-50 mL IntraVENous PRN  
 glucagon (GLUCAGEN) injection 1 mg  1 mg IntraMUSCular PRN  
 insulin lispro (HUMALOG) injection   SubCUTAneous AC&HS  enoxaparin (LOVENOX) injection 100 mg  1 mg/kg SubCUTAneous Q12H  aspirin chewable tablet 81 mg  81 mg Oral DAILY Tabor Seek III, DO

## 2021-06-03 NOTE — ROUTINE PROCESS
Discharge instructions reviewed with patient. IV removed. Site CDI. Patient discharged via wheelchair to care of .

## 2021-06-03 NOTE — PROGRESS NOTES
\[facility]\"" FLOOR Progress Note Admit Date: 2021 Subjective:  
Pt seen with Dr. Ashley Marti. Afebrile, on RA. Up in chair. No complaints. Ready to go home. Objective:  
 
Visit Vitals /66 (BP 1 Location: Left upper arm, BP Patient Position: Sitting) Pulse 100 Temp 98.9 °F (37.2 °C) Resp 16 Ht 5' 1\" (1.549 m) Wt 219 lb 9.3 oz (99.6 kg) SpO2 96% BMI 41.49 kg/m² Temp (24hrs), Av.3 °F (36.8 °C), Min:98 °F (36.7 °C), Max:98.9 °F (37.2 °C) Last 24hr Input/Output: 
 
Intake/Output Summary (Last 24 hours) at 6/3/2021 0916 Last data filed at 6/3/2021 7023 Gross per 24 hour Intake 1370 ml Output 1185 ml Net 185 ml  
  
 
EKG: NSR Oxygen: RA 
 
CXR:  
CXR Results  (Last 48 hours) 21 1016  XR CHEST PORT Final result Impression:  No Acute Disease. Narrative:  EXAM: Portable CXR. 0940 hours. INDICATION: Preop CABG/MVR  
   
FINDINGS:  
The lungs appear clear. Heart is normal in size. There is no pulmonary edema. There is no evident pneumothorax or pleural effusion. Admission Weight: Last Weight Weight: 225 lb (102.1 kg) Weight: 219 lb 9.3 oz (99.6 kg) EXAM: 
   
Lungs:   Clear to auscultation bilaterally. Heart:  Regular rate and rhythm, S1, S2 normal, no murmur, click, rub or gallop. Abdomen:   Soft, non-tender. Bowel sounds normal. No masses,  No organomegaly. Extremities:  No edema. PPP. Neurologic:  Gross motor and sensory apparatus intact. Activity: up ad cristela Diet:  cardiac Lab Data Reviewed:  
Recent Labs  
  21 
0244 WBC 9.8 HGB 11.7 HCT 35.9  CREA 0.84 Cardiac Testing:  
 
 
Assessment:  
 
Active Problems: 
  Nonrheumatic mitral valve stenosis (2021) Coronary artery disease involving native coronary artery of native heart with angina pectoris ([address] (2021) Mitral stenosis (2021) Plan/Recommendations/Medical Decision Makin. Severe mitral stenosis/Mod to severe MR/Severe TR: Surgical plans per Dr. Bishop West. ANA MARIA completed  
2. 1v CAD:  Surgical plans per team.  On ASA, BB, no statin historically. 3. Severe pulm HTN: 80/40s. Cont diuretics. DC home on PO lasix. 4. Chronic A fib (hx of two DCCV): on eliquis, flecinide, BB PTA. Currently on lovenox for anticoagulation and metoprolol. In NSR this morning. Possible ANA CRISTINA clip/MAZE. DC home on eliquis. Last dose . 
5. DM2: on amaryl. Hold metformin. BS ACHS, SSI Per orders. 6. Hypothyroid: on synthroid 7. Former tobacco smoker: Seen by Pulmonary in , PFTs did not reveal COPD. 8. Breast cancer s/p R mastectomy and XRT: on femura PTA 9. GERD:  On protonix 10. RODOLFO: Had sleep study in 2021, showed RODOLFO, has not had fu appointment for CPAP yet. 11. Chronic hypoxic respiratory failure: On home O2, PFTs completed. Has seen Dr. Paulette Cabrera in the past. On RA now. 12. Possible UTI: Abx started by hospitalist. Will need to clear prior to surgery. Repeat UA at [facility]. 13. Dispo: dc home today. Likely CABG/MVR/TVr/ANA CRISTINA clip, possible MAZE. Plan for surgery .  
 
Signed By: Sammie Lester NP

## 2021-06-03 NOTE — DISCHARGE INSTRUCTIONS
Cardiac Surgery Preop Instructions:    Last dose of eliquis and metformin 6/6/21  Start bactroban and chlorhexidine 6/7/21  Continue all other medications as prescribed. Nothing to eat or drink after midnight the night before surgery. No medications the morning of surgery.

## 2021-06-03 NOTE — DISCHARGE INSTR - DIET
STOP TAKING ELIQUIS  EVENING (THIS WILL BE YOUR LAST DOSE BEFORE SURGERY) [address] [facility], [address]   [phone] [address]    [email or web address] Patient Discharge Instructions Belvie Sever / [id number] : 1955 Admitted 2021 Discharged: 6/3/2021 It is important that you take the medication exactly as they are prescribed. Keep your medication in the bottles provided by the pharmacist and keep a list of the medication names, dosages, and times to be taken in your wallet. Do not take other medications without consulting your doctor. BRING ALL OF YOUR MEDICINES TO YOUR OFFICE VISIT with Eliza Vasquez III, DO . Follow-up with Trenton Short DO in a month. Cardiac Catheterization  Discharge Instructions Transradial Catheterization Discharge Instructions (WRIST) Discharge instructions: Your radial artery in your wrist was used for your cardiac catheterization. This site may be slightly bruised and sore following your procedure. Expect mild tingling or the hand and tenderness at the puncture site for up to 3 days. Excess movement of the wrist used should be avoided for the next 24-48 hours. No lifting over 2 pounds (approximately a ½ gallon of milk) with this arm for 24 hours. Keep the site of the procedure covered with a bandage for 24 hours. You may shower the day after your procedure. Do not take a tub bath or submerge the puncture site in water for 48 hours. No heavy impact activity/lifting > 30 pounds for 1 week. If bleeding of the wrist occurs at home: If the site on your wrist where you had the catheterization procedure begins to bleed, do not panic. Place 1 or 2 fingers over the puncture site and hold pressure to stop the bleeding. You may be able to feel your pulse as you hold pressure. Lift your fingers after 5 minutes to see if the bleeding has stopped.   
Once the bleeding has stopped, gently wipe the wrist area clean and cover with a bandage. If the bleeding from your wrist does not stop after 15 minutes, or if there is a large amount of bleeding or spurting, call 911 immediately (do not drive yourself to the hospital). Other concerns: The site may be slightly bruised and sore following your procedure. Should any of the following occur, contact your physician immediately: Any cool or coldness of the arm, discoloration over a large area, ongoing numbness or any abnormal sensations , moderate to severe pain or swelling in the arm. Redness, soreness, swelling, chills or fever, or colored drainage at the procedure site within 3-7 days after your procedure. If you have any further questions or concerns regarding your procedure please call the Cardiac Cath Lab office at [phone]. During regular business hours ask to speak to Dr. Juan Luis Salinas. During non-business hours the answering service will answer. Ask to speak to the physician on call for [place] [facility].  
 
Transfemoral Catheterization Discharge Instructions (GROIN) Do not drive, operate any machinery, or sign any legal documents for 24 hours after your procedure. You must have someone to drive you home. You may take a shower 24 hours after your cardiac catheterization. Be sure to get the dressing wet and then remove it; gently wash the area with warm soapy water. Pat dry and leave open to air. To help prevent infections, be sure to keep the cath site clean and dry. No lotions, creams, powders, ointments, etc. in the cath site for approximately 1 week. Do not take a tub bath, get in a hot tub or swimming pool for approximately 5 days or until the cath site is completely healed. No strenuous activity or heavy lifting over 10 lbs. for 7 days. Drink plenty of fluids for 24-48 hours after your cath to flush the contrast dye from your kidneys. No alcoholic beverages for 24 hours. You may resume your previous diet (low fat, low cholesterol) after your cath. After your cath, some bruising or discomfort is common during the healing process. Tylenol, 1-2 tablets every 6 hours as needed, is recommended if you experience any discomfort. If you experience any signs or symptoms of infection such as fever, chills, or poorly healing incision, persistent tenderness or swelling in the groin, redness and/or warmth to the touch, numbness, significant tingling or pain at the groin site or affected extremity, rash, drainage from the cath site, or if the leg feels tight or swollen, call your physician right away. If bleeding at the cath site occurs, take a clean gauze pad and apply direct pressure to the groin just above the puncture site. Call 911 immediately, and continue to apply direct pressure until an ambulance gets to your location. You may return to work  2  days after your cardiac cath if no groin bleeding. Information obtained by : 
I understand that if any problems occur once I am at home I am to contact my physician. I understand and acknowledge receipt of the instructions indicated above. R.N.'s Signature                                                                  Date/Time Patient or Representative Signature                                                          Date/Time [facility], [address] [facility] [address] [place] [address], [address] [phone] [place], [address] [facility]    [email or web address] AFTER YOUR TRANSESOPHAGEAL ECHOCARDIOGRAM 
 
Be sure someone else drives you home; do not drive today. You may feel drowsy for several hours.  
 
Do not eat or drink for at least two hours after your procedure. Your throat will be numb and there is a risk you might have difficulty swallowing for a while. Be careful when you do eat or drink for the first time especially with hot fluids since you could easily burn your throat. Call your doctor if: 
 
You are bleeding from your throat or mouth. You have trouble breathing all of a sudden. You have chest pain or any pain that spreads to your neck, jaw, or arms. You have questions or concerns. You have a fever greater than 101°F. 
 
Doctor: Katerine Vogt Special Instructions: 
 
No driving for 24 hours.

## 2021-06-03 NOTE — H&P
CSS 
 History and Physical 
 
Subjective:  
  
Christy Sierra is a 76 y.o. female who was referred for cardiac evaluation of severe mitral stenosis, 1V coronary artery disease and severe pulmonary hypertension, referred by Dr. Franco Luz. Pt's PMH includes recent COVID19 pneumonia, chronic hypoxic respiratory disease on home oxygen 4L, Atrial fib s/p DCCV x 2, hypothyroidism, R breast cancer s/p mastectomy 2015 & XRT, DM2, GERD, HTN, obesity, former tobacco use. Pt presented to [address] [place] [address] today for elective R & L heart cath s/t chief complaint of shortness of breath. Pt reports her breathing acutely changed for the worse in last 3-4 weeks. She is unable to walk minimal distances without getting sigificantly SOB. She denies CP, syncope. She admits to dizziness if she pushes herself during activity, has had LE edema x few months although improved with recent diuretics, + palpitations related to A fib and 2 pillow orthopnea. She notes she had COVID-19 pneumonia in 2021 and while she was SOB for awhile, it had improved after few months to the point she could do all her own ADLs and get around house. She recently started using a walker d/t her SOB. She is former tobacco smoker (quit >5 years ago, 40 year history). She denies alcohol or drug use. Denies family history of heart disease. She is  and lives with her  (who recently had heart surgery about 2 months ago).   
Cardiac Testing 
  
R & L Cardiac catheterization 21:  Severe MS, 1v CAD, elevated L/R heart pressures, severe PHTN, mildly reduced CO/CI Hemodynamics: Ao: 123/54/83 LV: 123/25 
  
RA: 18 
RV: 75/20 PA: 80/20/40 PCWP: 29 TP 
PVR: 2.6 (TD); 3.0 (F) 
CO: 4.2 (TD); 3.7 (F) CI: 2.1 (TD); 1.8 (F) Ao Sat: 92% PA Sat: 62% 
  
MV STUDY:  Mean gradient 16mmHg.  MVA 0.8. 
  
Cors:  
  
Dominance: [x]? ? Right  []? ? Left  []? ? Mixed 
  
LM: Large caliber vessel without significant stenosis 
  
LAD: Large caliber vessel that wraps around the apex with a proximal 60% stenosis D1: Moderate caliber vessel without significant stenosis.   
  
LCX: Moderate caliber vessel without significant stenosis.   
OM1: Small caliber vessel without significant stenosis. OM2: Small caliber vessel without significant stenosis.  
  
RCA: Large caliber dominant vessel without significant stenosis. PDA: Small caliber vessel without significant stenosis. PLB: Small caliber vessel without significant stenosis.   
  
LV angiography:  
EF: 55% Wall motion: no WMA 
MR: mod-sev 
  
Indication for IFR:  Indeterminate stenosis of the LAD. 
  
Technique: 
  
Lesion #1:  LAD 
  
Anticoagulation:  Bivalirudin was used for anticoagulation. Guiding Catheter:  A 6Fr EBU 3.5 guiding catheter was used to engage the Left Main. Guidewire:  A Pressure was used to cross the lesion without difficulty. 
  
The pressure wire was zeroed outside of the body.  The pressure wire was then introduced into the guiding catheter and into the vessel.  The pressure wire was then equalized and then advanced across the indeterminate stenosis.  The final flow rate was 0.79 indicating a hemodynamically significant stenosis. 
  
Post-IFR Angiogram showed PETE 3 flow without dissection or perforation.  The patient tolerated the procedure well without any hemodynamic instability.  
  
ANA MARIA/DCCV 9/15/2020:  
· LV: Estimated LVEF is 50 - 55%. Normal cavity size, wall thickness and systolic function (ejection fraction normal). · LA: Mildly dilated left atrium. No spontaneous echo contrast Left atrial appendage velocity is normal (greater than 40 cm/sec). · MV: Mitral valve thickening. Mitral valve leaflet calcification. Moderate mitral valve regurgitation is present. · TV: Moderate tricuspid valve regurgitation is present. 
   
Cardioversion Findings 
  
Cardioversion Consent: Written consent obtained. Risks and benefits: risks, benefits and alternatives were discussed Consent given by: patient Patient understanding: patient states understanding of procedure being performed Patient consent: patient's understanding of procedure matches consent Procedure consent: procedure consent matches procedure scheduled Relevant documents: relevant documents present and verified Test results: test results available and properly labeled Patient identity confirmed: verbally with patient and arm band Time out: Immediately prior to procedure a \"time out\" was called to verify the correct patient, procedure, equipment, support staff and site/side marked as required. Sedation: 
Patient sedated: yes Sedation type: moderate (conscious) sedation Sedatives: fentanyl and versed Sedation start date/time: 9/15/2020 12:48 PM 
Sedation end date/time: 9/15/2020 1:06 PM 
Vitals: Vital signs were monitored during sedation. Cardioversion basis: elective Pre-procedure rhythm: atrial fibrillation Patient position: patient was placed in a supine position Chest area: chest area exposed Electrodes: pads Location of electrodes: left lateral and right posterior. Number of attempts: 1 Attempt 1 mode: synchronous Attempt 1 shock (in Joules): 360 Attempt 1 outcome: conversion to normal sinus rhythm Post-procedure rhythm: normal sinus rhythm Complications: hypotension Patient tolerance: patient tolerated the procedure well with no immediate complications Echo Findings 
  Left Ventricle Normal cavity size, wall thickness and systolic function (ejection fraction normal). The estimated EF is 50 - 55%. Left Atrium Mildly dilated left atrium. Left atrial appendage is normal. Left atrial appendage size is moderate. There is no thrombus within the left atrial appendage. There is no mass within the left atrial appendage. No spontaneous echo contrast Appendage velocity is normal (greater than 40 cm/sec). Right Ventricle Normal cavity size and global systolic function. Right Atrium Normal cavity size. Interatrial Septum Agitated saline contrast study was performed. No atrial septal defect present. No closure device present. Aortic Valve Normal valve structure and no stenosis. Trace aortic valve regurgitation. Mitral Valve No stenosis. Mitral valve thickening. Leaflet calcification. There is anterior leaflet calcification. There is posterior leaflet calcification. Moderate regurgitation. Tricuspid Valve Normal valve structure and no stenosis. Moderate regurgitation. Pulmonic Valve Pulmonic valve not well visualized. Aorta Normal aortic root, ascending aortic, and aortic arch. Pulmonary Artery Pulmonary arteries not assessed. IVC/Hepatic Veins Inferior vena cava not assessed. Pericardium Insignificant pericardial effusion or fat.  
  
 
 
Past Medical History:  
[facility] 9th 2020  Abscess of chest wall 8/10/2017  Acquired hypothyroidism 2/21/2018  Acute asthmatic bronchitis 8/10/2017  Breast cancer ([facility] 2015 Rt mastectomy  Cancer ([address] right breast  
 Cigarette nicotine dependence in remission 8/22/2017  Diabetes ([facility] [address]  Dyspepsia and other specified disorders of function of stomach  Family history of colon cancer in mother 8/22/2018  GERD (gastroesophageal reflux disease)  GERD without esophagitis 8/22/2017  Hyperglycemia 8/10/2017  Hypertension  Impacted cerumen of right ear 8/10/2017  Mood swings 8/10/2017  Nausea & vomiting  Obesity 8/10/2017  Urticaria 8/10/2017  Vitamin D deficiency 8/22/2018 Past Surgical History:  
Procedure Laterality Date [address]  HX BREAST BIOPSY Right 1/19/2016 RIGHT BREAST DEBRIDEMENT OF MASTECTOMY SITE performed by Eliseo Dixon MD at [facility]  
 tonsillectomy  HX MASTECTOMY Right 12/3/2015  RIGHT BREAST SIMPLE MASTECTOMY WITH RIGHT SENTINEL NODE BIOPSY performed by Eliseo Dixon MD at \[facility]"" AMBULATORY OR  
 Social History Tobacco Use  Smoking status: Former Smoker Packs/day: 1.00 Years: 30.00 Pack years: 30.00 Types: Cigarettes Quit date: 12/3/2015 Years since quittin.5  Smokeless tobacco: Never Used  Tobacco comment: vaped for one year after quitting smoking Substance Use Topics  Alcohol use: No  
  Alcohol/week: 0.0 standard drinks Family History Problem Relation Age of Onset  Cancer Father   
     lung  Osteoporosis Mother  Arthritis-osteo Sister  Cancer Paternal Aunt   
     breast cancer, late 60's-early 66's Prior to Admission medications Medication Sig Start Date End Date Taking? Authorizing Provider  
aspirin 81 mg chewable tablet Take 1 Tablet by mouth daily. 6/3/21   Alyx Valerio III, DO  
cefdinir (OMNICEF) 300 mg capsule Take 1 Capsule by mouth every twelve (12) hours. 6/3/21   Alyx Valerio III, DO  
furosemide (LASIX) 40 mg tablet Take one tablet daily 6/3/21   Lafonda Massed H III, DO  
potassium chloride SR (K-TAB) 20 mEq tablet Take 1 Tablet by mouth daily. 6/3/21   Alyx Valerio III, DO  
mupirocin (BACTROBAN) 2 % ointment by Both Nostrils route two (2) times a day. 21   Pilar Aguilera NP  
chlorhexidine (PERIDEX) 0.12 % solution 15 mL by Swish and Spit route every twelve (12) hours for 14 days. 21  Pilar Aguilera NP  
atorvastatin (LIPITOR) 40 mg tablet Take 1 Tablet by mouth nightly. 6/3/21   Alyx Valerio III, DO  
glimepiride (AMARYL) 2 mg tablet Take 1 Tablet by mouth every morning. 21   Steve Trevino MD  
sertraline (ZOLOFT) 50 mg tablet TAKE 1 TABLET BY MOUTH EVERY DAY 21   Steve Trevino MD  
metFORMIN (GLUCOPHAGE) 500 mg tablet TAKE 1 TAB BY MOUTH TWO (2) TIMES DAILY (WITH MEALS).  21   Steve Trevino MD  
levothyroxine (SYNTHROID) 112 mcg tablet TAKE 1 TABLET BY MOUTH EVERY DAY BEFORE BREAKFAST 21   Steve Trevino MD valsartan-hydroCHLOROthiazide (DIOVAN-HCT) 80-12.5 mg per tablet TAKE 1 TABLET BY MOUTH EVERY DAY 5/20/21 6/3/21  Barak rTevino MD  
metoprolol tartrate (LOPRESSOR) 25 mg tablet Take 0.5 Tabs by mouth two (2) times a day. 10/29/20   Guillermina Valerio III, DO  
apixaban (Eliquis) 5 mg tablet Take 5 mg by mouth two (2) times a day. Colin, MD Sabine  
cholecalciferol, VITAMIN D3, (VITAMIN D3) 5,000 unit tab tablet Take  by mouth daily. Provider, Historical  
Omeprazole delayed release (PRILOSEC D/R) 20 mg tablet Take 20 mg by mouth daily. Provider, Historical  
letrozole [facility]) 2.5 mg tablet  7/23/16 6/3/21  Provider, Historical  
   
Allergies Allergen Reactions  Latex Rash  Antihistamine [Diphenhydramine Hcl] Unknown (comments)  Iodine Rash  Novocain [Procaine] Palpitations Review of Systems: pertinent positives in HPI Consititutional: Denies fever or chills. Eyes:  Denies use of glasses or vision problems(cataracts). ENT:  Denies hearing or swallowing difficulty. CV: Denies CP, claudication, HTN. Resp: Denies dyspnea, productive cough. : Denies dialysis or kidney problems. GI: Denies ulcers, esophageal strictures, liver problems. M/S: Denies joint or bone problems, or implanted artificial hardware. Skin: Denies varicose veins, edema. Neuro: Denies strokes, or TIAs. Psych: Denies anxiety or depression. Endocrine: Denies thyroid problems or diabetes. Heme/Lymphatic: Denies easy bruising or lymphedema. Objective:  
 
VS:  
/66 (BP 1 Location: Left upper arm, BP Patient Position: Sitting) Pulse 100 Temp 98.9 °F (37.2 °C) Resp 16 Ht 5' 1\" (1.549 m) Wt 219 lb 9.3 oz (99.6 kg) SpO2 96% BMI 41.49 kg/m² Physical Exam:   
General appearance: alert, cooperative, no distress Head: normocephalic, without obvious abnormality; atraumatic Eyes: conjunctivae/corneas clear; EOM's intact. Nose: nares normal; no drainage.  
Neck: no carotid bruit and no JVD Lungs: clear to auscultation bilaterally Heart: regular rate and rhythm; no murmur Abdomen: soft, non-tender; bowel sounds normal 
Extremities: moves all extremities; no weakness. Skin: Skin color normal; No varicose veins or edema. Neurologic: Grossly normal   
 
Labs:  
Recent Labs  
  06/03/21 
0552 06/03/21 
0244 WBC  --  9.8 HGB  --  11.7 HCT  --  35.9 PLT  --  236 NA  --  134* K  --  4.1 BUN  --  17  
CREA  --  0.84 GLU  --  119* GLUCPOC 111  --   
 
 
Diagnostics:  
PA and lateral: PA and lateral views demonstrate normal heart size. There is no acute process in 
the lung fields. Degenerative changes are seen in the thoracic spine. 
  
IMPRESSION No acute process or change compared to the prior exam. 
 
Carotid doppler: Right Carotid The right CCA is patent. The right ICA is normal. The right ECA is patent. The right vertebral is antegrade. The right subclavian is normal.  
Left Carotid The left CCA is patent. There is mild stenosis in the left ICA (<50%) and at the proximal segment . The left ICA has mild and calcific plaque. The left ECA is patent. The left vertebral is antegrade. The left subclavian is normal.  
 
 
 
PFTS-FEV1: 1.46L 79% predicted EKG: Sinus bradycardia with premature supraventricular complexes Incomplete right bundle branch block Borderline ECG When compared with ECG of 23-FEB-2021 18:04, Sinus rhythm has replaced Atrial fibrillation Vent. rate has decreased BY  45 BPM  
Incomplete right bundle branch block has replaced Right bundle branch block Confirmed by [place] Dys ([id number]) on 5/29/2021 11:25:49 AM  
 
Assessment:  
 
Active Problems: 
  Nonrheumatic mitral valve stenosis (5/28/2021) Coronary artery disease involving native coronary artery of native heart with angina pectoris ([address] (5/28/2021) Plan: The risk and benefit of surgery were reviewed with patient and family and all questions answered and the patient wishes to proceed. Risk include infection, bleeding, stroke, heart attack, irregular heart rhythm, kidney failure and death. The patient was given instructions and prescriptions for bactroban, peridex. The patient was instructed to stop eliquis and metformin. Surgery is scheduled for 6/9/21. STS Risk Calculator V2.81 - Discussed by surgeon with patient. Procedure: MVR + CAB Risk of Mortality: 
5.619% Renal Failure: 
5.345% Permanent Stroke: 1.344% Prolonged Ventilation: 
19.008% DSW Infection: 
0.456% Reoperation: 2.854% Morbidity or Mortality: 
25.624% Short Length of Stay: 
10.565% Long Length of Stay: 
19.601% Treatment Plan:   
1. Severe mitral stenosis/Mod to severe MR/Severe TR: Surgical plans per Dr. Shyann Barahona. ANA MARIA completed 6/1.   
2. 1v CAD:  Surgical plans per team.  On ASA, BB, no statin historically. 3. Severe pulm HTN: 80/40s. Cont diuretics.  DC home on PO lasix. 4. Chronic A fib (hx of two DCCV): on eliquis, flecinide, and metoprolol. In NSR this morning. Possible ANA CRISTINA clip/MAZE. Rudolph Bailey Last dose of eliquis on 6/6. 
5. DM2: Cont current meds, last dose metformin 6/6 
6. Hypothyroid: on synthroid 7. Former tobacco smoker: Seen by Pulmonary in 2020, PFTs did not reveal COPD. 8. Breast cancer s/p R mastectomy and XRT: on femura PTA 9. GERD:  On protonix 10. RODOLFO: Had sleep study in February 2021, showed RODOLFO, has not had fu appointment for CPAP yet. 11. Chronic hypoxic respiratory failure: On home O2 in past, PFTs completed. Has seen Dr. José Miguel Barnes in the past. On RA 12. Possible UTI: Abx started by hospitalist. Will need to clear prior to surgery. Repeat UA at [facility]. CABG/MVR/TVr/ANA CRISTINA clip, possible MAZE. Plan for surgery 6/9. Signed By: Fatmata Casas NP   
 Tasha 3, 2021

## 2021-06-04 NOTE — PROGRESS NOTES
06/04/21 No transition of care outreach indicated due to patient being managed by [facility] Cardiothoracic Surgery Team for 30 days.

## 2021-06-04 NOTE — TELEPHONE ENCOUNTER
Dr Alexander Contreras office calling to report pt is having heart surgery June 9 at [facility]

## 2021-06-05 NOTE — DISCHARGE SUMMARY
Cardiology Discharge Summary Patient ID: Ruddy Burleson [id number] 
40 y.o. 
1955 Admit Date: 5/28/2021  Discharge Date: 6/5/2021 Admitting Physician: Conrad Parks DO   Discharge Physician: Mellissa Duran III, DO Admission Diagnoses: Chest pain, unspecified type [R07.9] Mitral stenosis [I05.0] Discharge Diagnoses: Active Problems: 
  Nonrheumatic mitral valve stenosis (5/28/2021) Coronary artery disease involving native coronary artery of native heart with angina pectoris ([address] (5/28/2021) Mitral stenosis (5/28/2021) Cardiology Procedures this Admission:  LHC, RHC, ANA MARIA, CT scan Hospital Course:  Admitted after [facility] for valvular heart disease showing 1v CAD (LAD), severe MS, and volume overload. CTS consulted. ANA MARIA performed showing dilated TV Annulus. Diuresed to the point of no longer requiring O2 and felt great. Discharged to come back on 6/9 for CABG/MVR/TVr/ANA CRISTINA exclusion +/- MAZE. Recent Labs  
  06/03/21 
0244 * K 4.1 BUN 17 CREA 0.84 WBC 9.8 HGB 11.7 HCT 35.9  Lab Results Component Value Date/Time Cholesterol, total 129 05/24/2021 10:29 AM  
 HDL Cholesterol 41 05/24/2021 10:29 AM  
 LDL, calculated 68.4 05/24/2021 10:29 AM  
 Triglyceride 98 05/24/2021 10:29 AM  
 
No results for input(s): ALT, AP in the last 72 hours. No lab exists for component: SGOT, GPT No results for input(s): INR, PTP, APTT, INREXT in the last 72 hours. Patient was ambulating without symptoms prior to d/c. Consults: Cardiac Surgery Disposition: home Discharge Condition: [address] Patient Instructions:  
Cannot display discharge medications since this patient is not currently admitted. Referenced discharge instructions provided by nursing for diet and activity. Follow-up:  
Follow-up Information Follow up With Specialties Details Why Contact Info  Gabriel Ko MD Cardiothoracic Surgery On 6/7/2021 Preadmission testing at 1:00pm. Appointment with Dr. Leyla Weiss 6/7/21 at 1:15 pm. [address] [facility] [facility] 
[phone] Guillermo Headley DO Cardiology Schedule an appointment as soon as possible for a visit in 1 month Office will contact you to schedule a follow up appointment. [address] [facility] 
[phone] Risser, Ilah Leyden, MD Family Medicine Schedule an appointment as soon as possible for a visit Please schedule follow up after surgery. [address] [place] [address] 
[phone] 
  
  
 
 
 
> 30 minutes coordinating discharge  Yes Signed: 
Cynthia Hayes III, DO 
6/5/2021 
6:03 PM

## 2021-06-07 NOTE — PERIOP NOTES
[facility] Preoperative Instructions Surgery Date 6/9/2021      Time of Arrival 0530am 
Contact# [phone] 1. On the day of your surgery, please report to the Surgical Services Registration Desk and sign in at your designated time. The Surgery Center is located to the right of the Emergency Room. 2. You must have someone with you to drive you home. You should not drive a car for 24 hours following surgery. Please make arrangements for a friend or family member to stay with you for the first 24 hours after your surgery. 3. Do not have anything to eat or drink (including water, gum, mints, coffee, juice) after midnight  6/8/2021 . ? This may not apply to medications prescribed by your physician. ?(Please note below the special instructions with medications to take the morning of your procedure.) 4. We recommend you do not drink any alcoholic beverages for 24 hours before and after your surgery. 5. Contact your surgeons office for instructions on the following medications: non-steroidal anti-inflammatory drugs (i.e. Advil, Aleve), vitamins, and supplements. (Some surgeons will want you to stop these medications prior to surgery and others may allow you to take them) **If you are currently taking Plavix, Coumadin, Aspirin and/or other blood-thinning agents, contact your surgeon for instructions. ** Your surgeon will partner with the physician prescribing these medications to determine if it is safe to stop or if you need to continue taking. Please do not stop taking these medications without instructions from your surgeon 6. Wear comfortable clothes. Wear glasses instead of contacts. Do not bring any money or jewelry. Please bring picture ID, insurance card, and any prearranged co-payment or hospital payment. Do not wear make-up, particularly mascara the morning of your surgery. Do not wear nail polish, particularly if you are having foot /hand surgery. Wear your hair loose or down, no ponytails, buns, samir pins or clips. All body piercings must be removed. Please shower with antibacterial soap for three consecutive days before and on the morning of surgery, but do not apply any lotions, powders or deodorants after the shower on the day of surgery. Please use a fresh towels after each shower. Please sleep in clean clothes and change bed linens the night before surgery. Please do not shave for 48 hours prior to surgery. Shaving of the face is acceptable. 7. You should understand that if you do not follow these instructions your surgery may be cancelled. If your physical condition changes (I.e. fever, cold or flu) please contact your surgeon as soon as possible. 8. It is important that you be on time. If a situation occurs where you may be late, please call [phone] (OR Holding Area). 9. If you have any questions and or problems, please call [phone] (Pre-admission Testing). 10. Your surgery time may be subject to change. You will receive a phone call the evening prior if your time changes. 11.  If having outpatient surgery, you must have someone to drive you here, stay with you during the duration of your stay, and to drive you home at time of discharge. Special Instructions: Follow all instructions given to you by your doctor. Use your incentive spirometer everyday 20x a day and bring with you to the hospital. 
 
Start 6/7/2021: 
Bactroban (mupirocin) 2% nasal ointment : use as directed - Use the day of surgery and bring with you to the hospital. 
Peridex (chlorhexidine) 0.12% Mouthwash : use as directed - Use the day of surgery and bring with you to the hospital. 
 
Eliquis - stopping 6/7/2021 (needed to stop 6/6/2021) Metformin - stopping 6/7/2021 (needed to stop 6/6/2021) 
 
---NO MEDICATIONS THE MORNING OF SURGERY--- 
 
I understand a pre-operative phone call will be made to verify my surgery time.   In the event that I am not available, I give permission for a message to be left on my answering service and/or with another person? yes 
 
 ___________________      __________   _________ 
  (Signature of Patient)             (Witness)                (Date and Time)

## 2021-06-07 NOTE — PROGRESS NOTES
Anesthesia  Consult note Anesthesia consult requested by the surgeon for: 
suitability of patient for General anesthesia for  minimal  Invasive surgery 
 contraindications for  One lung ventilation Contraindication for ANA MARIA Suitability for invasive lines Subjective:  
  
Patient:  Mario Whiting Procedure: Procedure(s): ON PUMP CABG WITH ENDOSCOPIC VEIN HARVEST,  MITRAL VALVE REPLACEMENT, TRISCUSPID VALVE REPAIR, LEFT ATRIAL APPENDAGE CLIP, POSSIBLE MAZE, DELNIDO Allergies Allergen Reactions  Latex Rash  Antihistamine [Diphenhydramine Hcl] Unknown (comments)  Iodine Rash  
  topical  
 Novocain [Procaine] Palpitations No current facility-administered medications for this encounter. Current Outpatient Medications Medication Sig  
 aspirin 81 mg chewable tablet Take 1 Tablet by mouth daily.  cefdinir (OMNICEF) 300 mg capsule Take 1 Capsule by mouth every twelve (12) hours.  furosemide (LASIX) 40 mg tablet Take one tablet daily  potassium chloride SR (K-TAB) 20 mEq tablet Take 1 Tablet by mouth daily.  mupirocin (BACTROBAN) 2 % ointment by Both Nostrils route two (2) times a day.  chlorhexidine (PERIDEX) 0.12 % solution 15 mL by Swish and Spit route every twelve (12) hours for 14 days.  atorvastatin (LIPITOR) 40 mg tablet Take 1 Tablet by mouth nightly.  glimepiride (AMARYL) 2 mg tablet Take 1 Tablet by mouth every morning.  sertraline (ZOLOFT) 50 mg tablet TAKE 1 TABLET BY MOUTH EVERY DAY  metFORMIN (GLUCOPHAGE) 500 mg tablet TAKE 1 TAB BY MOUTH TWO (2) TIMES DAILY (WITH MEALS).  levothyroxine (SYNTHROID) 112 mcg tablet TAKE 1 TABLET BY MOUTH EVERY DAY BEFORE BREAKFAST  metoprolol tartrate (LOPRESSOR) 25 mg tablet Take 0.5 Tabs by mouth two (2) times a day.  apixaban (Eliquis) 5 mg tablet Take 5 mg by mouth two (2) times a day.  cholecalciferol, VITAMIN D3, (VITAMIN D3) 5,000 unit tab tablet Take  by mouth daily.   
 Omeprazole delayed release (PRILOSEC D/R) 20 mg tablet Take 20 mg by mouth daily. Past Medical History:  
Diagnosis Date [place] [facility] [facility]) 2020  Abscess of chest wall 8/10/2017  Acquired hypothyroidism 2018  Acute asthmatic bronchitis 8/10/2017  Breast cancer ([facility] [address] 2015 Rt mastectomy  Cancer ([facility] [address] right breast  
 Cigarette nicotine dependence in remission 2017  Diabetes ([facility] [address]  Dyspepsia and other specified disorders of function of stomach  Family history of colon cancer in mother 2018  GERD (gastroesophageal reflux disease)  GERD without esophagitis 2017  Hyperglycemia 8/10/2017  Hypertension  Impacted cerumen of right ear 8/10/2017  Mood swings 8/10/2017  Nausea & vomiting  Obesity 8/10/2017  Urticaria 8/10/2017  Vitamin D deficiency 2018 Past Surgical History:  
Procedure Laterality Date [address]  HX BREAST BIOPSY Right 2016 RIGHT BREAST DEBRIDEMENT OF MASTECTOMY SITE performed by Tess Meehan MD at [facility] 371  
 tonsillectomy  HX MASTECTOMY Right 12/3/2015 RIGHT BREAST SIMPLE MASTECTOMY WITH RIGHT SENTINEL NODE BIOPSY performed by Tess Meehan MD at [facility] AMBULATORY OR Social History Tobacco Use  Smoking status: Former Smoker Packs/day: 1.00 Years: 30.00 Pack years: 30.00 Types: Cigarettes Quit date: 12/3/2015 Years since quittin.5  Smokeless tobacco: Never Used  Tobacco comment: vaped for one year after quitting smoking Substance Use Topics  Alcohol use: No  
  Alcohol/week: 0.0 standard drinks Anesthetic Problems: None in the past 
 patient denies any problems with swallowing, esophageal stricture, upper GI bleed, GI surgery. No contraindications for ANA MARIA. Objective:  
 
Physical Exam: 
 
No data found. Temp (24hrs), Av.7 °C (98 °F), Min:36.7 °C (98 °F), Max:36.7 °C (98 °F) Airway Class: 2 Heart-  Regular rate and rhythm,   s1 s2, heard murmer. Lungs- Clear bilateral on ascultation Abd- Soft, non tender no organomegaly Limbs- Normal 
Neuro- Normal 
 
Labs: No results found for this or any previous visit (from the past 24 hour(s)). Assessment:  
 
 
Active Problems: 
  Nonrheumatic mitral valve stenosis (5/28/2021) Coronary artery disease involving native coronary artery of native heart with angina pectoris ([address] (5/28/2021) ASA4 Plan/Recommendations/Medical Decision Making: Anesthetic Plan: GETA with invasive monitoring, post op ventilation and ANA MARIA monitoring Risks and benefits of the anesthetic plan discussed and agreed upon by the patient. no contraindications  For one lung ventilation, large bore lines, ANA MARIA Signed By: Jordana Wilson MD 
Date:           6/7/2021 Time:          1:52 PM 
 
 18

## 2021-06-07 NOTE — PERIOP NOTES
Incentive Hipolito Sagastume Using the incentive spirometer helps expand the small air sacs of your lungs, helps you breathe deeply, and helps improve your lung function. Use your incentive spirometer twice a day (10 breaths each time) prior to surgery. How to Use Your Incentive Spirometer: 1. Hold the incentive spirometer in an upright position. 2. Breathe out as usual.  
3. Place the mouthpiece in your mouth and seal your lips tightly around it. 4. Take a deep breath. Breathe in slowly and as deeply as possible. Keep the blue flow rate guide between the arrows. 5. Hold your breath as long as possible. Then exhale slowly and allow the piston to fall to the bottom of the column. 6. Rest for a few seconds and repeat steps one through five at least 10 times. PAT Tidal Volume________1500______  x_______2_________  Date________6/7/2021________ BRING THE INCENTIVE SPIROMETER WITH YOU TO THE HOSPITAL ON THE DAY OF YOUR SURGERY. Opportunity given to ask and answer questions as well as to observe return demonstration. Patient signature_____________________________    Witness____________________________

## 2021-06-07 NOTE — PERIOP NOTES
Hibiclens/Chlorhexidine Preventing Infections Before and After  Your Surgery IMPORTANT INSTRUCTIONS Please read and follow these instructions carefully. If you are unable to comply with the below instructions your procedure will be cancelled. Every Night for Three (3) nights before your surgery: 1. Shower with an antibacterial soap, such as Dial, or the soap provided at your preassessment appointment. A shower is better than a bath for cleaning your skin. 2. If needed, ask someone to help you reach all areas of your body. Dont forget to clean your belly button with every shower. The night before your surgery: If you lose your Hibiclens/chlorhexidine please contact surgery center or you can purchase it at a local pharmacy 1. On the night before your surgery, shower with an antibacterial soap, such as Dial, or the soap provided at your preassessment appointment. 2. With one packet of Hibiclens/Chlorhexidine in hand, turn water off. 
3. Apply Hibiclens antiseptic skin cleanser with a clean, freshly washed washcloth. ? Gently apply to your body from chin to toes (except the genital area) and especially the area(s) where your incision(s) will be. ? Leave Hibiclens/Chlorhexidine on your skin for at least 20 seconds. CAUTION: If needed, Hibiclens/chlorhexidine may be used to clean the folds of skin of the legs (such as in the area of the groin) and on your buttocks and hips. However, do not use Hibiclens/Chlorhexidine above the neck or in the genital area (your bottom) or put inside any area of your body. 4. Turn the water back on and rinse. 5. Dry gently with a clean, freshly washed towel. 6. After your shower, do not use any powder, deodorant, perfumes or lotion. 7. Use clean, freshly washed towels and washcloths every time you shower. 8. Wear clean, freshly washed pajamas to bed the night before surgery. 9. Sleep on clean, freshly washed sheets.  
10. Do not allow pets to sleep in your bed with you. The Morning of your surgery: 1. Shower again thoroughly with an antibacterial soap, such as Dial or the soap provided at your preassessment appointment. If needed, ask someone for help to reach all areas of your body. Dont forget to clean your belly button! Rinse. 2. Dry gently with a clean, freshly washed towel. 3. After your shower, do not use any powder, deodorant, perfumes or lotion prior to surgery. 4. Put on clean, freshly washed clothing. Tips to help prevent infections after your surgery: 1. Protect your surgical wound from germs: 
? Hand washing is the most important thing you and your caregivers can do to prevent infections. ? Keep your bandage clean and dry! ? Do not touch your surgical wound. 2. Use clean, freshly washed towels and washcloths every time you shower; do not share bath linens with others. 3. Until your surgical wound is healed, wear clothing and sleep on bed linens each day that are clean and freshly washed. 4. Do not allow pets to sleep in your bed with you or touch your surgical wound. 5. Do not smoke  smoking delays wound healing. This may be a good time to stop smoking. 6. If you have diabetes, it is important for you to manage your blood sugar levels properly before your surgery as well as after your surgery. Poorly managed blood sugar levels slow down wound healing and prevent you from healing completely. If you lose your Hibiclens/chlorhexidine, please call the [facility], or it is available for purchase at your pharmacy. ___________________      ___________________      ________________ 
(Signature of Patient)          (Witness)                   (Date and Time)

## 2021-06-09 PROBLEM — Z95.1 S/P CABG X 1: Status: ACTIVE | Noted: 2021-01-01

## 2021-06-09 PROBLEM — Z95.2 S/P MVR (MITRAL VALVE REPLACEMENT): Status: ACTIVE | Noted: 2021-01-01

## 2021-06-09 NOTE — Clinical Note
Lesion: Located in the Distal LAD. Stent inserted. Single technique used. First inflation pressure = 18 arlene; inflation time: 35 sec.

## 2021-06-09 NOTE — Clinical Note
TRANSFER - OUT REPORT:     Verbal report given to: RODRÍGUEZ MEYERS. Report consisted of patient's Situation, Background, Assessment and   Recommendations(SBAR). Opportunity for questions and clarification was provided. Patient transported to: CCU.

## 2021-06-09 NOTE — CONSULTS
ICU DAILY PROGRESS NOTE Summary 75 y/o female POD 0 from  CABG x1, mitral valve repair and LA appendage clip. She arrived in the ICU on dobutamine, epi, amio, precedex, insulin. Post op ANA MARIA reported as decreased EF that improved to 45% with dobutamine and epi. CI in ICU was 2.1 Cardiac cath on 5/28/21:  Severe MS, 1v CAD, elevated L/R heart pressures, severe PHTN, mildly reduced CO/CI 
 
ANA MARIA 6/1/21: EF 60-65%, severely dialted LA, moderatly dilated RA. Mod. Mitral stenosis, mod. Mitral regurg and mod. TR. 
 
PMH: Pt reported to have had Covid 19 pneumonia in February 2021. She also has chronic hypoxic respiratory fialrue on 4L NC at home; afib, hypothyroidism, DM, GERD, obesity (BMI-35) Previous 24 Hours POD 0 from CABG x1 and MVR Hospital Course Hospital Day 0 Critical Care Problems Patient Active Problem List  
Diagnosis Code  Breast cancer, right ([address] C50.911  S/P right mastectomy Z90.11  
 Obesity E66.9  
 Urticaria L50.9  Mood swings R45.86  Abscess of chest wall L02.213  
 Hyperglycemia R73.9  Impacted cerumen of right ear H61.21  
 Acute asthmatic bronchitis J45.909  Diabetes ([address] E11.9  Hypertension I10  
 GERD without esophagitis K21.9  Cigarette nicotine dependence in remission F17.211  Obesity, morbid ([address] E66.01  
 Acquired hypothyroidism E03.9  Vitamin D deficiency E55.9  Family history of colon cancer in mother Z80.0  Nonrheumatic mitral valve stenosis I34.2  Coronary artery disease involving native coronary artery of native heart with angina pectoris ([address] I25.119  
 Mitral stenosis I05.0  S/P CABG x 1 Z95.1  
 S/P MVR (mitral valve replacement) Z95.2 Plan Neuro Postop pain: cont. Prn meds; pt tachy and high risk for afib. Would use fentnyl drip if signs of pain to reduce sympathetic response. CVS 
Hypotension: MAP goal > 65. Cont epi. Monitor SVO2 and keep > 65 CAD: s/p CABGx1; has reduced EF psot op. Continue dobutamine and epi. Pulm Acute postoperative respiratory failure: continue vent support until hemodynamics stabilized. Wean as per postop protocol. GI Keep NPO 
 
-GI prophylaxis with famotidine Renal 
Monitor UOP Repelte lytes as needed. Endo 
BS goal  ID Complete periop ancef Heme Trend H/H; monitor chest tube output. Vitals Visit Vitals /65 (BP 1 Location: Left upper arm, BP Patient Position: At rest) Pulse (!) 124 Temp 98.5 °F (36.9 °C) Resp (!) 32 Ht 5' 1\" (1.549 m) Wt 90.6 kg (199 lb 11.8 oz) SpO2 100% BMI 37.74 kg/m² O2 Flow Rate (L/min): 2 l/min O2 Device: None Temp (24hrs), Av.5 °F (36.9 °C), Min:98.3 °F (36.8 °C), Max:98.7 °F (37.1 °C) Intake/Output:  
 
Intake/Output Summary (Last 24 hours) at 2021 1646 Last data filed at 2021 1630 Gross per 24 hour Intake 2941.6 ml Output 1894 ml Net 1047.6 ml Physical exam: 
 
 
I have examined the patient on this day 2021 and the above documented exam is accurate including the components that have been copied forward LABS AND  DATA: Personally reviewed Recent Labs  
  21 
1523 WBC 20.9* HGB 9.7* HCT 29.1*  
 Recent Labs  
  21 
1523 *  
K 3.7 * CO2 22 BUN 19  
CREA 1.08* * CA 9.2 MG 3.5* Recent Labs  
  21 
1523 AP 41* TP 5.4* ALB 3.1*  
GLOB 2.3 Recent Labs  
  21 
1523 INR 1.3* PTP 13.5* APTT 29.5 Recent Labs  
  21 
1408 PHI 7.38  
PCO2I 36.4 PO2I 82 No results for input(s): CPK, CKMB, TROIQ, BNPP in the last 72 hours. Hemodynamics:  
PAP: PAP Systolic: 49 ( 7870) CO: CO (l/min): 4.6 l/min (21) Wedge:   CI: CI (l/min/m2): 2.4 l/min/m2 (21) CVP:    SVR:    
  PVR:    
 
Ventilator Settings: 
Mode Rate Tidal Volume Pressure FiO2 PEEP  
SIMV   550 ml  10 cm H2O 80 % 5 cm H20 Peak airway pressure: 28 cm H2O   
Minute ventilation: 11.5 l/min MEDS: Reviewed Chest X-Ray: CXR Results  (Last 48 hours) 06/09/21 1534  XR CHEST PORT Final result Impression:  No pneumothorax. There is slight atelectasis lung bases. Narrative:  EXAM:  XR CHEST PORT INDICATION:  postop heart COMPARISON:  2021 FINDINGS: A portable AP radiograph of the chest was obtained at 1517 hours. Patient status post mitral valve replacement and clipping of left atrial  
appendage. ET tube is in satisfactory position. Coxsackie-Hattie catheter tip overlies  
right pulmonary artery. NG tube overlies the stomach. Mediastinal drain and left  
chest tube are noted in position. .  There is no pneumothorax. There is slight  
atelectasis lung bases. There is mild cardiomegaly. .   
   
  
  
 
 
 
 
 
Multidisciplinary Rounds Completed:  N/A 
 
 
DISPOSITION Stay in ICU CRITICAL CARE CONSULTANT NOTE I had a in-person encounter with Washington Wilson, reviewed and interpreted patient data including events, labs, images, vital signs, I/O's, and examined patient. I have discussed the case and the plan and management of the patient's care with the consulting services, the bedside nurses and the respiratory therapist.   
 
NOTE OF PERSONAL INVOLVEMENT IN CARE This patient is at high risk for sudden and clinically significant deterioration, which requires the highest level of preparedness to intervene urgently. I participated in the decision-making and personally managed or directed the management of the following life and organ supporting interventions that required my frequent assessment to treat or prevent imminent deterioration. I personally spent 40 minutes of critical care time. This is time spent at patient's bedside actively involved in patient care as well as the coordination of care and discussions with the patient's family.   This does not include any procedural time which has been billed separately. ------------------------------------------------------------------------------ Ventura Lefort, MD, PhD 
[address] [phone]

## 2021-06-09 NOTE — PROGRESS NOTES
Patient discharged from hospitals after  [facility] for valvular heart disease showing 1v CAD (LAD), severe MS, and volume overload. CTS consulted. ANA MARIA performed showing dilated TV Annulus. Diuresed to the point of no longer requiring O2 and felt great. Discharged to come back on 6/9 for CABG/MVR/TVr/ANA CRISTINA exclusion +/- MAZE. No CCM  outreach indicated due to patient being managed by [address] [place] [address] Cardiothoracic Surgery Team for 30 days.

## 2021-06-09 NOTE — Clinical Note
Multiple inflations used. First inflation pressure = 12 arlene; inflation time: 10 sec. Second inflation pressure: 12 arlene; inflation time: 10 sec. Third inflation pressure: 12 arlene; inflation time: 10 sec. Fourth inflation pressure: 18 arlene; inflation time: 30 sec.  POST DILT

## 2021-06-09 NOTE — Clinical Note
Single view of the aortic root obtained using power injection. Total volume = 45 mL. Rate = 15 mL/sec. Pressure = 900 PSI. Rate of rise = 0 sec.

## 2021-06-09 NOTE — PROGRESS NOTES
0700-Pt's U/A fr 6/7/21 positive, culture pending. Team notified, no additional orders received at this time. Pt tested positive for Covid in February, currently tested NEG, denies sx's or contact. Pt still unable to smell.

## 2021-06-09 NOTE — Clinical Note
Lesion located in the Mid LAD. Balloon inserted. Balloon inflated using multiple inflation technique. Lesion #1: Pressure = 10 arlene; Duration = 10 sec. Inflation 2: Pressure = 10 arlene; Duration = 10 sec. Inflation 3: Pressure = 10 arlene; Duration = 10 sec. Inflation 4: Pressure = 10 arlene; Duration = 10 sec.

## 2021-06-09 NOTE — Clinical Note
Lesion: Located in the Mid LAD. Stent inserted. Stent deployed. Single technique used. First inflation pressure = 14 arlene; inflation time: 10 sec.

## 2021-06-09 NOTE — PERIOP NOTES
0830:  Radiation scaring noted on right underlying breast 
Brusing noted on abdomen from heparin injections Under left breast, large blackhead noted Left hand brusing 
 
0850:  RED Quiros RN aware of start of procedure to update the family 0834:  Updated RED Quiros RN on progression of case 
 
1200:  Rewarming call to CCU 
 
1203:  Updated RED Quiros RN on progression of case 1330:  RED Quiros RN updated on case 1415:  TRANSFER - OUT REPORT: 
 
Verbal report given to Sammi Marcos RN on David Vyas  being transferred to CCU for routine post - op Report consisted of patients Situation, Background, Assessment and  
Recommendations(SBAR). Information from the following report(s) SBAR, ED Summary, OR Summary and Intake/Output was reviewed with the receiving nurse. Lines:  
Double Lumen 06/09/21 Right (Active) Peripheral IV 06/09/21 Left;Posterior Forearm (Active) Site Assessment Clean, dry, & intact 06/09/21 1620 Phlebitis Assessment 0 06/09/21 0721 Infiltration Assessment 0 06/09/21 0721 Dressing Status Clean, dry, & intact 06/09/21 3289 Dressing Type Tape;Transparent 06/09/21 5595 Hub Color/Line Status Green; Infusing 06/09/21 0721 Arterial Line 06/09/21 Right Radial artery (Active) Opportunity for questions and clarification was provided. Patient transported with: 
 Monitor O2 @ 15 liters Registered Nurse Tech CRNA 
PA-C

## 2021-06-09 NOTE — BRIEF OP NOTE
BRIEF OP NOTE Pre-Op Diagnosis: CORNERY ARTERY DISEASE, MITRAL STENOSIS, TRICUSPID Regurgitation, PAF Post-Op Diagnosis: CORNERY ARTERY DISEASE, MITRAL STENOSIS, TRICUSPID Regurgitation, PAF Procedure: ON PUMP CABG x 1, LIMA TO LAD MITRAL VALVE REPLACEMENT 27mm tissue valve LEFT ATRIAL APPENDAGE CLIP Surgeon: Rhina Troy MD 
 
Assistant(s): Chris Peacock PA-C Anesthesia: General  
 
Infusions: Amiodarone, precedex, insulin, , epi Estimated Blood Loss: 
944ml Cell Saver: 
740ml Specimens:  
ID Type Source Tests Collected by Time Destination 1 : mitral valve and leafletts Preservative Mitral Valve  Anay Castillo MD 2021 1204 Pathology Drains and pacing wires: 2 atrial wires, 1 bipolar ventricular wire, 4 nathan drains Complications: None Findings: MS, CAD, PAF. Implants:  
Implant Name Type Inv. Item Serial No.  Lot No. LRB No. Used Action DEVICE OCCL CLP L35MM SM FOOTPRINT 1 HND APPL SUTURELESS W/ - SNA  DEVICE OCCL CLP L35MM SM FOOTPRINT 1 HND APPL SUTURELESS W/ NA [employer]_WD [id number] N/A 1 Implanted VALVE MITRL PERIMT MAGNA 27MM -- PERIMOUNT - [id number]  VALVE MITRL PERIMT MAGNA 27MM -- PERIMOUNT [id number] JIMÉNEZ Mister MarioCIENCES N/A N/A 1 Implanted

## 2021-06-09 NOTE — ANESTHESIA PROCEDURE NOTES
Arterial Line Placement Start time: 6/9/2021 7:10 AM 
End time: 6/9/2021 7:15 AM 
Performed by: Magnolia Machado CRNA Authorized by: Ophelia Arita MD  
 
Pre-Procedure Indications:  Arterial pressure monitoring and blood sampling Preanesthetic Checklist: patient identified, risks and benefits discussed, anesthesia consent, site marked, patient being monitored, timeout performed and patient being monitored Timeout Time: 07:05 EDT Procedure:  
Prep:  ChloraPrep Orientation:  Right Location:  Radial artery Catheter size:  20 G Number of attempts:  1 Assessment:  
Post-procedure:  Line secured and sterile dressing applied Patient Tolerance:  Patient tolerated the procedure well with no immediate complications

## 2021-06-09 NOTE — H&P
Date of Surgery Update: 
Roger Garcia was seen and examined. History and physical has been reviewed. The patient has been examined.  There have been no significant clinical changes since the completion of the originally dated History and Physical. 
 
Signed By: KRISTIN Sandhu   
 June 9, 2021 7:29 AM

## 2021-06-09 NOTE — Clinical Note
Lesion located in the Mid LAD. Balloon inserted. Balloon inflated using multiple inflation technique. Lesion #1: Pressure = 6 arlene; Duration = 10 sec. Inflation 2: Pressure = 8 arlene; Duration = 20 sec.

## 2021-06-09 NOTE — PROGRESS NOTES
Cardiac Surgery Care Coordinator- Called the  of Timothy Mancilla, introduced role of the Cardiac Surgery Coordinator. Reviewed plan of care and day of surgery expectations. Provided family with an update from OR. Encouraged family to verbalize and emotional support given. Will continue to update throughout the day.  Corbin Dubin, RN

## 2021-06-09 NOTE — ANESTHESIA PROCEDURE NOTES
Central Line Placement Start time: 6/9/2021 7:15 AM 
End time: 6/9/2021 7:35 AM 
Performed by: Hector Sanford CRNA Authorized by: Nusrat Sevilla MD  
 
Indications: vascular access, central pressure monitoring and need for vasopressors Preanesthetic Checklist: patient identified, risks and benefits discussed, anesthesia consent, site marked, patient being monitored and timeout performed Timeout Time: 07:05 EDT Pre-procedure: All elements of maximal sterile barrier technique followed? Yes   
2% Chlorhexidine for cutaneous antisepsis, Hand hygiene performed prior to catheter insertion and Ultrasound guidance Sterile Ultrasound Technique followed?: Yes Procedure:  
Prep:  ChloraPrep Location: internal jugular Orientation:  Right Patient position:  Trendelenburg Catheter type:  Double lumen Catheter size:  9 Fr Catheter length:  10 cm Number of attempts:  1 Successful placement: Yes Assessment:  
Post-procedure:  Catheter secured and sterile dressing with CHG applied Assessment:  Blood return through all ports, free fluid flow and guidewire removal verified Insertion:  Uncomplicated Patient tolerance:  Patient tolerated the procedure well with no immediate complications

## 2021-06-09 NOTE — PROGRESS NOTES
1545 - Patient arrived in CCU bed 2554. 
 
1600 - Assessment completed. 1700 - Dr. Deysi Berman at bedside.

## 2021-06-09 NOTE — ANESTHESIA PREPROCEDURE EVALUATION
Relevant Problems No relevant active problems Anesthetic History No history of anesthetic complications PONV Review of Systems / Medical History Patient summary reviewed, nursing notes reviewed and pertinent labs reviewed Pulmonary Within defined limits Asthma Neuro/Psych Within defined limits Cardiovascular Within defined limits Hypertension Valvular problems/murmurs: mitral stenosis Angina CHF: NYHA Classification III Dysrhythmias : atrial fibrillation CAD Exercise tolerance: <4 METS 
  
GI/Hepatic/Renal 
Within defined limits GERD Endo/Other Within defined limits Diabetes Hypothyroidism Obesity and cancer Other Findings Physical Exam 
 
Airway Mallampati: II 
TM Distance: 4 - 6 cm Neck ROM: normal range of motion Mouth opening: Normal 
 
 Cardiovascular Regular rate and rhythm,  S1 and S2 normal,  no murmur, click, rub, or gallop Murmur: Grade 2, Mitral area Dental 
No notable dental hx Pulmonary Breath sounds clear to auscultation Decreased breath sounds Prolonged expiration Abdominal 
GI exam deferred Other Findings Anesthetic Plan ASA: 4 Anesthesia type: general 
 
Monitoring Plan: Arterial line, BIS, CVP, [place] and ANA MARIA Post procedure ventilation Induction: Intravenous Anesthetic plan and risks discussed with: Patient

## 2021-06-09 NOTE — Clinical Note
Impella inserted. Impella inserted over the wire. Impella insertion site: RFA, at the left ventricle. Performance level set at/to = P6. Patient is in cardiogenic shock: Yes. Post procedure Impella status: patient remains on Impella. Post procedure site action: sheaths sewn in.

## 2021-06-09 NOTE — Clinical Note
Lesion: Located in the Proximal LAD. Stent inserted. Stent deployed. First inflation pressure = 12 arlene; inflation time: 15 sec.

## 2021-06-09 NOTE — ANESTHESIA PROCEDURE NOTES
ANA MARIA 
 
 
 
Procedure Details: probe placement, image aquisition & interpretation Site marked Risks and benefits discussed with the patient and plans are to proceed Procedure Note Performed by: Chaka Wall MD 
Authorized by: Chaka Wall MD  
 
 
Indications: assessment of ascending aorta and assessment of surgical repair Modalities: 2D, CF, CWD, contrast, PWD Probe Type: epiaortic and multiplane Insertion: atraumatic Patient Status: intubated Echocardiographic and Doppler Measurements Aorta  Size  Diam(cm)  Dissection PlaqueThick(mm)  Plaque Mobile Ascending normal  No >3 No  
 Arch normal  No >3 No  
 Descending normal  No >3 No  
 
 
 
 Valves  Annulus  Stenosis  Area/Grad  Regurg  Leaflet Morph  Leaflet Motion Aortic normal none  1+ normal normal  
 Mitral dilated moderate  2+ calcified restricted Tricuspid normal none  3+ normal normal  
 
 
 
 Atria  Size  SEC (smoke)  Thrombus  Tumor  Device Rt Atrium dilated No No No No  
 Lt Atrium dilated No No No No  
 
Interatrial Septum Morphology: normal 
 
Interventricular Septum Morphology: normal 
 
Ventricle  Cavity Size  Cavity Dimension Hypertrophy  Thrombus  Gloal FXN  EF  
 RV normal  No no normal   
 LV normal  Yes No normal 60 Regional Function 
(1 = normal, 2 = mildly hypokinetic, 3 = severely hypokinetic, 4 = akinetic, 5 = dyskinetic) LAV - [place] ME LAV = 0  ME LAV = 90  ME LAV = 130 Basal Sept:1 Basal Ant:1 Basal Post:1 Mid Sept:1 Mid Ant:1 Mid Post:1 Apical Sept:1 Apical Ant:1 Basal Ant Sept:1 Basal Lat:1 Basal Inf:1 Mid Lat:1 Mid Inf:1 Apical Lat:1 Apical Inf:1   
 
 
Pericardium: normal 
 
Post Intervention Follow-up Study Ventricular Global Function: decreased Ventricular Regional Function: decreased Valve  Function  Regurgitation  Area Aortic no change Mitral improved 0 Tricuspid no change 3+ Prosthetic normal    
 
Complications: None Comments: Pre ANA MARIA 
LV dilated, ef 60% in presence of 2+ MR, RV dilated. Mitral annulus dialted, leaflets calcified and restricted, mod MS with inflow gradient 5mm. 2 + MR with central jet. NO clot in ANA CRISTINA. 3+ TR with dilated tv, annulus 35 mm. Mild  Some plaque in aorta. Post ANA MARIA Lv function decreased, improved back to ef of 45% on inotropic support. Inflow gradient of mean of 3-5 across the MV. Leaflets moving normally but the peak gradient is very high, 
TR still 3 +. But PA pressures are unchanged, in the range of 47-50 . ANA CRISTINA occluded.

## 2021-06-09 NOTE — Clinical Note
Single view of the internal mammary artery to the left anterior descending obtained using hand injection.

## 2021-06-10 PROBLEM — Z98.890 STATUS POST LIGATION OF LEFT ATRIAL APPENDAGE: Status: ACTIVE | Noted: 2021-01-01

## 2021-06-10 PROBLEM — Z99.89 WALKER AS AMBULATION AID: Chronic | Status: ACTIVE | Noted: 2021-01-01

## 2021-06-10 PROBLEM — T81.11XA POSTOPERATIVE CARDIOGENIC SHOCK (HCC): Status: ACTIVE | Noted: 2021-01-01

## 2021-06-10 PROBLEM — J44.9 COPD, MODERATE (HCC): Chronic | Status: ACTIVE | Noted: 2021-01-01

## 2021-06-10 PROBLEM — D62 POSTOPERATIVE ANEMIA DUE TO ACUTE BLOOD LOSS: Status: ACTIVE | Noted: 2021-01-01

## 2021-06-10 PROBLEM — Z99.81 CHRONIC RESPIRATORY FAILURE WITH HYPOXIA, ON HOME OXYGEN THERAPY (HCC): Status: ACTIVE | Noted: 2021-01-01

## 2021-06-10 PROBLEM — E66.2 MORBID OBESITY WITH ALVEOLAR HYPOVENTILATION (HCC): Chronic | Status: ACTIVE | Noted: 2021-01-01

## 2021-06-10 PROBLEM — I27.20 SEVERE PULMONARY HYPERTENSION (HCC): Chronic | Status: ACTIVE | Noted: 2021-01-01

## 2021-06-10 PROBLEM — J96.11 CHRONIC RESPIRATORY FAILURE WITH HYPOXIA, ON HOME OXYGEN THERAPY (HCC): Status: ACTIVE | Noted: 2021-01-01

## 2021-06-10 PROBLEM — I07.1 MODERATE TRICUSPID REGURGITATION: Chronic | Status: ACTIVE | Noted: 2021-01-01

## 2021-06-10 PROBLEM — N18.2 KIDNEY DISEASE, CHRONIC, STAGE II (GFR 60-89 ML/MIN): Chronic | Status: ACTIVE | Noted: 2021-01-01

## 2021-06-10 NOTE — PROGRESS NOTES
[facility] 
 
ICU Intensivist- Critical Care Progress Note Name: Logan Polanco : 1955 MRN: [id number] Admit: 2021  5:33 AM   
 
Diagnosis:  
 
Principal Problem: 
  Mitral stenosis Problem List: S/P MVR ([id number]) S/P CABG x 1 ([id number]) Postoperative cardiogenic shock Morbid obesity with alveolar hypoventilation Postoperative anemia due to acute blood loss Status post ligation of left atrial appendage ([id number]) Coronary artery disease involving native coronary artery of native heart with angina pectoris Chronic respiratory failure with hypoxia, on home oxygen therapy Morbid obesity with body mass index (BMI) of 40.0 or higher Kidney disease, chronic, stage II (GFR 60-89 ml/min) Former moderate cigarette smoker (10-19 per day) Cigarette nicotine dependence in remission Nonrheumatic mitral valve stenosis Severe pulmonary hypertension Moderate tricuspid regurgitation GERD without esophagitis Walker as ambulation aid Acquired hypothyroidism S/P right mastectomy Vitamin D deficiency Breast cancer, right COPD, moderate Mood swings Hypertension Diabetes ICU Comprehensive Plan of Care:  
 
Plans for this Shift: 1. Postoperative cardiogenic shock-multiple vasoactive drips required overnight (Milrinone @0.375 mcg/Kg/min, Dobutamine @3 mcg/min, Phenylephrine @70 mcg/min). Will add vasopressin to PE @70 mcg/min. 2. Acute on chronic hypoxic respiratory failure- weaning mechanical ventilatory support, potential spontaneous breathing trial later today. 3. Morbid obesity with alveolar hypoventilation, superimposed upon COPD with severe pulmonary hypertension and moderate tricuspid regurgitation- early mobility initiatives strongly encouraged for this walker-dependent, limited ambulater.  
 
 
Subjective:  
 
Progress Note:  
6/10/2021  
10:33 AM  
 
Reason for ICU Admission:  
Severe mitral valve stenosis Overnight Events:  
Postoperative cardiogenic shock necessitated addition of milrinone 20.375 mcg/kg/min, dopamine @4 mcg/min. POD:1 Day Post-Op S/P: Procedure(s): ON PUMP CABG x 1, LIMA TO LAD, MITRAL VALVE REPLACEMENT, LEFT ATRIAL APPENDAGE CLIPANNAMARIE Past Medical History:  
 
 has a past medical history of [facility]) (September 9th 2020), Abscess of chest wall (8/10/2017), Acquired hypothyroidism (2/21/2018), Acute asthmatic bronchitis (8/10/2017), Breast cancer ([address] (2015), Cancer ([address] Chronic respiratory failure with hypoxia, on home oxygen therapy ([address] (5/21/2021), Cigarette nicotine dependence in remission (8/22/2017), COPD, moderate ([address] (6/9/2021), Diabetes ([address] Dyspepsia and other specified disorders of function of stomach, Family history of colon cancer in mother (8/22/2018), Former moderate cigarette smoker (10-19 per day) (1/1/2016), GERD (gastroesophageal reflux disease), GERD without esophagitis (8/22/2017), Hyperglycemia (8/10/2017), Hypertension, Impacted cerumen of right ear (8/10/2017), Kidney disease, chronic, stage II (GFR 60-89 ml/min) (6/10/2021), Moderate tricuspid regurgitation (6/1/2021), Mood swings (8/10/2017), Morbid obesity with alveolar hypoventilation ([address] (6/9/2021), Nausea & vomiting, Obesity (8/10/2017), Postoperative anemia due to acute blood loss (6/10/2021), Postoperative cardiogenic shock ([address] (6/10/2021), Severe pulmonary hypertension ([address] (6/9/2021), Status post ligation of left atrial appendage (6/9/2021), Urticaria (8/10/2017), Vitamin D deficiency (8/22/2018), and Walker as ambulation aid (5/1/2021). Past Surgical History:  
 
 has a past surgical history that includes hx heent (1968); hx appendectomy (1970); hx mastectomy (Right, 12/3/2015); and hx breast biopsy (Right, 1/19/2016). Current Medications:  
 
Current Facility-Administered Medications Medication Dose Route Frequency  milrinone (PRIMACOR) 200 mcg/mL in 0.9% sodium chloride 100 mL infusion  0.25 mcg/kg/min IntraVENous CONTINUOUS  
 vasopressin (VASOSTRICT) 20 Units in 0.9% sodium chloride 100 mL infusion  0-0.1 Units/min IntraVENous TITRATE  arformoteroL (BROVANA) neb solution 15 mcg  15 mcg Nebulization BID RT  
 budesonide (PULMICORT) 250 mcg/2ml nebulizer susp  500 mcg Nebulization BID RT  
 atorvastatin (LIPITOR) tablet 40 mg  40 mg Oral QHS  levothyroxine (SYNTHROID) tablet 112 mcg  112 mcg Oral ACB  sertraline (ZOLOFT) tablet 50 mg  50 mg Oral DAILY  sodium chloride (NS) flush 5-40 mL  5-40 mL IntraVENous Q8H  
 sodium chloride (NS) flush 5-40 mL  5-40 mL IntraVENous PRN  
 0.45% sodium chloride infusion  10 mL/hr IntraVENous CONTINUOUS  
 0.9% sodium chloride infusion  9 mL/hr IntraVENous CONTINUOUS  
 acetaminophen (TYLENOL) tablet 650 mg  650 mg Oral Q4H  
 oxyCODONE IR (ROXICODONE) tablet 5 mg  5 mg Oral Q4H PRN  
 oxyCODONE IR (ROXICODONE) tablet 10 mg  10 mg Oral Q4H PRN  
 morphine injection 4 mg  4 mg IntraVENous Q2H PRN  
 naloxone (NARCAN) injection 0.4 mg  0.4 mg IntraVENous PRN  
 mupirocin (BACTROBAN) 2 % ointment   Both Nostrils BID  ceFAZolin (ANCEF) 2 g in sterile water (preservative free) 20 mL IV syringe  2 g IntraVENous Q6H  
 amiodarone (CORDARONE) tablet 400 mg  400 mg Oral Q12H  
 ondansetron (ZOFRAN) injection 4 mg  4 mg IntraVENous Q4H PRN  
 albuterol (PROVENTIL VENTOLIN) nebulizer solution 2.5 mg  2.5 mg Nebulization Q4H PRN  
 aspirin chewable tablet 81 mg  81 mg Oral DAILY  midazolam (VERSED) injection 1 mg  1 mg IntraVENous Q1H PRN  chlorhexidine (PERIDEX) 0.12 % mouthwash 10 mL  10 mL Oral Q12H  
 famotidine (PEPCID) tablet 20 mg  20 mg Oral Q12H  
 magnesium oxide (MAG-OX) tablet 400 mg  400 mg Oral BID  calcium chloride 1 g in 0.9% sodium chloride 250 mL IVPB  1 g IntraVENous PRN  
 bisacodyL (DULCOLAX) suppository 10 mg  10 mg Rectal DAILY PRN  
 senna-docusate (PERICOLACE) 8.6-50 mg per tablet 1 Tablet  1 Tablet Oral BID  polyethylene glycol (MIRALAX) packet 17 g  17 g Oral DAILY  ELECTROLYTE REPLACEMENT NOTE: Nurse to review Serum Potassium and Magnesuim levels and Initiate Electrolyte Replacement Protocol as needed  1 Each Other PRN  
 magnesium sulfate 1 g/100 ml IVPB (premix or compounded)  1 g IntraVENous PRN  
 glucose chewable tablet 16 g  4 Tablet Oral PRN  
 dextrose (D50W) injection syrg 12.5-25 g  12.5-25 g IntraVENous PRN  
 glucagon (GLUCAGEN) injection 1 mg  1 mg IntraMUSCular PRN  
 insulin lispro (HUMALOG) injection   SubCUTAneous TIDAC  
 [START ON 6/11/2021] insulin lispro (HUMALOG) injection   SubCUTAneous AC&HS  insulin glargine (LANTUS) injection 1-50 Units  1-50 Units SubCUTAneous ONCE PRN  potassium chloride 20 mEq in 50 ml IVPB  20 mEq IntraVENous Q2H PRN  potassium chloride 20 mEq in 50 ml IVPB  20 mEq IntraVENous Q2H PRN  
 heparin (porcine) 1,000 unit/mL injection    PRN  papaverine injection    PRN  propofol (DIPRIVAN) 10 mg/mL infusion  0-50 mcg/kg/min IntraVENous TITRATE  fentaNYL (PF) 1,500 mcg/30 mL (50 mcg/mL) infusion  0-200 mcg/hr IntraVENous TITRATE  DOBUTamine (DOBUTREX) 500 mg/250 mL (2,000 mcg/mL) infusion  0-10 mcg/kg/min (Order-Specific) IntraVENous TITRATE  amiodarone (CORDARONE) 900 mg/250 ml D5W infusion  0.5-1 mg/min IntraVENous TITRATE  insulin regular (NOVOLIN R, HUMULIN R) 100 Units in 0.9% sodium chloride 100 mL infusion  1-10 Units/hr IntraVENous TITRATE  PHENYLephrine (FALLON-SYNEPHRINE) 30 mg in 0.9% sodium chloride 250 mL infusion   mcg/min IntraVENous TITRATE  niCARdipine (CARDENE) 25 mg in 0.9% sodium chloride 250 mL infusion  0-15 mg/hr IntraVENous TITRATE  dexmedeTOMidine in 0.9 % NaCl (PRECEDEX) 400 mcg/100 mL (4 mcg/mL) infusion soln  0.1-1.5 mcg/kg/hr (Order-Specific) IntraVENous TITRATE Allergies/Social/Family History: Allergies Allergen Reactions  Latex Rash  Antihistamine [Diphenhydramine Hcl] Unknown (comments)  Iodine Rash  
  topical  
 Novocain [Procaine] Palpitations Social History Tobacco Use  Smoking status: Former Smoker Packs/day: 1.00 Years: 30.00 Pack years: 30.00 Types: Cigarettes Quit date: 12/3/2015 Years since quittin.5  Smokeless tobacco: Never Used  Tobacco comment: vaped for one year after quitting smoking Substance Use Topics  Alcohol use: No  
  Alcohol/week: 0.0 standard drinks Family History Problem Relation Age of Onset  Cancer Father   
     lung  Osteoporosis Mother  Cancer Mother   
     colon  Arthritis-osteo Sister  Cancer Paternal Aunt   
     breast cancer, late 60's-early 66's Review of Systems:  
 
Review of systems not obtained due to patient factors. Objective:  
Vital Signs: 
Visit Vitals BP (!) 84/48 Pulse (!) 106 Temp 99.6 °F (37.6 °C) Resp 20 Ht 5' 1\" (1.549 m) Wt 94 kg (207 lb 3.7 oz) SpO2 99% BMI 39.16 kg/m² O2 Flow Rate (L/min): 2 l/min O2 Device: Ventilator Temp (24hrs), Av.1 °F (37.3 °C), Min:98.3 °F (36.8 °C), Max:99.8 °F (37.7 °C) CVP (mmHg): 16 mmHg (06/10/21 1000) Intake/Output:  
 
Intake/Output Summary (Last 24 hours) at 6/10/2021 1033 Last data filed at 6/10/2021 1000 Gross per 24 hour Intake 6571.94 ml Output 3319 ml Net 3252.94 ml Physical Exam: 
General:  Sedated and on the ventilator. No acute distress. Eyes:  Sclera anicteric. Pupils equal, round, reactive to light. Mouth/Throat: Orotracheal tube in place. Neck: Supple. Lungs:   Clear to auscultation bilaterally, good effort. Cardiovascular:  Regular rate and rhythm, no murmur, click, rub, or gallop. Abdomen:   Soft, non-tender, bowel sounds normal, non-distended. Extremities: No cyanosis or edema. Skin: No acute rash or lesions.   
Lymph Nodes: Cervical and supraclavicular normal.  
Musculoskeletal:  No swelling or deformity. Lines/Devices:  Intact, no erythema, drainage, or tenderness. Psychiatric: Sedated and appears comfortable on ventilator. LABS AND  DATA: Personally reviewed Recent Labs  
  06/10/21 
0301 06/09/21 
1927 06/09/21 
1523 WBC 11.3*  --  20.9* HGB 8.6* 8.9* 9.7* HCT 25.9* 27.0* 29.1*  
*  --  177 Recent Labs  
  06/10/21 
0301 06/09/21 
1927  146*  
K 4.4 5.2*  
* 112* CO2 25 29 BUN 15 17 CREA 0.86 1.04* GLU 93 104* CA 8.9 9.4 MG 2.6* 2.7* Recent Labs  
  06/10/21 
0301 06/09/21 
1927 AP 23* 29* TP 5.7* 6.1* ALB 3.7 4.0  
GLOB 2.0 2.1 Recent Labs  
  06/09/21 
1523 INR 1.3* PTP 13.5* APTT 29.5 Recent Labs  
  06/07/21 
1408 PHI 7.38  
PCO2I 36.4 PO2I 82 No results for input(s): CPK, CKMB, TROIQ, BNPP in the last 72 hours. Ventilator Settings: 
Mode Rate Tidal Volume Pressure FiO2 PEEP PRVC   350 ml  10 cm H2O 70 % 5 cm H20 Peak airway pressure: 13 cm H2O Minute ventilation: 5.4 l/min MEDS: Reviewed RADIOLOGY: 
XR CHEST PORT Result Date: 6/10/2021 No pneumothorax. Increased left lung haziness, nonspecific. XR CHEST PORT Result Date: 6/9/2021 No pneumothorax. There is slight atelectasis lung bases. Assessment:  
 
Hospital Problems  Date Reviewed: 2/24/2020 Codes Class Noted POA Postoperative cardiogenic shock (HCC) ICD-10-CM: T81.11XA ICD-9-CM: 998.01 Acute 6/10/2021 Yes Postoperative anemia due to acute blood loss ICD-10-CM: D62 
ICD-9-CM: 285.1 Acute 6/10/2021 Yes S/P CABG x 1 ICD-10-CM: Z95.1 ICD-9-CM: V45.81  6/9/2021 No  
 Overview Signed 6/9/2021  2:47 PM by KRISTIN Fischer  
  ON PUMP CABG x 1, LIMA TO LAD MITRAL VALVE REPLACEMENT 27mm tissue valve LEFT ATRIAL APPENDAGE CLIP 
  
  
   
 S/P MVR (mitral valve replacement) ICD-10-CM: Z79.1 ICD-9-CM: V43.3  6/9/2021 No  
 Overview Signed 6/9/2021  2:47 PM by KRISTIN Stallworth  
  ON PUMP CABG x 1, LIMA TO LAD MITRAL VALVE REPLACEMENT 27mm tissue valve LEFT ATRIAL APPENDAGE CLIP Kidney disease, chronic, stage II (GFR 60-89 ml/min) (Chronic) ICD-10-CM: N18.2 ICD-9-CM: 692. 2 End Stage 6/9/2021 No  
   
 COPD, moderate (HCC) (Chronic) ICD-10-CM: J44.9 ICD-9-CM: 496 End Stage 6/9/2021 Yes Severe pulmonary hypertension (HCC) (Chronic) ICD-10-CM: I27.20 ICD-9-CM: 416.8 End Stage 6/9/2021 Yes Moderate tricuspid regurgitation (Chronic) ICD-10-CM: I07.1 ICD-9-CM: 397.0 Chronic 6/1/2021 Yes Status post ligation of left atrial appendage ICD-10-CM: R09.140 ICD-9-CM: V45.89 Acute 6/9/2021 No  
   
 Morbid obesity with alveolar hypoventilation (HCC) (Chronic) ICD-10-CM: Z69.9 ICD-9-CM: 278.03 End Stage 6/9/2021 Yes Chronic respiratory failure with hypoxia, on home oxygen therapy (HCC) ICD-10-CM: J96.11, Z99.81 ICD-9-CM: 518.83, 799.02, V46.2 End Stage 5/21/2021 Yes Morbid obesity with body mass index (BMI) of 40.0 or higher ([facility] ICD-10-CM: E66.01 
ICD-9-CM: 278.01  12/29/2017 Yes Walker as ambulation aid (Chronic) ICD-10-CM: B62.99 ICD-9-CM: V46.8 Chronic 5/1/2021 Yes Former moderate cigarette smoker (10-19 per day) (Chronic) ICD-10-CM: K63.420 ICD-9-CM: V15.82 Health Concern 1/1/2016 Yes Overview Signed 6/10/2021 10:25 AM by Agata Estrdaa MD  
  40 pack-yr, quit 2016 Nonrheumatic mitral valve stenosis ICD-10-CM: I34.2 ICD-9-CM: 424.0  5/28/2021 Yes Coronary artery disease involving native coronary artery of native heart with angina pectoris ([facility] ICD-10-CM: I25.119 ICD-9-CM: 414.01, 413.9  5/28/2021 Yes Mitral stenosis ICD-10-CM: I05.0 ICD-9-CM: 394.0  5/28/2021 No  
   
  
 
Multidisciplinary Rounds Completed: Yes ABCDEF Bundle/Checklist 
Pain Medications: Fentanyl Target RASS: 0 - Alert & Calm - Spontaneously pays attention to caregiver, -1 - Drowsy - Not fully alert, but has sustained awakening to voice and -2 - Light Sedation - Briefly awakens to voice (eyes open & contact <10 sec) Sedation Medications: Propofol and Precedex CAM-ICU:  Negative Discussed Plan of Care (goals of care): Yes Addressed Code Status: Full Code CARDIOVASCULAR Cardiac Gtts: Phenylephrine, Vasopressin, Milrinone and Dobutamine SBP Goal of: > 90 mmHg MAP Goal of: > 65 mmHg Transfusion Trigger (Hgb): <7 g/dL RESPIRATORY Vent Goals:  
Head of bed > 30 degrees Plan to Extubate: tomorrow Aggressive bronchopulmonary hygiene DVT Prophylaxis (if no, list reason): SCD's or Sequential Compression Device SPO2 Goal: > 92% GI/ Pierce Catheter Present: Yes GI Prophylaxis: Protonix (pantoprazole) T/L/D Tubes: ETT and Nasogastric Tube Lines: Peripheral IV, Arterial Line, [employer] and None Drains: Pierce Catheter SPECIAL EQUIPMENT 
PA Catheter and Temporary Pacemaker DISPOSITION Stay in ICU CRITICAL CARE CONSULTANT NOTE I provided a face-to-face bedside physician/patient encounter, greater than the usual and customary amount normally needed, due to the high complexity of medical decision-making required. I reviewed and interpreted patient data including clinical events, labs, images, vital signs, I/O's, and examined patient. I have actively participated in multi-disciplinary discussions (Cardiovascular Surgery, Respiratory Therapy, Radiology, ICU Nursing Staff) regarding the case in formulating an optimal therapeutic plan, and effecting a management strategy for this patient. NOTE OF PERSONAL INVOLVEMENT IN CARE This patient has a high probability of imminent, clinically significant deterioration, which requires the highest level of preparedness to intervene urgently.  I participated in the decision-making and personally managed, or directed the management of, a myriad of life and organ supporting interventions which required my frequent-interval clinical reassessments, in order to treat or prevent imminent deterioration. I personally spent 45 minutes of critical care time. This is time spent at this critically ill patient's bedside actively involved in patient care as well as the coordination of care and discussions with the patient's family. This does not include any procedural time which has been billed separately. Elinor Leone MD, FACS Staff [facility]/ [address] 6/10/2021

## 2021-06-10 NOTE — PROGRESS NOTES
Care Management: 
 
Transition of Care Plan: 
 
RUR:25 Disposition:home with HH. Will go to sons home first than to her own home out in [address] [facility] . Son is Dustin Steiner and his address is: 
[address] [place] [address] [address] [place] [address] . Phone is [phone]. Follow up appointments:PCP and surgeon DME needed:TBD, she has a walker. Transportation at [address] [facility] [place] or means to access home:   IM Medicare letter: Will need second IM letter. Caregiver Contact:  Elba Mccoy Discharge Caregiver contacted prior to discharge? Reason for Admission:   POD # ! CABG. Admitted for planned surgery. RUR Score:  25 PCP: First and Last name:  Dewayne Sánchez MD 
 
 Name of Practice: 
 Are you a current patient: Yes Approximate date of last visit:  
2 weeks ago Can you do a virtual visit with your PCP: YES Resources/supports as identified by patient/family:   family Top Challenges facing patient (as identified by patient/family and CM): Finances/Medication cost?  Has her insurance and not an issue at this time. Transportation? /family Living arrangements? Lives with her  in a split level and once up 7 steps is able to stay on main floor. Self-care/ADL's/Cognition? Alert and oriented and independent at baseline Current Advanced Directive/Advance Care Plan:  Prior Healthcare Decision Maker:  
 Elba Mccoy Payor Source Payor: [place] MEDICARE / Plan: [place] MEDICARE PART A & B / Product Type: Medicare /  
 
                       
Plan for utilizing home health:   Anticipate HH needs and  will call later today with [facility] [place] they would like  . Care Management Interventions PCP Verified by CM: Yes 
Palliative Care Criteria Met (RRAT>21 & CHF Dx)?: No 
Mode of Transport at Discharge:  Other (see comment) () Physical Therapy Consult: Yes Occupational Therapy Consult: Yes Current Support Network: Lives with Spouse (Lives with  in split level home. Has a walker. Independent) Confirm Follow Up Transport: Family Discharge Location Discharge Placement: Home with home health I spoke with  at bedside and able to confirm demographics. Patient lives with her  in a split level home and independent at baseline. She has a walker she uses when she goes out. She drives. At discharge she is anticipated to go stay with her son  Jake Diamond for a short time here in [facility]. His address is [address] [place] [phone].  will call back this afternoon with name of [facility] agency they would like to use, the agency will cover here at [facility] and out in [address] [facility] when she returns home.    
 
Dana Cruz RN AC

## 2021-06-10 NOTE — DIABETES MGMT
Ghazal Martinez Initial Presentation Juanita Sauceda is a 76 y.o. female admitted 6/9/21 for a scheduled cardiac surgery procedure r/t severe mitral stenosis, CABG and severe pulmonary hypertension. LV angiography:  
EF: 55% Wall motion: no WMA 
MR: mod-sev R & L Cardiac catheterization 5/28/21:  Severe MS, 1v CAD, elevated L/R heart pressures, severe PHTN, mildly reduced CO/CI Cors: Dominance: [x]??? Right  []??? Left  []??? Mixed LM: Large caliber vessel without significant stenosis LAD: Large caliber vessel that wraps around the apex with a proximal 60% stenosis D1: Moderate caliber vessel without significant stenosis.   
 
LCX: Moderate caliber vessel without significant stenosis.   
OM1: Small caliber vessel without significant stenosis. OM2: Small caliber vessel without significant stenosis. RCA: Large caliber dominant vessel without significant stenosis. PDA: Small caliber vessel without significant stenosis. PLB: Small caliber vessel without significant stenosis. HX:  
Past Medical History:  
Diagnosis Date Eric Has [facility]) September 9th 2020  Abscess of chest wall 8/10/2017  Acquired hypothyroidism 2/21/2018  Acute asthmatic bronchitis 8/10/2017  Breast cancer ([address] 2015 Rt mastectomy  Cancer ([address] right breast  
 Chronic respiratory failure with hypoxia, on home oxygen therapy ([address] 5/21/2021  Cigarette nicotine dependence in remission 8/22/2017  COPD, moderate ([address] 6/9/2021  Diabetes ([address]  Dyspepsia and other specified disorders of function of stomach  Family history of colon cancer in mother 8/22/2018  Former moderate cigarette smoker (10-19 per day) 1/1/2016  
 40 pack-yr, quit 2016  GERD (gastroesophageal reflux disease)  GERD without esophagitis 8/22/2017  Hyperglycemia 8/10/2017  Hypertension  Impacted cerumen of right ear 8/10/2017  Kidney disease, chronic, stage II (GFR 60-89 ml/min) 6/10/2021  Moderate tricuspid regurgitation 6/1/2021  Mood swings 8/10/2017  Morbid obesity with alveolar hypoventilation ([address] 6/9/2021  Nausea & vomiting  Obesity 8/10/2017  Postoperative anemia due to acute blood loss 6/10/2021  Postoperative cardiogenic shock ([address] 6/10/2021  Severe pulmonary hypertension ([address] 6/9/2021  Status post ligation of left atrial appendage 6/9/2021  Urticaria 8/10/2017  Vitamin D deficiency 8/22/2018 Radha Dessert as ambulation aid 5/1/2021 DX: CAD, mitral stenosis, tricuspid regurgitation, PAF. Type 2 Diabetes. COVID-19 (2/2021). Chronic respiratory failure on 4L NC of home O2. Afib. Obesity. Treatment plan TX: inotrope gtt. amio gtt. Insulin gtt. Pressors. IV ABX. Synthroid. Zoloft. GI prophylaxis. Hospital course Clinical progress has been complicated by necessity to remain intubated over night for stability. S/p ON PUMP CABG x 1, LIMA TO LAD, MITRAL VALVE REPLACEMENT 27mm tissue valve, LEFT ATRIAL APPENDAGE CLIP on 6/9/21   
6/10/21 plan to diurese today, try to wean O2 on the vent and remain intubated, possibly try to extubate tomorrow. Milirone gtt off and starting amio and vaso. Dobutamine gtt to stay. Issues with irregularity in heart rhythm, tachy. BP labile per nursing. Diabetes Patient has known Type 2 diabetes, treated with oral agent PTA. Admission  and A1c 5.7 indicates acceptable diabetes control. Ambulatory blood glucose management provided by primary care provider. Consulted by Provider for advanced diabetes nursing assessment and care, specifically related to  
[x] Pre/post op blood glucose management Diabetes-related medical history Macrovascular disease CAD Diabetes medication history Drug class Currently in use Discontinued Never used Biguanide Metformin 500mg BID    
DDP-4 inhibitor Sulfonylurea Thiazolidinedione GLP-1 RA     
SGLT-2 inhibitors Basal insulin Bolus insulin Fixed Dose  Combinations Subjective Intubated and sedated Objective Physical exam 
General Intubated obese  female lying in the bed. Neuro  Sedated on precedex and propofol gtt. Fentanyl gtt for pain management. Vital Signs Visit Vitals BP (!) 84/48 Pulse (!) 106 Temp 99.6 °F (37.6 °C) Resp 20 Ht 5' 1\" (1.549 m) Wt 94 kg (207 lb 3.7 oz) SpO2 99% BMI 39.16 kg/m² Laboratory Tests Today A1c   
BG 93 Anion gap 4 Serum triglycerides WBC 11.3 Serum creatinine 0.86 GFR >60  ALT 31 Alk phos 23 Factors impacting BG management Factor Dose Comments Nutrition: 
NPO Drugs: 
Vasopressor load Other: Inotrope Lex and vaso gtt Dobutamine gtt Pain Fentanyl gtt or PRN morphine BPS 3 Infection Post op IV ABX WBC improved today. Low grade fever. Other: kidney   GFR 51 on admission ==> now >60 Blood glucose pattern Assessment and Plan Nursing Diagnosis Risk for unstable blood glucose pattern Nursing Intervention Domain 5250 Decision-making Support Nursing Interventions Examined current inpatient diabetes control Explored factors facilitating and impeding inpatient management Explored corrective strategies with responsible inpatient provider Evaluation This female, with Type 2 diabetes, did achieve diabetes control prior to admission, as evidenced by admission BG of 120 and A1c of 5.7. During this hospitalization, the patient has achieved inpatient blood glucose target of 100-180mg/dl. Several factors have played a role in blood glucose management including: 
[x] Critical state of illness r/t having to remain intubated today for stability Patient was started on Glucostabilizer ([address] [place] [address]) on 6/9/21 per protocol for cardiac surgery procedure. On 6/9/21 she used a total of 84 units of insulin.  So far today she has used 45 units of insulin. BG range 80-100s and is in excellent range. Since the patient will remain intubated today for diuresing, weaning O2 and pressor needs then GS will continue per protocol. She likely until she is able to be extubated and take PO will have to remain on GS for longer than usual protocol. Of note, this patient would also benefit from diabetes self-management education and support [facility]. Recommendations Continue on Glucostabilizer given critical presentation at this time Billing Code(s) [] [id number] IP subsequent hospital care - 35 minutes [] 31104 Prolonged Services - 65 minutes [] [id number] Prolonged Services - 110 minutes [x] [id number] IP subsequent hospital care - 25 minutes Before making these care recommendations, I personally reviewed the hospitalization record, including notes, laboratory & diagnostic data and current medications, and  
examined the patient at the bedside (circumstances permitting) before making care recommendations. Total minutes: 25 Deangelo MARINO, RN, ACCNS-AG Diabetes Clinical Nurse Specialist 
Program [facility] Diabetes [facility] Access via [address] [place] [address]

## 2021-06-10 NOTE — CARDIO/PULMONARY
Cardiac Rehab Note: chart review/referral  
 
Consult has been acknowledged CABG/MVR 6/9/21 Smoking history assessed. Patient is a former smoker. Smoking Cessation Program link has not been added to the AVS. Patient on vent at this time. CP Rehab will continue to follow.

## 2021-06-10 NOTE — PROGRESS NOTES
[facility] ICU Progress Note Admit Date: 2021 POD:  1 Day Post-Op Procedure:  Procedure(s): ON PUMP CABG x 1, LIMA TO LAD, MITRAL VALVE REPLACEMENT, LEFT ATRIAL APPENDAGE CLIPANNAMARIE Subjective:  
Pt seen with  [facility]. Pt intubated and sedated. Vent FiO2 70%. On Dobutamine at 3mcg, Milrinone at 0.25mcg, Lex at 75mcg, Prop/Dex/Fent. Objective:  
Vitals: 
Blood pressure (!) 81/52, pulse (!) 111, temperature 98.8 °F (37.1 °C), resp. rate 21, height 5' 1\" (1.549 m), weight 207 lb 3.7 oz (94 kg), SpO2 99 %. Temp (24hrs), Av.1 °F (37.3 °C), Min:98.3 °F (36.8 °C), Max:99.8 °F (37.7 °C) Hemodynamics: 
 CO: CO (l/min): 3.4 l/min CI: CI (l/min/m2): 1.8 l/min/m2 CVP: CVP (mmHg): 14 mmHg (06/10/21 0900) SVR: SVR (dyne*sec)/cm5: 1650 (dyne*sec)/cm5 (06/10/21 0800) PAP Systolic: PAP Systolic: 37 (86/88/74 1858) PAP Diastolic: PAP Diastolic: 23 (68/95/26 1078) PVR:   
 SV02: SVO2 (%): 54 % (06/10/21 0900) SCV02:   
 
EKG/Rhythm:  Sinus tach 100-110s Extubation Date / Time: On ventilator CT Output: 600 mL 24hrs Ventilator: 
Ventilator Volumes Vt Set (ml): 350 ml (06/10/21 0436) Vt Exhaled (Machine Breath) (ml): 394 ml (06/10/21 041) Vt Spont (ml): 215 ml (21 1515) Ve Observed (l/min): 10 l/min (06/10/21 041) Oxygen Therapy: 
Oxygen Therapy O2 Sat (%): 99 % (06/10/21 0900) Pulse via Oximetry: 110 beats per minute (06/10/21 0900) O2 Device: (P) Ventilator (06/10/21 08) O2 Flow Rate (L/min): 2 l/min (21 0755) FIO2 (%): (P) 70 % (06/10/21 08) CXR:  
CXR Results  (Last 48 hours) 06/10/21 0514  XR CHEST PORT Final result Impression:  No pneumothorax. Increased left lung haziness, nonspecific. Narrative:  INDICATION:  postop heart, CABG x1, mitral valve replacement, and left atrial  
appendage clip ligation. EXAM: CXR Portable.   
   
FINDINGS: Portable chest shows support lines/devices show no significant change  
since yesterday. There is no apparent pneumothorax. Lungs show increased left  
haziness, nonspecific. There is difficulty excluding a component of interstitial  
pulmonary edema. Cardiac size is difficult to assess due to patient rotation. 06/09/21 1534  XR CHEST PORT Final result Impression:  No pneumothorax. There is slight atelectasis lung bases. Narrative:  EXAM:  XR CHEST PORT INDICATION:  postop heart COMPARISON:  2021 FINDINGS: A portable AP radiograph of the chest was obtained at 1517 hours. Patient status post mitral valve replacement and clipping of left atrial  
appendage. ET tube is in satisfactory position. [place] catheter tip overlies  
right pulmonary artery. NG tube overlies the stomach. Mediastinal drain and left  
chest tube are noted in position. .  There is no pneumothorax. There is slight  
atelectasis lung bases. There is mild cardiomegaly. .   
   
  
  
 
 
Admission Weight: Last Weight Weight: 199 lb 11.8 oz (90.6 kg) Weight: 207 lb 3.7 oz (94 kg) Intake / Output / Drain: 
Current Shift: 06/10 0701 - 06/10 1900 In: -  
Out: 335 [Urine:225; Drains:110] Last 24 hrs.:  
 
Intake/Output Summary (Last 24 hours) at 6/10/2021 5330 Last data filed at 6/10/2021 0900 Gross per 24 hour Intake 6001. 85 ml Output 3289 ml Net 2712.85 ml EXAM: 
General:     Intubated and sedated Lungs:   Diminished on auscultation bilaterally. Incision:  Covered by dressing. CDI Heart:  Regular rate and rhythm, S1, S2 normal, no murmur, click, rub or gallop. Abdomen:   Soft, non-tender. Bowel sounds hypoactive. No masses,  No organomegaly. Extremities:  No edema. PPP. Neurologic:  Sedated. Responds to stimulus and pain Labs:  
Recent Labs  
  06/10/21 
0855 06/10/21 
0301 06/09/21 
1654 06/09/21 
1523 WBC  --  11.3*  --  20.9* HGB  --  8.6*   < > 9.7* HCT  --  25.9*   < > 29.1*  
PLT  -- 124*  --  177 NA  --  144   < > 146* K  --  4.4   < > 3.7 BUN  --  15   < > 19 CREA  --  0.86   < > 1.08* GLU  --  93   < > 143* GLUCPOC 109  --   --   --   
INR  --   --   --  1.3*  
 < > = values in this interval not displayed. Assessment:  
 
Active Problems: Postoperative cardiogenic shock ([address] (6/10/2021) Postoperative anemia due to acute blood loss (6/10/2021) Kidney disease, chronic, stage II (GFR 60-89 ml/min) (2021) Moderate tricuspid regurgitation (2021) Morbid obesity with body mass index (BMI) of 40.0 or higher ([address] (2017) Nonrheumatic mitral valve stenosis (2021) Coronary artery disease involving native coronary artery of native heart with angina pectoris ([address] (2021) Mitral stenosis (2021) S/P CABG x 1 (2021) Overview: ON PUMP CABG x 1, LIMA TO LAD MITRAL VALVE REPLACEMENT 27mm tissue valve LEFT ATRIAL APPENDAGE CLIP 
 
  S/P MVR (mitral valve replacement) (2021) Overview: ON PUMP CABG x 1, LIMA TO LAD MITRAL VALVE REPLACEMENT 27mm tissue valve LEFT ATRIAL APPENDAGE CLIP Plan/Recommendations/Medical Decision Makin. Severe mitral stenosis/Mod to severe MR/Severe TR s/p MVR 27mm Tissue Valve: Will need Coumdadin x3 mo, on hold for now d/t CT output. On Dobutamine, Milrinone, and Lex gtts. Stop Milrinone and start Vaso for additional BP support. 2. 1v CAD s/p CABG x1 LIMA to LAD:  Cont  ASA and statin. Hold BB d/t on vasopressors 3. Postop cardiogenic shock: postop ANA MARIA 45%. Labile CI/CO. Improving, now off Epi. On Dobutamine and Milrinone. Will stop Milrinone this am. Restart vaso for pressure support. CVP 17. Gentle diuresis. Will add on pBNP - 5,823 4. Severe pulm HTN: 80/40s - diuresed preop. PAS 30-40s postop. Will cont gentle diuresis as tolerated. 5. Chronic A fib (hx of two DCCV) s/p ANA CRISTINA clip: on eliquis, flecinide, and metoprolol PTA.  Currently ST on monitor. Cont PO Amio. Will start IV amio today 6. DM2: A1C 5.7. Cont Insulin gtt per protocol. NPO w meds via OGT 7. Hypothyroid: Cont synthroid 8. Former tobacco smoker: Seen by Pulmonary in 2020, PFTs did not reveal COPD. 9. Breast cancer s/p R mastectomy and XRT: on femura PTA 10. GERD: Cont pepcid 11. RODOLFO: Had sleep study in February 2021, showed RODOLFO, has not had fu appointment for CPAP yet. Likely need bipap at night once extubated 12. Acute on Chronic hypoxic respiratory failure/Atelectasis/Pulmonary Edema: On home O2 in past, has seen Dr. Shaina Metcalf in the past. On Vent  70% FiO2. Will gently diurese today. Start Brovana/Pulmicort nebs today. 13. Possible UTI: Abx started by hospitalist. Repeat UA likely contaminated, no nitrates. Will monitor. 14. Leukocytosis: Pulm toileting. Afebrile at this time. 15. Anemia d/t acute blood loss: preop hgb 11. 7. hgb 8.6 this am. Will check iron profile.  
  
Dispo: Critically Ill in ICU. Wean Vent as able.   
 
Signed By: Daniel Tony NP

## 2021-06-10 NOTE — PROGRESS NOTES
0715- Bedside and Verbal shift change report given to Ewa Louann Mariela (oncoming nurse) by Arun Foster RN (offgoing nurse). Report included the following information SBAR, Kardex, Intake/Output, Recent Results, Cardiac Rhythm Sinus tach and Alarm Parameters . 0800- Shift assessment completed; see flow sheet for details. Pt minimally responsive; remains sedated; PERRLA. Withdrawal from BUE and BLE. Currently in Sinus tach; very frequent PVCs and burst of what look like A-fib with RVR; BP & hemodynamics are very labile. Lungs are diminished throughout; intubated. ABD is semi-soft; BS hypoactive; OGT to suction. Pierce catheter in place. Skin is cool and dry. No indication of discomfort at this time. 56- CTS~IDR being held at the bedside; To start Amio and Vasopressin; wean Milirone down and off. Maintain Doubtamine. Diurese and remain intubated today. 1022- Pt in more of a regular rhythm at this time; however remains tachycardic. BP remains labile. CI dwindling since Milrinone has been off. However ZG~45-448A systolic; UOP WNL following diureses. Will increase Dobutamine to 4mcg and monitor 1205- Reassessment completed; see flow sheet for details. No acute changes in pt assessment. Hemodynamics remain very labile. Repeat labs obtained; awaiting results. Continues to diurese well following AM dose of Lasix 1500- Pt noted with CI~1.2-1.4; SVO2~32-35%; BP stable; systolics~90-100s. PA pressures 36/20s, CVP~13-15; SVR~2000. Attempted to increase Dobutamine, assuming pt need more contractility; however CI continues to drop gradually. SVO2 unchanged. ABG and and mixed venous recalibrated to ensure accuracy. Awaiting results 1515- PaO2~110, SVO2~33%. CTS team aware. 0- Dr. Cristina Caicedo and CTS~NPs at the bedside. Bedside ECHO being performed. Verbal orders to start Epi gtt, MAP goal~65-75. IV Lasix again x1 
 
1610- Reassessment completed; see flow sheet for details. No acute changes in pt's assessment. LL~8.9-6.3; Systolic ZVA~67-930D. All other hemodynamics are stable. Epi started and all other vasoactive gtts double concentrated to help prevent volume overload. 1705- IV Lasix given; Epi increased to 4mcg; CI not responding to Epi at 2mcg. EKG obtained and reviewed with Dr. Cristina Caicedo. Troponin ordered [address] with Dr. Cristina Caicedo; made aware of current condition. Labs place for 1900. Plan for cardiac cath tonight following elevated troponin. [address] and spoke with pt's spouse; verbal consent obtained for cardiac cath and possible impella placement. Spouse already spoke with Dr. Cristina Caicedo and was made aware of procedure and possible impella placement. CHG bath prior to cardiac cath completed 1930- Bedside and Verbal shift change report given to Arun Foster RN (oncoming nurse) by Trent Tang (offgoing nurse). Report included the following information SBAR, Kardex, Intake/Output, Recent Results, Cardiac Rhythm Sinus tach and Alarm Parameters .

## 2021-06-10 NOTE — ANESTHESIA POSTPROCEDURE EVALUATION
Procedure(s): ON PUMP CABG x 1, LIMA TO LAD, MITRAL VALVE REPLACEMENT, LEFT ATRIAL APPENDAGE CLIP, DELNIDO. general 
 
Anesthesia Post Evaluation Patient location during evaluation: PACU Note status: Adequate. Level of consciousness: responsive to verbal stimuli and sleepy but conscious Pain management: satisfactory to patient Airway patency: patent Anesthetic complications: no 
Cardiovascular status: acceptable Respiratory status: acceptable Hydration status: acceptable Comments: +Post-Anesthesia Evaluation and Assessment Patient: Maria Eugenia April MRN: [id number]  SSN: [id number] YOB: 1955  Age: 76 y.o. Sex: female Cardiovascular Function/Vital Signs /64   Pulse (!) 110   Temp 37.8 °C (100.1 °F)   Resp 18   Ht 5' 1\" (1.549 m)   Wt 94 kg (207 lb 3.7 oz)   SpO2 97%   BMI 39.16 kg/m² Patient is status post Procedure(s): ON PUMP CABG x 1, LIMA TO LAD, MITRAL VALVE REPLACEMENT, LEFT ATRIAL APPENDAGE CLIP, DELNIDO. Nausea/Vomiting: Controlled. Postoperative hydration reviewed and adequate. Pain: 
Pain Scale 1: Behavioral Pain Scale (BPS) (06/10/21 0800) Pain Intensity 1: 3 (06/10/21 0800) Managed. Neurological Status:  
Neuro (WDL): Within Defined Limits (06/09/21 0700) At baseline. Mental Status and Level of Consciousness: Arousable. Pulmonary Status:  
O2 Device: Endotracheal tube;Ventilator (06/10/21 1115) Adequate oxygenation and airway patent. Complications related to anesthesia: None Post-anesthesia assessment completed. No concerns. I have evaluated the patient and the patient is stable and ready to be discharged from PACU . Signed By: Carolynn Robb MD  
 6/10/2021 INITIAL Post-op Vital signs:  
Vitals Value Taken Time /64 06/10/21 1200 Temp 37.8 °C (100.1 °F) 06/10/21 1200 Pulse 109 06/10/21 1252 Resp 17 06/10/21 1252 SpO2 98 % 06/10/21 1252 Vitals shown include unvalidated device data.

## 2021-06-10 NOTE — PROGRESS NOTES
20:00  MAP 90. Svr 1400 . CI 1.8-2.0. Slowly weaning Lex. Friction rub ascultated. Heart tones distant. Muffled S1/S2 
 
2011. RT in to place ETT securement device. Patient open eyes and cough. SBP drop to 60.  
2018  Patient continues to stack breaths and not obtaining TV's. RR 30-32. RT [facility] to [facility] mode. ABG ordered for 10pm 
2020 6 bts vtach @150 bpm. Asymptomatic 
2025  Pt appears more comfortable. Facial features relaxed. No longer stacking breaths and meeting TV.  
2100  MAP 80.  RR 24. Facial features slight grimace. Morphine given 21:30  Dr Tammy Prater called for update. Discussed hemodynamics, medications and fluid status in detail. Orders received to start low dose fentanyl PCA. Attempt to wean Dobut to 2 mcg (if able for HR control). If needed and am HGB < 8, may give 1 U PRBC. Aware of UOP and no increase in maintenance fluid at this time. 2200  Manipulating chest tubes to express drainage. Patient eyes wide open, coughing against vent. Nods head yes to pain. SBP 60. SVO2 38. CI 1.7 Increased propofol and precedex. Fentanyl PCA started at 50 mcg. 2210  Appears more sedated. VS improved. Able to bathe and turn without problems. 2215  ABG obtained. Decreased  
 
23:00 Dobutamine decreased to 3 mcg. 
23:44  CI dropped to 1.7. Resumed Dobutamine 4mcg. Vira Libman 0109 SVO2 recalibrated. Old reading 46  New reading 51. ABG obtained . Decreased  and O2 50% 01:15  RR 28. Increased Fentanyl to 75 mcg. Repositioned patient. to left. CI drop to 1.6. SBP 60. SVO2 40. Increased Lex to 75 mcg. Returned patient to supine position 01:45  Hemodynamics very slow to respond. CI drifting towards 1.9 and SVo2 47. 
 
03:00 CI drifts from 1.6-1.9.  BP labile. SVR ranges from 1436-0830. SVO2 44  Titrating lex as tolerated. 04:00 CI drifts to 1.6 and remains lower and SVO2 remaining in lower 40's. Discuss with NP and will add milrinone 0418  ABG noted and discussed with NP . Decreased . Repeat ABG in 1 hour 05:10 Milrinone . 125 mcg started. CI 1.6. SVO2 39.   
05:30 ABG noted increase o2 to 70% per NP 
0545  CI 1.9  SVO2 50. Increased Milrinone . 250 mcg per NP. CaCl 1 Gm given for ionized Ca 1.06.   
 
06:00   4 mcg. Mil .250 mcg  Lex 75 mcg. Telemetry -116. Freq PVC's. Prop/Dex and Fentanyl for sedation and RR remains 25-28.  cc/ 12 hr.   cc/ 12 hr.  Weight up 4 kg. Hemodynamics remain very labile and extremely sensitive to any external stimulus (turning, coughing etc). 06:30  With increased milrinone CI 2.4 SVO2 58 . Attempted to wean Dobutamine to 3 mcg but CI drop 1.8 and SVO2 to 50 within 15 minutes of titration.

## 2021-06-10 NOTE — PROGRESS NOTES
PT note:  
 
Orders received and acknowledged. Chart reviewed and spoke with nursing. Patient currently intubated and sedated, s/p CABG x 1 and MVR. Will defer PT evaluation this date due to medically instability and follow up tomorrow.   
 
Myke Soto, PT, DPT

## 2021-06-10 NOTE — PROGRESS NOTES
Occupational Therapy OT orders received and acknowledged. Chart reviewed and spoke with nursing. Patient currently intubated and sedated, s/p CABG x 1 and MVR. Will defer OT evaluation this date due to medically instability and follow up tomorrow.   
 
Negro Matson, OTR/L

## 2021-06-10 NOTE — PROGRESS NOTES
1513 patient arrived from OR, placed on monitor. 1600 Dual skin with James Chahal RN 
0627 Patient waking up, nodding head appropriately, vagal by BP to SBP 40s,  Rapid titration of Dobut, Neao and epi. DR Abdi Woo aware. 1740 BP tanikning again. Pestarted epi at one, vasopressin at .04. CI 1.6. 
1747 CI still decreasing to 1.3 Dr Abdi Woo aware and at bedside. Webster waveform dampened with narrow pulse pressure. Webster repositioned by Dr Dayanna Mckenzie. Waveform now with diacrotic notch. Very narrow pulse pressure. Both Dr Abdi Woo and Dr Dayanna Mckenzie aware. 1900 Report to Misty Moeller RN. Patient continues to be extremely labile, unable to turn, r/t to patient's hemodynamics at this time.

## 2021-06-10 NOTE — OP NOTES
[address] 
OPERATIVE REPORT Name:  Neto Enciso 
MR#:  [id number] :  1955 ACCOUNT #:  [de-identified] DATE OF SERVICE:  2021 PREOPERATIVE DIAGNOSES: 
1. Severe mitral stenosis. 2.  Severe mitral regurgitation. 3.  Long standing persistent atrial fibrillation status post cardioversion. 4.  Obesity with height of 5 feet 1, and weight of 199 pounds, body mass index of 37.7. 5.  History of COVID-19 pneumonia. 6.  Hypertension. 7.  Diabetes mellitus x2. 
8.  Right-sided breast cancer with mastectomy and history of chemoradiation. 9.  Severe pulmonary hypertension. POSTOPERATIVE DIAGNOSES: 
1. Severe mitral stenosis. Ulus Reusing 2.  Severe mitral regurgitation. 3.  Long standing persistent atrial fibrillation status post cardioversion. 4.  Obesity with height of 5 feet 1, and weighing 199 pounds, body mass index 37.7. 5.  History of COVID-19 pneumonia. 6.  Hypertension. 7.  Diabetes mellitus x2. 
8.  Right-sided breast cancer with mastectomy and history of chemoradiation. 9.  Severe pulmonary hypertension. PROCEDURES PERFORMED: 
1. Mitral valve replacement with #27 Garces bioprosthetic valve. 2.  Left internal mammary artery to LAD anastomosis. 3.  Left atrial appendage clip ligation with #35 AtriCure clip (modifier [id number]). 4.  Epiaortic ultrasound. SURGEON:  Amparo Otoole MD 
 
ASSISTANT:  Pola Santana PA-C. The assistance of a PA was required due to the critical nature of the surgery and the experience of the PA. ANESTHESIA:  General endotracheal. 
 
ANESTHESIOLOGIST:  Bibi Salazar MD 
 
COMPLICATIONS:  None. SPECIMENS REMOVED:  None. IMPLANTS:  As above. ESTIMATED BLOOD LOSS:  500 mL. DETAILS:  The patient is a 67-year-old woman with a history of longstanding severe MR and severe MS due to rheumatic disease. She was found on preoperative catheterization to have a IFR positive proximal LAD lesion.   She was referred for mitral valve replacement and coronary bypass grafting. She was prepped and draped in a sterile fashion. A time-out was performed. An incision was made over the sternum. Median sternotomy was performed. The left hemisternum was elevated. The left internal mammary artery was dissected free from the left chest wall. Heparin was administered for cardiopulmonary bypass. A sternal retractor was placed and spread. The pericardium was opened widely and laterally. Pericardial stay sutures were placed. The epiaortic ultrasound and a finger were used to evaluate the ascending aorta, it was free of disease. We proceeded with aortic and bicaval atrial cannulation. We then placed an antegrade as well as retrograde cardioplegia catheters. When we had a therapeutic ACT, we went on cardiopulmonary bypass. I administered antegrade as well as retrograde cardioplegia, had a prompt diastolic arrest. 
 
I then sized the left atrial appendage for a size 35 clip. The left side of the veins were way too posterior to safely get around and perform the left-sided PVI, so we aborted this. She was also in sinus rhythm at the time of the surgery. We next turned our attention to the LIMA to the LAD, we performed our anastomosis with no issues. The coronary artery was extremely friable. We then turned our attention to the left atriotomy, we performed this, we then opened up the atrium. We used the atrial retractor to expose the mitral valve. The posterior leaflet was heavily calcified at the tip. The posterior annulus and anterior annulus, however, were free of heavy calcium. We placed our annular sutures circumferentially. We then resected the tip of the posterior leaflet and the anterior leaflet. We incised for a size 27 valve. We then seated it, passed the sutures through the valve sewing ring and seated this in place. We then used a Cor-Knot sutures to seat the valve in place. We then closed the atriotomy in multiple layers.   We were satisfied with hemostasis here. We then placed atrial and ventricular pacing wires. We gave a retrograde hot shot. We removed the aortic cross clamp. We weaned from cardiopulmonary bypass with low dose ionotropic support. We then administered protamine. We decannulated. We dried up our surgical sites. We placed 8 stainless wires and closed the soft tissue in multiple layers. The patient was then safely transported to the CCU. Tania Villa MD 
 
 
[id number] 
D:  06/09/2021 15:55 
T:  06/10/2021 0:51 JOB #:  [id number]

## 2021-06-10 NOTE — PROGRESS NOTES
Interdisciplinary team rounds were held [phone] with the following team members:Care Management, Diabetes Treatment Specialist, Nursing, Nutrition, Pharmacy, Physical Therapy and Physician Adjust medications, continue to monitor and support. Plan of care discussed. Goal: See MD orders and progress notes for further  interventions and desired outcomes.

## 2021-06-11 VITALS — HEART RATE: 122 BPM | RESPIRATION RATE: 24 BRPM | OXYGEN SATURATION: 98 %

## 2021-06-11 NOTE — PROGRESS NOTES
She did arrest ,with PEA . Code Blue called and full code worked with out restoring pressure. Code stopped. Patient .

## 2021-06-11 NOTE — PROGRESS NOTES
Patient attended in ICU . She remains in Cardiogenic shock , on high dose multi pressors. PA 40 / 20 BP requires multiple repeat boluses of EPI to support. Doesn't last long . I have reviewed with Dr Deysi Berman  Here I called her  and notified him of completion of the cath / PCI procedure and the fact that she remains in shock and stands a chance of not recovering here and may not survive tonight .      At this time he is at home , not coming to the hospital

## 2021-06-11 NOTE — PROGRESS NOTES
2226-Dr. Anjana Sung and NP Linus Driver at the bedside. Impella at P5. Unable to increase due to suction alarms. Impella Rep Oscar RN at bedside as well. Below Medications per Dr. Anjana Sung. 2226-Epi 1 mg 
2227- 1 amp sodium Bicarb 2237- Epi 1 mg 
2242- Epi 1 mg 
2243- Labs drawn and sent 2250- EPi 1 mg 
2253- Epi 1 mg 
2253- Epi 1 mg 
2259- Epi 1 mg- PEA CPR initiated. Dr. Nancy Montilla in route. Please see code [facility] [place] documentation for medications and interventions. 2309- Code blue efforts terminated. Dr. Nancy Montilla to call family.

## 2021-06-11 NOTE — DISCHARGE SUMMARY
[facility] Discharge Summary Patient ID: 
Moose Mack [id number] 
71 y.o. 
1955 Admit date: 6/9/2021 Discharge date: 6/11/2021 Admitting Physician: Thai Jugde MD  
 
Referring Cardiologist:  Ni Choi PCP:  Kareen Trevino MD 
 
Admitting Diagnoses: MR 
 
Discharge Diagnoses:  
 
Hospital Problems  Date Reviewed: 2/24/2020 Codes Class Noted POA Postoperative cardiogenic shock (HCC) ICD-10-CM: T81.11XA ICD-9-CM: 998.01 Acute 6/10/2021 Yes Postoperative anemia due to acute blood loss ICD-10-CM: D62 
ICD-9-CM: 285.1 Acute 6/10/2021 Yes Kidney disease, chronic, stage II (GFR 60-89 ml/min) (Chronic) ICD-10-CM: N18.2 ICD-9-CM: 151. 2 End Stage 6/9/2021 No  
   
 COPD, moderate (HCC) (Chronic) ICD-10-CM: J44.9 ICD-9-CM: 496 End Stage 6/9/2021 Yes Severe pulmonary hypertension (HCC) (Chronic) ICD-10-CM: I27.20 ICD-9-CM: 416.8 End Stage 6/9/2021 Yes Moderate tricuspid regurgitation (Chronic) ICD-10-CM: I07.1 ICD-9-CM: 397.0 Chronic 6/1/2021 Yes Status post ligation of left atrial appendage ICD-10-CM: T42.363 ICD-9-CM: V45.89 Acute 6/9/2021 No  
   
 Morbid obesity with alveolar hypoventilation (HCC) (Chronic) ICD-10-CM: Y20.5 ICD-9-CM: 278.03 End Stage 6/9/2021 Yes Chronic respiratory failure with hypoxia, on home oxygen therapy (HCC) ICD-10-CM: J96.11, Z99.81 ICD-9-CM: 518.83, 799.02, V46.2 End Stage 5/21/2021 Yes Walker as ambulation aid (Chronic) ICD-10-CM: S84.19 ICD-9-CM: V46.8 Chronic 5/1/2021 Yes Former moderate cigarette smoker (10-19 per day) (Chronic) ICD-10-CM: B72.059 ICD-9-CM: V15.82 Health Concern 1/1/2016 Yes Overview Signed 6/10/2021 10:25 AM by Renee Soto MD  
  40 pack-yr, quit 2016 S/P CABG x 1 ICD-10-CM: Z95.1 ICD-9-CM: V45.81  6/9/2021 No  
 Overview Signed 6/9/2021  2:47 PM by KRISTIN Richards  
  ON PUMP CABG x 1, LIMA TO LAD MITRAL VALVE REPLACEMENT 27mm tissue valve LEFT ATRIAL APPENDAGE CLIP 
  
  
   
 S/P MVR (mitral valve replacement) ICD-10-CM: R52.1 ICD-9-CM: V43.3  2021 No  
 Overview Signed 2021  2:47 PM by KRISTIN Galindo  
  ON PUMP CABG x 1, LIMA TO LAD MITRAL VALVE REPLACEMENT 27mm tissue valve LEFT ATRIAL APPENDAGE CLIP Nonrheumatic mitral valve stenosis ICD-10-CM: I34.2 ICD-9-CM: 424.0  2021 Yes Coronary artery disease involving native coronary artery of native heart with angina pectoris ([facility].) ICD-10-CM: I25.119 ICD-9-CM: 414.01, 413.9  2021 Yes * (Principal) Severe mitral valve stenosis ICD-10-CM: I05.0 ICD-9-CM: 394.0  2021 Unknown Morbid obesity with body mass index (BMI) of 40.0 or higher ([facility] ICD-10-CM: E66.01 
ICD-9-CM: 278.01  2017 Yes Discharged Condition:  Disposition:  Procedures for this admission:  Procedure(s): LEFT HEART CATH / CORONARY ANGIOGRAPHY Impella Insertion Aortagram Abdominal 
Coronary Angiography Angioplasty Coronary Percutaneous Coronary Intervention Insert Stent Eduardo Coronary Discharge Medications: My Medications STOP taking these medications   
cefdinir 300 mg capsule Commonly known as: OMNICEF ASK your doctor about these medications Instructions Each Dose to Equal Morning Noon Evening Bedtime  
aspirin 81 mg chewable tablet Your last dose was: Your next dose is: Take 1 Tablet by mouth daily. 81 mg 
  
  
  
  
  
atorvastatin 40 mg tablet Commonly known as: LIPITOR Your last dose was: Your next dose is: Take 1 Tablet by mouth nightly. 40 mg 
  
  
  
  
  
chlorhexidine 0.12 % solution Commonly known as: PERIDEX Your last dose was: Your next dose is:  
 
  
 15 mL by Swish and Spit route every twelve (12) hours for 14 days. 15 mL 
  
  
  
  
  
cholecalciferol (5000 Units/125 mcg) Tab tablet Commonly known as: VITAMIN D3 Your last dose was: Your next dose is: Take  by mouth daily. Eliquis 5 mg tablet Generic drug: apixaban Your last dose was: Your next dose is: Take 5 mg by mouth two (2) times a day. 5 mg 
  
  
  
  
  
furosemide 40 mg tablet Commonly known as: LASIX Your last dose was: Your next dose is: Take one tablet daily 
   
  
  
  
  
glimepiride 2 mg tablet Commonly known as: AMARYL Your last dose was: Your next dose is: Take 1 Tablet by mouth every morning. 2 mg 
  
  
  
  
  
levothyroxine 112 mcg tablet Commonly known as: SYNTHROID Your last dose was: Your next dose is: TAKE 1 TABLET BY MOUTH EVERY DAY BEFORE BREAKFAST 
   
  
  
  
  
metFORMIN 500 mg tablet Commonly known as: GLUCOPHAGE Your last dose was: Your next dose is: TAKE 1 TAB BY MOUTH TWO (2) TIMES DAILY (WITH MEALS). metoprolol tartrate 25 mg tablet Commonly known as: LOPRESSOR Your last dose was: Your next dose is: Take 0.5 Tabs by mouth two (2) times a day. 12.5 mg 
  
  
  
  
  
mupirocin 2 % ointment Commonly known as: [employer] Your last dose was: Your next dose is:  
 
  
 by Both Nostrils route two (2) times a day. Omeprazole delayed release 20 mg tablet Commonly known as: PRILOSEC D/R Your last dose was: Your next dose is: Take 20 mg by mouth daily. 20 mg 
  
  
  
  
  
potassium chloride SR 20 mEq tablet Commonly known as: K-TAB Your last dose was: Your next dose is: Take 1 Tablet by mouth daily. 20 mEq 
  
  
  
  
  
sertraline 50 mg tablet Commonly known as: ZOLOFT Your last dose was: Your next dose is:  TAKE 1 TABLET BY MOUTH EVERY DAY 
   
  
  
  
  
  
 
 
HPI: Cameron Diane a 76 y.o. female who was referred for cardiac evaluation of severe mitral stenosis, 1V coronary artery disease and severe pulmonary hypertension, referred by  Iman.  Pt's PMH includes recent COVID19 pneumonia, chronic hypoxic respiratory disease on home oxygen 4L, Atrial fib s/p DCCV x 2, hypothyroidism, R breast cancer s/p mastectomy 2015 & XRT, DM2, GERD, HTN, obesity, former tobacco use.   Pt presented to [facility] today for elective R & L heart cath s/t chief complaint of shortness of breath.  Pt reports her breathing acutely changed for the worse in last 3-4 weeks.  She is unable to walk minimal distances without getting sigificantly SOB.  She denies CP, syncope.  She admits to dizziness if she pushes herself during activity, has had LE edema x few months although improved with recent diuretics, + palpitations related to A fib and 2 pillow orthopnea.   She notes she had COVID-19 pneumonia in 2021 and while she was SOB for awhile, it had improved after few months to the point she could do all her own ADLs and get around house. Nilson Ireland recently started using a walker d/t her SOB. Nilson Ireland is former tobacco smoker (quit >5 years ago, 40 year history).  She denies alcohol or drug use. Denies family history of heart disease. Nilson Ireland is  and lives with her  (who recently had heart surgery about 2 months ago).   
Cardiac Testing 
  
R & L Cardiac catheterization 21:  Severe MS, 1v CAD, elevated L/R heart pressures, severe PHTN, mildly reduced CO/CI Hemodynamics: Ao: 123/54/83 LV: 123/25 
  
RA: 18 
RV: 75/20 PA: 80/20/40 PCWP: 29 TP 
PVR: 2.6 (TD); 3.0 (F) 
CO: 4.2 (TD); 3.7 (F) CI: 2.1 (TD); 1.8 (F) Ao Sat: 92% PA Sat: 62% 
  
MV STUDY:  Mean gradient 16mmHg.  MVA 0.8. 
  
Cors:  
  
Dominance: [x]??? Right  []??? Left  []??? Mixed 
  
LM: Large caliber vessel without significant stenosis 
  
LAD: Large caliber vessel that wraps around the apex with a proximal 60% stenosis D1: Moderate caliber vessel without significant stenosis.    
  
LCX: Moderate caliber vessel without significant stenosis.   
OM1: Small caliber vessel without significant stenosis. OM2: Small caliber vessel without significant stenosis.  
  
RCA: Large caliber dominant vessel without significant stenosis. PDA: Small caliber vessel without significant stenosis. PLB: Small caliber vessel without significant stenosis.   
  
LV angiography:  
EF: 55% Wall motion: no WMA 
MR: mod-sev 
  
Indication for IFR:  Indeterminate stenosis of the LAD. 
  
Technique: 
  
Lesion #1:  LAD 
  
Anticoagulation:  Bivalirudin was used for anticoagulation. Guiding Catheter:  A 6Fr EBU 3.5 guiding catheter was used to engage the Left Main. Guidewire:  A Pressure was used to cross the lesion without difficulty. 
  
The pressure wire was zeroed outside of the body.  The pressure wire was then introduced into the guiding catheter and into the vessel.  The pressure wire was then equalized and then advanced across the indeterminate stenosis.  The final flow rate was 0.79 indicating a hemodynamically significant stenosis. 
  
Post-IFR Angiogram showed PETE 3 flow without dissection or perforation.  The patient tolerated the procedure well without any hemodynamic instability.  
  
ANA MARIA/DCCV 9/15/2020:  
· LV: Estimated LVEF is 50 - 55%. Normal cavity size, wall thickness and systolic function (ejection fraction normal). · LA: Mildly dilated left atrium. No spontaneous echo contrast Left atrial appendage velocity is normal (greater than 40 cm/sec). · MV: Mitral valve thickening. Mitral valve leaflet calcification. Moderate mitral valve regurgitation is present. · TV: Moderate tricuspid valve regurgitation is present. 
   
Cardioversion Findings 
  
Cardioversion Consent: Written consent obtained. Risks and benefits: risks, benefits and alternatives were discussed Consent given by: patient Patient understanding: patient states understanding of procedure being performed Patient consent: patient's understanding of procedure matches consent Procedure consent: procedure consent matches procedure scheduled Relevant documents: relevant documents present and verified Test results: test results available and properly labeled Patient identity confirmed: verbally with patient and arm band Time out: Immediately prior to procedure a \"time out\" was called to verify the correct patient, procedure, equipment, support staff and site/side marked as required. Sedation: 
Patient sedated: yes Sedation type: moderate (conscious) sedation Sedatives: fentanyl and versed Sedation start date/time: 9/15/2020 12:48 PM 
Sedation end date/time: 9/15/2020 1:06 PM 
Vitals: Vital signs were monitored during sedation. Cardioversion basis: elective Pre-procedure rhythm: atrial fibrillation Patient position: patient was placed in a supine position Chest area: chest area exposed Electrodes: pads Location of electrodes: left lateral and right posterior. Number of attempts: 1 Attempt 1 mode: synchronous Attempt 1 shock (in Joules): 360 Attempt 1 outcome: conversion to normal sinus rhythm Post-procedure rhythm: normal sinus rhythm Complications: hypotension Patient tolerance: patient tolerated the procedure well with no immediate complications Echo Findings 
  Left Ventricle Normal cavity size, wall thickness and systolic function (ejection fraction normal). The estimated EF is 50 - 55%. Left Atrium Mildly dilated left atrium. Left atrial appendage is normal. Left atrial appendage size is moderate. There is no thrombus within the left atrial appendage. There is no mass within the left atrial appendage. No spontaneous echo contrast Appendage velocity is normal (greater than 40 cm/sec). Right Ventricle Normal cavity size and global systolic function. Right Atrium Normal cavity size. Interatrial Septum Agitated saline contrast study was performed. No atrial septal defect present. No closure device present. Aortic Valve Normal valve structure and no stenosis. Trace aortic valve regurgitation.   
Mitral Valve No stenosis. Mitral valve thickening. Leaflet calcification. There is anterior leaflet calcification. There is posterior leaflet calcification. Moderate regurgitation. Tricuspid Valve Normal valve structure and no stenosis. Moderate regurgitation. Pulmonic Valve Pulmonic valve not well visualized. Aorta Normal aortic root, ascending aortic, and aortic arch. Pulmonary Artery Pulmonary arteries not assessed. IVC/Hepatic Veins Inferior vena cava not assessed. Pericardium Insignificant pericardial effusion or fat.  
  
 
 
Hospital Course: The patient underwent a CABGx1 , MVR(tissue), and ANA CRISTINA clip by Dr. Linwood Donnelly on 6/9/21 -see op note for details. She was transferred to the ICU on amiodarone, precedex, insulin, dobutamine, and epinephrine gtts. She developed worsening post op cardiogenic shock, was taken to the cath lab on POD1 and had PCI as well as impella inserted. She developed PEA arrest. Time of death 2309 on 6/10/21. See progress notes for details. Discharge Vital Signs:  
Visit Vitals BP (!) 74/49 Pulse (!) 127 Temp (!) 100.8 °F (38.2 °C) Resp (!) 36 Ht 5' 1\" (1.549 m) Wt 207 lb 3.7 oz (94 kg) SpO2 98% BMI 39.16 kg/m² Labs:  
Recent Labs  
  06/10/21 
2306 06/10/21 
2243 06/09/21 
1654 06/09/21 
1523 WBC 16.1* 18.0*   < > 20.9* HGB 3.7* 4.2*   < > 9.7* HCT 12.3* 13.9*   < > 29.1*  
* 153   < > 177 NA  --  143   < > 146* K  --  3.5   < > 3.7 BUN  --  12   < > 19 CREA  --  1.10*   < > 1.08* GLU  --  201*   < > 143* GLUCPOC  --  187*  --   --   
INR  --   --   --  1.3*  
 < > = values in this interval not displayed. Signed: Loly Guerrero NP 
6/11/2021 
9:55 AM

## 2021-06-11 NOTE — PROGRESS NOTES
At patient bedside s/p PCI and Impella CP placement. Multiple rounds of CPR for PEA performed. Bedside TTE showed cardiac standstill. Pt pronounced dead. Films and case reviewed with Dr. Luc Chapman. Discussed with , No Avila.

## 2021-06-11 NOTE — PROGRESS NOTES
Spiritual Care Assessment/Progress Note [facility] 
 
 
NAME: Moose Mack      MRN: [id number] AGE: 76 y.o. SEX: female Druze Affiliation: [employer]  
Language: [place] 6/10/2021     Total Time (in minutes): 10 Spiritual Assessment begun in [facility] 2 CRITICAL CARE 1 through conversation with: 
  
    [x]Patient        [] Family    [] Friend(s) Reason for Consult: Death, Inpatient Spiritual beliefs: (Please include comment if needed) 
   [] Identifies with a raj tradition:     
   [] Supported by a raj community:        
   [] Claims no spiritual orientation:       
   [] Seeking spiritual identity:            
   [] Adheres to an individual form of spirituality:       
   [x] Not able to assess:  Identified resources for coping:  
   [x] Prayer                           
   [] Music                  [] Guided Imagery 
   [] Family/friends                 [] Pet visits [] Devotional reading                         [] Unknown 
   [] Other:                                        
 
 
Interventions offered during this visit: (See comments for more details) Patient Interventions: Prayer (actual) Plan of Care: 
 
 [] Support spiritual and/or cultural needs  
 [] Support AMD and/or advance care planning process    
 [] Support grieving process 
 [] Coordinate Rites and/or Rituals  
 [] Coordination with community clergy [] No spiritual needs identified at this time 
 [] Detailed Plan of Care below (See Comments)  [] Make referral to Music Therapy 
[] Make referral to Pet Therapy    
[] Make referral to Addiction services 
[] Make referral to [facility] 
[] Make referral to Spiritual Care Partner 
[] No future visits requested       
[] Follow up upon further referrals Comments: Responded to page from CCU nursing staff for patient death. Consulted with RN, Joana Garcia, no family present or coming.  Offered a prayer with Joana Garcia at bedside. Rev. gaby [facility] Paging Service: [phone])

## 2021-06-11 NOTE — PROGRESS NOTES
1940 to cath lab per RN, RT and cath lab team 
 
22:26  Arrive back from cath lab with impella in place. VS labile and unstable. CI 1.4. Cardiac rhythm varies from Afib 140 to SB 40's with UMBERTO and frequent PVC's. Requiring frequent amps of Epi to maintain BP. Rapidly titrated all drips  To exceedingly high doses. Unable to maintain BP. Dr Joni Mclean notified and enroute. 2237 PEA . Code Blue called. 2309 Code terminated  PEA no art or PA waveform visible . Discussion with Dr Joni Mclean and reviewed all resuscitation attempts. Dr Sarah Núñez and NP at bedside and agree with  Code termination. 23:11  Dr Joni Mclean here. Notification of family by phone. Family unable to come to hospital.  
 
01:30  To jesús. No family present so belongings bag of clothes and shoes sent with patient. No evidence of glasses, dentures , cell phone or other valuables present.

## 2021-06-11 NOTE — PROCEDURES
She is s/p MV replacement and LIMA to LAD on 6/9 . Has decompensated post op over 24 hrs , with Cardiogenic Shock . She is now on multiple pressors, including EPI , FALLON , Vasopressin , Dobutamine . Intubated on ventilator. l 
EKG c/w acute Ischemic changes. Troponin to 15 . Plan is to cath and assess LIMA - LAD   ,  PCI if needed. Insert Impella for LV assist.  
 
Sheath place R Femoral with Smart Doppler Needle. Angiography done with of L cor and LIMA graft . There is severe stenosis 90% of the anastomotic site and diffuse severe disease of the distal LAD down and around the [place] . The LIMA graft is patent and does fill the distal LAD , with the severe stenosis 90 % the anastomosis . There is 70 % prox LAD lesion . Impella inserted via same access site. Sheath and guiding catheter then inserted through the Impella sheath , single stick access. EBU 3.5 guide. PCI done through the Native LAD with stenting of the proximal lesion and the distal LAD starting above the anastomosis and extending down to the [place] . Imer MARY CARMEN at both sites. 2.5 x 12 proximal .   2.25 stents distal .     There is 0% residual in the stented segments. There remains diffuse distal disease Infra apical , too small to stent . There is competitive flow from the LIMA    
 
LV EF 20 % . LVEDP ~ 15 mmHg . She required increasing doses of pressors throughout the case . Remains in Cardiogenic Shock . Acidotic .    1 amp HCO3  Given , along with amps of EPI and boluses of FALLON,  Along with Albumin and fluid volume . Will give Brilinta through NG tube.

## 2021-06-12 LAB
ABO + RH BLD: NORMAL
BLD PROD TYP BPU: NORMAL
BLOOD GROUP ANTIBODIES SERPL: NORMAL
BPU ID: NORMAL
CROSSMATCH RESULT,%XM: NORMAL
SPECIMEN EXP DATE BLD: NORMAL
STATUS OF UNIT,%ST: NORMAL
UNIT DIVISION, %UDIV: 0

## 2021-06-14 NOTE — PROGRESS NOTES
Quality: Chart reviewed for death and restraints. Bilateral soft wrist restraints noted during hospitalization. Restraints not a contributing factor in patient death. Documented for tracking according to CMS guidelines at 1117 am on 6/14/21.

## 2021-07-07 NOTE — PROCEDURES
[address]  PULMONARY FUNCTION TEST    Name:  Mt Nagel  MR#:  [id number]  :  1955  ACCOUNT #:  [de-identified]  DATE OF SERVICE:  2021      REASON FOR THE TEST:  Shortness of breath. Spirometry and lung volumes were performed and they reveal:  1. No airflow obstruction. 2.  No restrictive lung disease. 3.  Severe reduction in DLCO. 4.  Normal flow-volume loop.       MD WEST Montiel/[id number]/  D:  2021 8:55  T:  2021 11:25  JOB #:  [id number]  CC:  Juni Brush DO

## (undated) DEVICE — SOL INJ PLSM-LYTE5% 7.4 NRMSOL --

## (undated) DEVICE — SOL INJ SOD CL 0.9% 500ML BG --

## (undated) DEVICE — SUTURE NONABSORBABLE BRAIDED 2-0 SH 10X30 IN ETHBND EXCEL 10X82

## (undated) DEVICE — KIT,1200CC CANISTER,3/16"X6' TUBING: Brand: MEDLINE INDUSTRIES, INC.

## (undated) DEVICE — SUTURE PROL SZ 4-0 L36IN NONABSORBABLE BLU L26MM SH 1/2 CIR 8521H

## (undated) DEVICE — SUT PROL 3-0 36IN SH DA BLU --

## (undated) DEVICE — PACK PROCEDURE SURG HRT CATH

## (undated) DEVICE — STERILE POLYISOPRENE POWDER-FREE SURGICAL GLOVES: Brand: PROTEXIS

## (undated) DEVICE — CATHETER ANGIO AD 6FR L100CM COR W/O HYDRPHLC RADIOGRAPHIC

## (undated) DEVICE — REM POLYHESIVE ADULT PATIENT RETURN ELECTRODE: Brand: VALLEYLAB

## (undated) DEVICE — PINNACLE INTRODUCER SHEATH: Brand: PINNACLE

## (undated) DEVICE — TREK CORONARY DILATATION CATHETER 2.50 MM X 15 MM / RAPID-EXCHANGE: Brand: TREK

## (undated) DEVICE — SUMP PERICARD AD 20FR WT TIP VERSATILE DSGN MULT PRT VENT

## (undated) DEVICE — MEDI-TRACE CADENCE ADULT, DEFIBRILLATION ELECTRODE -RTS  (10 PR/PK) - PHILIPS: Brand: MEDI-TRACE CADENCE

## (undated) DEVICE — GOWN,SIRUS,NONRNF,SETINSLV,XL,20/CS: Brand: MEDLINE

## (undated) DEVICE — AORTIC PUNCHES ARE USED TO CREATE A UNIFORM OPENING IN BLOOD VESSELS DURING CARDIOVASCULAR SURGERY. THE VESSEL GRAFT IS INSERTED INTO THE CREATED OPENING AND SUTURED TO THE VESSEL WALL. AORTIC LANCETS ARE USED TO MAKE A SMALL UNIFORM CUT IN A BLOOD VESSEL TO FACILITATE INSERTION OF AN AORTIC PUNCH.  PUNCHES COME IN VARIOUS LENGTHS, DIAMETERS AND TIP CONFIGURATIONS.: Brand: CLEANCUT ROTATING AORTIC PUNCH

## (undated) DEVICE — SPONGE GZ W4XL4IN COT 12 PLY TYP VII WVN C FLD DSGN

## (undated) DEVICE — VENT CATHETER: Brand: EDWARDS LIFESCIENCES VENT CATHETER

## (undated) DEVICE — PINNACLE R/O II INTRODUCER SHEATH WITH RADIOPAQUE MARKER: Brand: PINNACLE

## (undated) DEVICE — CLAMP ABLAT JAW L65MM L CRV 2 ELECTRD BPLR

## (undated) DEVICE — CANNULA PERF 28FR L30.5CM CONN SITE 3/8IN VEN R ANG SGL STG

## (undated) DEVICE — GUIDEWIRE VASC L145CM 0.035IN J TIP L3MM PTFE FIX COR NAMIC

## (undated) DEVICE — DRESSING SIL W4XL5IN ANTIBACT GELLING FBR CYTOFORM

## (undated) DEVICE — SINGLE STAGE VENOUS RETURN CANNULA: Brand: THIN-FLEX SINGLE STAGE VENOUS DRAINAGE CANNULA

## (undated) DEVICE — SUTURE SZ 7 L18IN NONABSORBABLE SIL CCS L48MM 1/2 CIR STRNM M655G

## (undated) DEVICE — SWAN-GANZ HI-SHORE TRUE SIZE THERMODILUTION CATHETER: Brand: SWAN-GANZ HI-SHORE TRUE SIZE

## (undated) DEVICE — PUMP HEART IMPELLA CP03 --

## (undated) DEVICE — DRAPE PRB US TRNSDCR 6X96IN --

## (undated) DEVICE — 3M™ MEDIPORE™ H SOFT CLOTH SURGICAL TAPE 2864, 4 INCH X 10 YARD (10CM X 9,14M), 12 ROLLS/CASE: Brand: 3M™ MEDIPORE™

## (undated) DEVICE — BAG RED 3PLY 2MIL 30X40 IN

## (undated) DEVICE — MINI TREK CORONARY DILATATION CATHETER 2.0 MM X 12 MM / RAPID-EXCHANGE: Brand: MINI TREK

## (undated) DEVICE — Z DISCONTINUED USE 2739180 SUTURE ETHIB EXCL BR GRN WHT V-7 2-0 30 PXX77

## (undated) DEVICE — SYR 50ML LR LCK 1ML GRAD NSAF --

## (undated) DEVICE — CANNULA PERF ART HI FLO VENT DIL TIP J TIP 3/8IN CONN 22FR

## (undated) DEVICE — CATHETER ETER CARD MULTIPAK MULTIPAK 5FR PERFORMA

## (undated) DEVICE — PRESSURE GUIDEWIRE: Brand: COMET™ II

## (undated) DEVICE — 6 FOOT DISPOSABLE EXTENSION CABLE WITH SAFE CONNECT / SCREW-DOWN

## (undated) DEVICE — TRANSFER PK BLD PROD 300ML --

## (undated) DEVICE — CATH GUID COR EB35 6FR 100CM -- LAUNCHER

## (undated) DEVICE — CANNULA PERF L2IN BLNT TIP 2MM VES CLR RADPQ BODY FEM LUER

## (undated) DEVICE — SYR LR LCK 1ML GRAD NSAF 30ML --

## (undated) DEVICE — SUTURE PROL SZ 6-0 L30IN NONABSORBABLE BLU L13MM C-1 3/8 M8706

## (undated) DEVICE — SUTURE VCRL SZ 0 L18IN ABSRB VLT L36MM CT-1 1/2 CIR J740D

## (undated) DEVICE — DRAPE SLUSH DISC W44XL66IN ST FOR RND BSIN HUSH SLUSH SYS

## (undated) DEVICE — PRESSURE MONITORING SET: Brand: TRUWAVE

## (undated) DEVICE — DUAL LUMEN STOMACH TUBE MULTI-FUNCTIONAL PORT: Brand: SALEM SUMP

## (undated) DEVICE — KIT ACCS INTRO 4FR L10CM NDL 21GA L7CM GWIRE L40CM

## (undated) DEVICE — DRAPE FLD WRM W44XL66IN C6L FOR INTRATEMP SYS THERMABASIN

## (undated) DEVICE — BANDAGE COMPR W6INXL10YD ST M E WHITE/BEIGE

## (undated) DEVICE — BLADE SAW STRNL 29.9MM NS --

## (undated) DEVICE — 72" ARTERIAL PRESSURE TUBING: Brand: ICU MEDICAL

## (undated) DEVICE — PLEDGET SURG W3/16XL0.25IN THK1.65MM PTFE OVL FELT FOR THE

## (undated) DEVICE — SUT PROL 6-0 30IN C1 DA BLU --

## (undated) DEVICE — Z DUP USE 2367328 BLADE SAW 32X6.35 MM STRNM FOR CUT STERNOTOMY

## (undated) DEVICE — Z INACTIVE USE 2384703 CLIP INT SM WIDE RED TI TRNSVRS GRV CHEVRON SHP W PRECIS

## (undated) DEVICE — SUT PROL 5-0 36IN RB1 DA BLU --

## (undated) DEVICE — CENTRAL VENOUS CATHETER SET: Brand: COOK

## (undated) DEVICE — STRAP,POSITIONING,KNEE/BODY,FOAM,4X60": Brand: MEDLINE

## (undated) DEVICE — SET PERF L203CM 12IN RED AND BLU AORT ROOT MULT SLIP CONN

## (undated) DEVICE — KIT ANGIOGRAPHY CUST MRMC

## (undated) DEVICE — KIT BLWR MISTER 5P 15L W/ TBNG SET IRRIG MIST TO IMPROVE

## (undated) DEVICE — SUTURE MCRYL SZ 3-0 L27IN ABSRB UD L19MM PS-2 3/8 CIR PRIM Y427H

## (undated) DEVICE — TOURNIQUET 12FR L7IN 3 CLR 1 SNR VENA CAVA DLP

## (undated) DEVICE — PLEDGET SUT SFT OVL 3 8X5 16IN

## (undated) DEVICE — SOL IRR SOD CL 0.9% 1000ML BTL --

## (undated) DEVICE — PREP SKN CHLRAPRP APL 26ML STR --

## (undated) DEVICE — SUTURE ETHBND EXCEL SZ 3-0 L36IN NONABSORBABLE GRN BB L17MM X588H

## (undated) DEVICE — Device

## (undated) DEVICE — COVER,TABLE,HEAVY DUTY,77"X90",STRL: Brand: MEDLINE

## (undated) DEVICE — NEEDLE HYPO 18GA L1.5IN PNK S STL HUB POLYPR SHLD REG BVL

## (undated) DEVICE — TUBING, SUCTION, 1/4" X 10', STRAIGHT: Brand: MEDLINE

## (undated) DEVICE — CATH GUID .056IN 6FR 150CM -- GUIDELINER V3

## (undated) DEVICE — CUSTOM KT PTCA INFL DEV K05 00053H

## (undated) DEVICE — INSERT SUT HLD F/OCTBS RETRCTR --

## (undated) DEVICE — NDL PERC VASC ACC 18GX2.75I --

## (undated) DEVICE — DRESSING AQUACEL ADVANTAGE ALG W4XL5IN RECT GREATER ABSRB HYDRFBR TECHNOLOGY

## (undated) DEVICE — 3M™ TEGADERM™ TRANSPARENT FILM DRESSING FRAME STYLE, 1626W, 4 IN X 4-3/4 IN (10 CM X 12 CM), 50/CT 4CT/CASE: Brand: 3M™ TEGADERM™

## (undated) DEVICE — SUT SLK 2 60IN TIE MP BLK --

## (undated) DEVICE — SYR 10ML LUER LOK 1/5ML GRAD --

## (undated) DEVICE — CATHETER INFUS L150CM OD0.84X0.61MM ID0.53X0.46MM DSTL L40CM

## (undated) DEVICE — AGENT HEMSTAT W4XL4IN OXIDIZED REGENERATED CELOS ABSRB SFT

## (undated) DEVICE — CLIP INT L ORNG TI TRNSVRS GRV CHEVRON SHP W/ PRECIS TIP TO

## (undated) DEVICE — CLIP LIG M BLU TI HRT SHP WIRE HORZ 600 PER BX

## (undated) DEVICE — LABEL MED CARD MRMC STRL

## (undated) DEVICE — SYR POWER 150ML 8IN FILL TUBE --

## (undated) DEVICE — KIT,ANTI FOG,W/SPONGE & FLUID,SOFT PACK: Brand: MEDLINE

## (undated) DEVICE — DRAIN,WOUND,ROUND,24FR,5/16",FULL-FLUTED: Brand: MEDLINE

## (undated) DEVICE — TTL1LYR 16FR10ML 100%SIL TMPST TR: Brand: MEDLINE

## (undated) DEVICE — 3M™ IOBAN™ 2 ANTIMICROBIAL INCISE DRAPE 6648EZ: Brand: IOBAN™ 2

## (undated) DEVICE — DRSG BORDR MPLX HEEL 8.7X9.1IN --

## (undated) DEVICE — INFECTION CONTROL KIT SYS

## (undated) DEVICE — SOL IRR STRL H2O 1000ML BTL --

## (undated) DEVICE — BLANKET WRM W25XL64IN NONWOVEN SFT LTWT PLIABLE HYPR

## (undated) DEVICE — Device: Brand: PROWATER

## (undated) DEVICE — SUTURE TICRON DBL ARMED 2 0 CV 305 42IN BLU N ABSRB BRAID 8886303551

## (undated) DEVICE — SUT PROL 4-0 36IN RB1 DA BLU --

## (undated) DEVICE — SUTURE PROL SZ 7-0 L24IN NONABSORBABLE BLU L8MM BV175-6 3/8 8735H

## (undated) DEVICE — ADULT SPO2 SENSOR: Brand: NELLCOR

## (undated) DEVICE — SYR 20ML LL STRL LF --

## (undated) DEVICE — SUTURE PERMAHAND SZ 2-0 L18IN NONABSORBABLE BLK L26MM PS 1588H

## (undated) DEVICE — SUTURE PERMA-HAND 0 L18IN NONABSORBABLE BLK CT-1 L36MM 1/2 C021D

## (undated) DEVICE — COR-KNOT MINI® COMBO KITBASE PACKAGE TYPE - KITEACH STERILE PACKAGE KIT CONTAINS (2) SINGLE PATIENT USE COR-KNOT MINI® DEVICES AND (12) COR-KNOT® QUICK LOADS®.: Brand: COR-KNOT MINI®